# Patient Record
Sex: MALE | Race: WHITE | NOT HISPANIC OR LATINO | Employment: OTHER | URBAN - METROPOLITAN AREA
[De-identification: names, ages, dates, MRNs, and addresses within clinical notes are randomized per-mention and may not be internally consistent; named-entity substitution may affect disease eponyms.]

---

## 2017-01-24 ENCOUNTER — OFFICE VISIT (OUTPATIENT)
Dept: CARDIOLOGY | Facility: CLINIC | Age: 72
End: 2017-01-24

## 2017-01-24 VITALS
HEART RATE: 54 BPM | SYSTOLIC BLOOD PRESSURE: 144 MMHG | DIASTOLIC BLOOD PRESSURE: 78 MMHG | WEIGHT: 177 LBS | BODY MASS INDEX: 26.83 KG/M2 | HEIGHT: 68 IN

## 2017-01-24 DIAGNOSIS — E78.2 MIXED HYPERLIPIDEMIA: ICD-10-CM

## 2017-01-24 DIAGNOSIS — I48.0 PAROXYSMAL ATRIAL FIBRILLATION (HCC): Primary | ICD-10-CM

## 2017-01-24 DIAGNOSIS — I15.9 SECONDARY HYPERTENSION: ICD-10-CM

## 2017-01-24 PROBLEM — R07.9 CHEST PAIN: Status: ACTIVE | Noted: 2017-01-24

## 2017-01-24 PROCEDURE — 93000 ELECTROCARDIOGRAM COMPLETE: CPT | Performed by: NURSE PRACTITIONER

## 2017-01-24 PROCEDURE — 99213 OFFICE O/P EST LOW 20 MIN: CPT | Performed by: NURSE PRACTITIONER

## 2017-01-24 RX ORDER — ATORVASTATIN CALCIUM 10 MG/1
20 TABLET, FILM COATED ORAL DAILY
COMMUNITY
End: 2017-01-24 | Stop reason: SDUPTHER

## 2017-01-24 RX ORDER — ATORVASTATIN CALCIUM 10 MG/1
20 TABLET, FILM COATED ORAL DAILY
Qty: 180 TABLET | Refills: 3 | Status: SHIPPED | OUTPATIENT
Start: 2017-01-24 | End: 2018-01-24

## 2017-01-24 RX ORDER — FLECAINIDE ACETATE 150 MG/1
75 TABLET ORAL 2 TIMES DAILY
Qty: 90 TABLET | Refills: 3 | Status: SHIPPED | OUTPATIENT
Start: 2017-01-24 | End: 2018-01-24

## 2017-01-24 RX ORDER — FLECAINIDE ACETATE 150 MG/1
75 TABLET ORAL 2 TIMES DAILY
COMMUNITY
End: 2017-01-24 | Stop reason: SDUPTHER

## 2017-01-24 NOTE — PROGRESS NOTES
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    CC/Reason for visit:  Follow-up; Paroxysmal Atrial Fibrillation; and Hypertension          Jovon Gracia returns today for 12 month follow-up of his paroxysmal atrial fibrillation, hypertension and hyperlipidemia.  Since his last visit with us he has done well.  He has had no episodes of atrial fibrillation that he is aware of.  He denies any signs and symptoms of a TIA or stroke, specifically he denies slurred speech, one-sided weakness, blurred vision or facial drooping.  His only issue is he has been having difficulty with both of his wrists and was diagnosed with carpal tunnel.  He has an appointment with Dr Cate Slater for possible wrist surgery. He also saw a surgeon for his umbilical hernia but is going to hold off on surgery for it as it has been present and uncharged for over 40 years.  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  He denies any episodes of chest pain/heaviness/pressure, dyspnea, orthopnea, palpitations or syncope.  He underwent a stress test 2 years ago that was normal showing no evidence of ischemia.  His blood pressure is slightly elevated at today's visit and when looking back at his last visit it was slightly elevated then as well with systolics ranging in the 140s to 150s.  He admits he does not routinely check his blood pressure at home.  He routinely follows up with his primary care provider for routine bloodwork and was recently started on low-dose Lipitor of which he appears to be tolerating well and he denies any side effects such as myalgias.    Review of Systems   Constitution: Negative for weakness and malaise/fatigue.   Eyes: Negative for vision loss in left eye and vision loss in right eye.   Cardiovascular:  "Negative for chest pain, dyspnea on exertion, near-syncope, orthopnea, palpitations, paroxysmal nocturnal dyspnea and syncope.   Respiratory: Positive for snoring.    Musculoskeletal: Negative for myalgias.   Gastrointestinal: Positive for bloating.   Neurological: Negative for brief paralysis, excessive daytime sleepiness, focal weakness, numbness and paresthesias.   All other systems reviewed and are negative.      The patient's past medical, social, and family history reviewed in the patient's electronic medical record.    Allergies  Other    Outpatient Prescriptions Marked as Taking for the 1/24/17 encounter (Office Visit) with MIRIAM Zamora   Medication Sig Dispense Refill   • apixaban (ELIQUIS) 5 MG tablet tablet Take 1 tablet by mouth 2 (Two) Times a Day. 180 tablet 3   • atorvastatin (LIPITOR) 10 MG tablet Take 2 tablets by mouth Daily. 180 tablet 3   • flecainide (TAMBOCOR) 150 MG tablet Take 0.5 tablets by mouth 2 (Two) Times a Day. 90 tablet 3   • [DISCONTINUED] apixaban (ELIQUIS) 5 MG tablet tablet Take 5 mg by mouth 2 (Two) Times a Day.     • [DISCONTINUED] atorvastatin (LIPITOR) 10 MG tablet Take 20 mg by mouth Daily.     • [DISCONTINUED] flecainide (TAMBOCOR) 150 MG tablet Take 75 mg by mouth 2 (Two) Times a Day.           Blood pressure 144/78, pulse 54, height 68\" (172.7 cm), weight 177 lb (80.3 kg).  Body mass index is 26.91 kg/(m^2).    Physical Exam   Constitutional: He is oriented to person, place, and time. He appears well-developed and well-nourished.   HENT:   Head: Normocephalic and atraumatic.   Eyes: Pupils are equal, round, and reactive to light. No scleral icterus.   Neck: No JVD present. Carotid bruit is not present. No thyromegaly present.   Cardiovascular: Regular rhythm, S1 normal and S2 normal.  Bradycardia present.  Exam reveals no gallop.    No murmur heard.  Pulmonary/Chest: Effort normal and breath sounds normal.   Abdominal: Soft. There is no tenderness. "   Neurological: He is alert and oriented to person, place, and time.   Skin: Skin is warm and dry. No cyanosis. Nails show no clubbing.   Psychiatric: He has a normal mood and affect. His behavior is normal.       Data Review:     ECG 12 Lead  Date/Time: 1/24/2017 10:29 AM  Performed by: ANJALI MENDOZA  Authorized by: ANJALI MENDOZA   Rhythm: sinus bradycardia and A-V block  BPM: 54  Conduction: right bundle branch block and LAFB  Clinical impression: abnormal ECG  Comments: Sinus bradycardia with first-degree AV block  Incomplete right bundle branch block  Left anterior fascicular block  QRS 98 MS  QT/QTc 448/424 MS time    MS                 Problem List Items Addressed This Visit        Cardiovascular and Mediastinum    Paroxysmal atrial fibrillation - Primary    Overview     · CHADS-VASc score=2, on Eliquis and Flecanide  · Echocardiogram 10/21/2014: LVEF 55% to 60%, no valvular or wall motion abnormalities.  · Cardiolite stress test 11/6/2014: normal LV function and no evidence of ischemia with mild diaphragmatic attenuation.  · Event monitoring showing atrial fibrillation, symptomatic with palpitations and chest pain.            Current Assessment & Plan     · Continue Eliquis 5 mg twice a day  · Continue flecainide 150 mg twice a day         Relevant Orders    ECG 12 Lead    Secondary hypertension    Current Assessment & Plan     · Keep blood pressure log for next several weeks and contact us if SBP consistently >140 mmHg.  · Would recommend starting low-dose lisinopril 10 mg daily for SBP consistently >140 mmHg         Mixed hyperlipidemia    Current Assessment & Plan     · Continue Lipitor to 20 mg daily  · Follow-up primary care provider for routine lipid monitoring         Relevant Medications    atorvastatin (LIPITOR) 10 MG tablet        Mr. Gracia is doing well from a cardiovascular standpoint.  I am concerned his blood pressures may be running high.  We will hold off starting any additional  medications at this time and have him monitor his blood pressure over the next several weeks.  I instructed him to contact us if his SBP is consistently greater than 140 as we will need to add an additional antihypertensive medication to his regimen.  I would recommend adding low-dose lisinopril 10 mg daily.  If we do at this medication we will need to check a BMP in 2 weeks.       · Follow-up 12 months or sooner if needed.    Mariana PINEDA evaluated treated this patient independently    MIRIAM Taveras  1/24/2017

## 2017-01-24 NOTE — ASSESSMENT & PLAN NOTE
· Keep blood pressure log for next several weeks and contact us if SBP consistently >140 mmHg.  · Would recommend starting low-dose lisinopril 10 mg daily for SBP consistently >140 mmHg

## 2017-01-24 NOTE — MR AVS SNAPSHOT
Jovon Gracia   1/24/2017 10:30 AM   Office Visit    Dept Phone:  [phone]   Encounter #:  [id number]    Provider:  MIRIAM Zamora   Department:  [facility] [place] [facility]                Your Full Care Plan              Where to Get Your Medications      These medications were sent to [facility] [address] [place] [address] - [address] - [phone]  - [phone] FX  [address], [place] [id number]     Phone:  [phone]     apixaban 5 MG tablet tablet    atorvastatin 10 MG tablet    flecainide 150 MG tablet            Your Updated Medication List          This list is accurate as of: 1/24/17 10:53 AM.  Always use your most recent med list.                apixaban 5 MG tablet tablet   Commonly known as:  ELIQUIS   Take 1 tablet by mouth 2 (Two) Times a Day.       atorvastatin 10 MG tablet   Commonly known as:  LIPITOR   Take 2 tablets by mouth Daily.       flecainide 150 MG tablet   Commonly known as:  TAMBOCOR   Take 0.5 tablets by mouth 2 (Two) Times a Day.               We Performed the Following     ECG 12 Lead       You Were Diagnosed With        Codes Comments    Paroxysmal atrial fibrillation    -  Primary ICD-10-CM: I48.0  ICD-9-CM: 427.31     Secondary hypertension     ICD-10-CM: I15.9  ICD-9-CM: 405.99     Mixed hyperlipidemia     ICD-10-CM: E78.2  ICD-9-CM: 272.2       Instructions     None    Patient Instructions History      Upcoming Appointments     Visit Type Date Time Department    FOLLOW UP 1/24/2017 10:30 AM [facility]      Visiprise Signup     [facility] Visiprise allows you to send messages to your doctor, view your test results, renew your prescriptions, schedule appointments, and more. To sign up, go to [employer] and click on the Sign Up Now link in the New User? box. Enter your Visiprise Activation Code exactly as it appears below along with the last four digits of your Social Security  "Number and your Date of Birth () to complete the sign-up process. If you do not sign up before the expiration date, you must request a new code.    [employer] Activation Code: [email or web address]  Expires: 2017 10:51 AM    If you have questions, you can email [email or web address] or call [phone] to talk to our [employer] staff. Remember, [employer] is NOT to be used for urgent needs. For medical emergencies, dial 911.               Other Info from Your Visit           Your Appointments     2018 11:30 AM EST   Follow Up with Quinten Dial MD   [facility] (--)    [address] [place] [address] [address] [place] [id number] [phone]           Arrive 15 minutes prior to appointment.              Allergies     Other       BETA-BLOCKERS, (bradycardia).       Reason for Visit     Follow-up     Paroxysmal Atrial Fibrillation     Hypertension           Vital Signs     Blood Pressure Pulse Height Weight Body Mass Index Smoking Status    144/78 (BP Location: Right arm, Patient Position: Sitting) 54 68\" (172.7 cm) 177 lb (80.3 kg) 26.91 kg/m2 Former Smoker      Problems and Diagnoses Noted     Mixed hyperlipidemia    Atrial fibrillation (irregular heartbeat)    Secondary hypertension        "

## 2017-01-24 NOTE — LETTER
January 24, 2017     Cheri Monaco MD  2017 [address] [address]    Patient: Jovon Gracia   YOB: 1945   Date of Visit: 1/24/2017       Dear Dr. Jacinda MD:    Thank you for referring Jovon Gracia to me for evaluation. Below are the relevant portions of my assessment and plan of care.    If you have questions, please do not hesitate to call me. I look forward to following Jovon along with you.         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    CC/Reason for visit:  Follow-up; Paroxysmal Atrial Fibrillation; and Hypertension          Jovon Gracia returns today for 12 month follow-up of his paroxysmal atrial fibrillation, hypertension and hyperlipidemia.  Since his last visit with us he has done well.  He has had no episodes of atrial fibrillation that he is aware of.  He denies any signs and symptoms of a TIA or stroke, specifically he denies slurred speech, one-sided weakness, blurred vision or facial drooping.  His only issue is he has been having difficulty with both of his wrists and was diagnosed with carpal tunnel.  He has an appointment with Dr Cate Slater for possible wrist surgery. He also saw a surgeon for his umbilical hernia but is going to hold off on surgery for it as it has been present and uncharged for over 40 years.  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  He denies any episodes of chest pain/heaviness/pressure, dyspnea, orthopnea, palpitations or syncope.  He underwent a stress test 2 years ago that was normal showing no evidence of ischemia.  His blood pressure is slightly elevated at today's visit and when looking back at  "his last visit it was slightly elevated then as well with systolics ranging in the 140s to 150s.  He admits he does not routinely check his blood pressure at home.  He routinely follows up with his primary care provider for routine bloodwork and was recently started on low-dose Lipitor of which he appears to be tolerating well and he denies any side effects such as myalgias.    Review of Systems   Constitution: Negative for weakness and malaise/fatigue.   Eyes: Negative for vision loss in left eye and vision loss in right eye.   Cardiovascular: Negative for chest pain, dyspnea on exertion, near-syncope, orthopnea, palpitations, paroxysmal nocturnal dyspnea and syncope.   Respiratory: Positive for snoring.    Musculoskeletal: Negative for myalgias.   Gastrointestinal: Positive for bloating.   Neurological: Negative for brief paralysis, excessive daytime sleepiness, focal weakness, numbness and paresthesias.   All other systems reviewed and are negative.      The patient's past medical, social, and family history reviewed in the patient's electronic medical record.    Allergies  Other    Outpatient Prescriptions Marked as Taking for the 1/24/17 encounter (Office Visit) with MIRIAM Zamora   Medication Sig Dispense Refill   • apixaban (ELIQUIS) 5 MG tablet tablet Take 1 tablet by mouth 2 (Two) Times a Day. 180 tablet 3   • atorvastatin (LIPITOR) 10 MG tablet Take 2 tablets by mouth Daily. 180 tablet 3   • flecainide (TAMBOCOR) 150 MG tablet Take 0.5 tablets by mouth 2 (Two) Times a Day. 90 tablet 3   • [DISCONTINUED] apixaban (ELIQUIS) 5 MG tablet tablet Take 5 mg by mouth 2 (Two) Times a Day.     • [DISCONTINUED] atorvastatin (LIPITOR) 10 MG tablet Take 20 mg by mouth Daily.     • [DISCONTINUED] flecainide (TAMBOCOR) 150 MG tablet Take 75 mg by mouth 2 (Two) Times a Day.           Blood pressure 144/78, pulse 54, height 68\" (172.7 cm), weight 177 lb (80.3 kg).  Body mass index is 26.91 kg/(m^2).    Physical " Exam   Constitutional: He is oriented to person, place, and time. He appears well-developed and well-nourished.   HENT:   Head: Normocephalic and atraumatic.   Eyes: Pupils are equal, round, and reactive to light. No scleral icterus.   Neck: No JVD present. Carotid bruit is not present. No thyromegaly present.   Cardiovascular: Regular rhythm, S1 normal and S2 normal.  Bradycardia present.  Exam reveals no gallop.    No murmur heard.  Pulmonary/Chest: Effort normal and breath sounds normal.   Abdominal: Soft. There is no tenderness.   Neurological: He is alert and oriented to person, place, and time.   Skin: Skin is warm and dry. No cyanosis. Nails show no clubbing.   Psychiatric: He has a normal mood and affect. His behavior is normal.       Data Review:     ECG 12 Lead  Date/Time: 1/24/2017 10:29 AM  Performed by: ANJALI MENDOZA  Authorized by: ANJALI MENDOZA   Rhythm: sinus bradycardia and A-V block  BPM: 54  Conduction: right bundle branch block and LAFB  Clinical impression: abnormal ECG  Comments: Sinus bradycardia with first-degree AV block  Incomplete right bundle branch block  Left anterior fascicular block  QRS 98 MS  QT/QTc 448/424 MS time    MS                 Problem List Items Addressed This Visit        Cardiovascular and Mediastinum    Paroxysmal atrial fibrillation - Primary    Overview     · CHADS-VASc score=2, on Eliquis and Flecanide  · Echocardiogram 10/21/2014: LVEF 55% to 60%, no valvular or wall motion abnormalities.  · Cardiolite stress test 11/6/2014: normal LV function and no evidence of ischemia with mild diaphragmatic attenuation.  · Event monitoring showing atrial fibrillation, symptomatic with palpitations and chest pain.            Current Assessment & Plan     · Continue Eliquis 5 mg twice a day  · Continue flecainide 150 mg twice a day         Relevant Orders    ECG 12 Lead    Secondary hypertension    Current Assessment & Plan     · Keep blood pressure log for next  several weeks and contact us if SBP consistently >140 mmHg.  · Would recommend starting low-dose lisinopril 10 mg daily for SBP consistently >140 mmHg         Mixed hyperlipidemia    Current Assessment & Plan     · Continue Lipitor to 20 mg daily  · Follow-up primary care provider for routine lipid monitoring         Relevant Medications    atorvastatin (LIPITOR) 10 MG tablet        Mr. Gracia is doing well from a cardiovascular standpoint.  I am concerned his blood pressures may be running high.  We will hold off starting any additional medications at this time and have him monitor his blood pressure over the next several weeks.  I instructed him to contact us if his SBP is consistently greater than 140 as we will need to add an additional antihypertensive medication to his regimen.  I would recommend adding low-dose lisinopril 10 mg daily.  If we do at this medication we will need to check a BMP in 2 weeks.       · Follow-up 12 months or sooner if needed.    Mariana PINEDA evaluated treated this patient independently    MIRIAM Taveras  1/24/2017

## 2017-02-01 ENCOUNTER — OTHER (OUTPATIENT)
Age: 72
End: 2017-02-01

## 2017-08-01 ENCOUNTER — TELEPHONE (OUTPATIENT)
Dept: CARDIOLOGY | Facility: CLINIC | Age: 72
End: 2017-08-01

## 2017-08-01 DIAGNOSIS — I15.8 OTHER SECONDARY HYPERTENSION: Primary | ICD-10-CM

## 2017-08-01 RX ORDER — LISINOPRIL 10 MG/1
10 TABLET ORAL DAILY
Qty: 90 TABLET | Refills: 0 | Status: SHIPPED | OUTPATIENT
Start: 2017-08-01 | End: 2017-11-01 | Stop reason: SDUPTHER

## 2017-08-01 NOTE — TELEPHONE ENCOUNTER
"Patient walked in today to report his BP running, \"high\" 's. He is unsure exactly of the readings. Per MIRIAM Briseno last note, will start lisinopril and get BMP in 2 weeks. Patient verbalized understanding.   "

## 2017-08-15 ENCOUNTER — LAB (OUTPATIENT)
Dept: LAB | Facility: HOSPITAL | Age: 72
End: 2017-08-15

## 2017-08-15 DIAGNOSIS — I15.8 OTHER SECONDARY HYPERTENSION: ICD-10-CM

## 2017-08-16 LAB
BUN SERPL-MCNC: 19 MG/DL (ref 9–23)
BUN/CREAT SERPL: 21.1 (ref 7–25)
CALCIUM SERPL-MCNC: 10.1 MG/DL (ref 8.7–10.4)
CHLORIDE SERPL-SCNC: 107 MMOL/L (ref 99–109)
CO2 SERPL-SCNC: 29 MMOL/L (ref 20–31)
CREAT SERPL-MCNC: 0.9 MG/DL (ref 0.6–1.3)
GLUCOSE SERPL-MCNC: 92 MG/DL (ref 70–100)
POTASSIUM SERPL-SCNC: 4.6 MMOL/L (ref 3.5–5.5)
SODIUM SERPL-SCNC: 144 MMOL/L (ref 132–146)

## 2017-11-01 RX ORDER — LISINOPRIL 10 MG/1
10 TABLET ORAL DAILY
Qty: 90 TABLET | Refills: 1 | Status: SHIPPED | OUTPATIENT
Start: 2017-11-01 | End: 2018-04-23 | Stop reason: SDUPTHER

## 2017-11-12 ENCOUNTER — APPOINTMENT (OUTPATIENT)
Dept: MRI IMAGING | Facility: IMAGING CENTER | Age: 72
End: 2017-11-12
Payer: MEDICARE

## 2017-11-12 ENCOUNTER — OUTPATIENT (OUTPATIENT)
Dept: OUTPATIENT SERVICES | Facility: HOSPITAL | Age: 72
LOS: 1 days | End: 2017-11-12
Payer: MEDICARE

## 2017-11-12 DIAGNOSIS — Z00.8 ENCOUNTER FOR OTHER GENERAL EXAMINATION: ICD-10-CM

## 2017-11-12 PROCEDURE — 82565 ASSAY OF CREATININE: CPT

## 2017-11-12 PROCEDURE — 74183 MRI ABD W/O CNTR FLWD CNTR: CPT | Mod: 26

## 2017-11-12 PROCEDURE — A9585: CPT

## 2017-11-12 PROCEDURE — 74183 MRI ABD W/O CNTR FLWD CNTR: CPT

## 2018-01-30 ENCOUNTER — OFFICE VISIT (OUTPATIENT)
Dept: CARDIOLOGY | Facility: CLINIC | Age: 73
End: 2018-01-30

## 2018-01-30 VITALS
HEIGHT: 68 IN | HEART RATE: 53 BPM | OXYGEN SATURATION: 98 % | DIASTOLIC BLOOD PRESSURE: 80 MMHG | SYSTOLIC BLOOD PRESSURE: 140 MMHG | BODY MASS INDEX: 25.58 KG/M2 | WEIGHT: 168.8 LBS

## 2018-01-30 DIAGNOSIS — I10 ESSENTIAL HYPERTENSION: ICD-10-CM

## 2018-01-30 DIAGNOSIS — I48.0 PAROXYSMAL ATRIAL FIBRILLATION (HCC): Primary | ICD-10-CM

## 2018-01-30 DIAGNOSIS — E78.5 HYPERLIPIDEMIA LDL GOAL <100: ICD-10-CM

## 2018-01-30 PROBLEM — R07.89 ATYPICAL CHEST PAIN: Status: ACTIVE | Noted: 2017-01-24

## 2018-01-30 PROCEDURE — 99214 OFFICE O/P EST MOD 30 MIN: CPT | Performed by: INTERNAL MEDICINE

## 2018-01-30 PROCEDURE — 93000 ELECTROCARDIOGRAM COMPLETE: CPT | Performed by: INTERNAL MEDICINE

## 2018-01-30 RX ORDER — FLECAINIDE ACETATE 150 MG/1
75 TABLET ORAL EVERY 12 HOURS SCHEDULED
Qty: 90 TABLET | Refills: 3 | Status: SHIPPED | OUTPATIENT
Start: 2018-01-30 | End: 2019-01-25

## 2018-01-30 RX ORDER — ERGOCALCIFEROL (VITAMIN D2) 10 MCG
400 TABLET ORAL DAILY
COMMUNITY
End: 2018-08-21

## 2018-01-30 RX ORDER — ATORVASTATIN CALCIUM 20 MG/1
20 TABLET, FILM COATED ORAL NIGHTLY
COMMUNITY
Start: 2017-12-14 | End: 2019-03-05 | Stop reason: SDUPTHER

## 2018-01-30 RX ORDER — FLECAINIDE ACETATE 150 MG/1
150 TABLET ORAL 2 TIMES DAILY
COMMUNITY
End: 2018-01-30 | Stop reason: SDUPTHER

## 2018-01-30 NOTE — ASSESSMENT & PLAN NOTE
· Minimally symptomatic  · Continue flecainide for rhythm control and Eliquis for stroke prophylaxis

## 2018-01-30 NOTE — PROGRESS NOTES
Encounter Date:01/30/2018    Patient ID: Jovon Gracia is a  72 y.o. male who resides in [place], [place].    CC/Reason for visit:  Atrial Fibrillation            Jovon Gracia returns to my office today in follow up of his paroxysmal atrial fibrillation, hypertension, and hyperlipidemia. Since last visit, patient has been feeling well overall from a cardiovascular standpoint. He states that he is not had any of the episodic weakness that he felt with previous episodes of atrial fibrillation. He did note that after a very strenuous job outside he noted irregularity of his pulse rate on his smart phone. This lasted for 45 minutes and resolve spontaneously. Otherwise, the patient said no cardiac issues.    Review of Systems   Constitution: Negative for weakness and malaise/fatigue.   HENT: Positive for hearing loss.    Eyes: Negative for vision loss in left eye and vision loss in right eye.   Cardiovascular: Positive for irregular heartbeat. Negative for chest pain, dyspnea on exertion, near-syncope, orthopnea, palpitations, paroxysmal nocturnal dyspnea and syncope.   Respiratory: Positive for snoring.    Musculoskeletal: Negative for myalgias.   Neurological: Negative for brief paralysis, excessive daytime sleepiness, focal weakness, numbness and paresthesias.   Allergic/Immunologic: Positive for environmental allergies.   All other systems reviewed and are negative.      The patient's past medical, social, family history and ROS reviewed in the patient's electronic medical record.    Allergies  Other    Outpatient Prescriptions Marked as Taking for the 1/30/18 encounter (Office Visit) with Quinten Dial IV, MD   Medication Sig Dispense Refill   • apixaban (ELIQUIS) 5 MG tablet tablet Take 1 tablet by mouth Every 12 (Twelve) Hours. 180 tablet 3   • atorvastatin (LIPITOR) 20 MG tablet Daily.     • flecainide (TAMBOCOR) 150 MG tablet Take 0.5 tablets by mouth Every 12 (Twelve) Hours for 360 days. 90 tablet 3   •  "lisinopril (PRINIVIL,ZESTRIL) 10 MG tablet Take 1 tablet by mouth Daily. 90 tablet 1   • Multiple Vitamins-Minerals (MULTIVITAMIN ADULTS 50+ PO) Take  by mouth Daily.     • Vitamin D, Cholecalciferol, (CHOLECALCIFEROL) 400 units tablet Take 400 Units by mouth Daily.     • [DISCONTINUED] apixaban (ELIQUIS) 5 MG tablet tablet Take 1 tablet by mouth Every 12 (Twelve) Hours. 180 tablet 3   • [DISCONTINUED] flecainide (TAMBOCOR) 150 MG tablet Take 150 mg by mouth 2 (Two) Times a Day. 0.5 tablet BID           Blood pressure 140/80, pulse 53, height 172.7 cm (68\"), weight 76.6 kg (168 lb 12.8 oz), SpO2 98 %.  Body mass index is 25.67 kg/(m^2).    Physical Exam   Constitutional: He is oriented to person, place, and time. He appears well-developed and well-nourished.   HENT:   Head: Normocephalic and atraumatic.   Eyes: Pupils are equal, round, and reactive to light. No scleral icterus.   Neck: No JVD present. Carotid bruit is not present. No thyromegaly present.   Cardiovascular: Normal rate, regular rhythm, S1 normal and S2 normal.  Exam reveals no gallop.    No murmur heard.  Pulmonary/Chest: Effort normal and breath sounds normal.   Abdominal: Soft. There is no hepatosplenomegaly. There is no tenderness.   Neurological: He is alert and oriented to person, place, and time.   Skin: Skin is warm and dry. No cyanosis. Nails show no clubbing.   Psychiatric: He has a normal mood and affect. His behavior is normal.       Data Review:     ECG 12 Lead  Date/Time: 1/30/2018 12:01 PM  Performed by: NISHI ZAYAS IV  Authorized by: NISHI ZAYAS IV   Comparison: compared with previous ECG from 10/16/2015  Similar to previous ECG  Rhythm: sinus bradycardia  BPM: 53  Conduction: LAFB  Other findings: PRWP  Clinical impression: abnormal ECG               Problem List Items Addressed This Visit        Cardiovascular and Mediastinum    Paroxysmal atrial fibrillation - Primary    Overview     · CHADS-VASc =2 (age, " HTN)  · Echocardiogram (10/21/2014): LVEF 55% to 60%, no valvular or wall motion abnormalities.  · Cardiolite stress test (11/6/2014): normal LV function and no evidence of ischemia with mild diaphragmatic attenuation.  · Event monitoring showing atrial fibrillation, symptomatic with palpitations and chest pain.          Current Assessment & Plan     · Minimally symptomatic  · Continue flecainide for rhythm control and Eliquis for stroke prophylaxis         Relevant Medications    flecainide (TAMBOCOR) 150 MG tablet    apixaban (ELIQUIS) 5 MG tablet tablet    Other Relevant Orders    ECG 12 Lead    Essential hypertension    Overview     · Elevated at today's visit but reported normal blood pressures at home  · No changes to medical therapy today         Hyperlipidemia LDL goal <100    Overview     · Moderate intensity statin therapy indicated for primary prevention         Current Assessment & Plan     · Continue atorvastatin for primary prevention         Relevant Medications    atorvastatin (LIPITOR) 20 MG tablet               · Continue present medical therapy  · Return to clinic in 6 months    Quinten Dial IV, MD  1/30/2018     Scribed for Quinten Dial IV, MD by Stefan Andrews. 1/30/2018  12:40 PM

## 2018-04-23 RX ORDER — LISINOPRIL 10 MG/1
TABLET ORAL
Qty: 90 TABLET | Refills: 1 | Status: SHIPPED | OUTPATIENT
Start: 2018-04-23 | End: 2018-10-22 | Stop reason: SDUPTHER

## 2018-04-30 ENCOUNTER — APPOINTMENT (OUTPATIENT)
Dept: PULMONOLOGY | Facility: CLINIC | Age: 73
End: 2018-04-30
Payer: MEDICARE

## 2018-04-30 VITALS
SYSTOLIC BLOOD PRESSURE: 110 MMHG | WEIGHT: 250 LBS | BODY MASS INDEX: 35 KG/M2 | RESPIRATION RATE: 18 BRPM | HEIGHT: 71 IN | TEMPERATURE: 99.1 F | DIASTOLIC BLOOD PRESSURE: 70 MMHG | HEART RATE: 112 BPM

## 2018-04-30 PROCEDURE — 99215 OFFICE O/P EST HI 40 MIN: CPT

## 2018-04-30 RX ORDER — MONTELUKAST 10 MG/1
10 TABLET, FILM COATED ORAL
Refills: 0 | Status: ACTIVE | COMMUNITY

## 2018-08-21 ENCOUNTER — OFFICE VISIT (OUTPATIENT)
Dept: CARDIOLOGY | Facility: CLINIC | Age: 73
End: 2018-08-21

## 2018-08-21 ENCOUNTER — LAB REQUISITION (OUTPATIENT)
Dept: LAB | Facility: HOSPITAL | Age: 73
End: 2018-08-21

## 2018-08-21 ENCOUNTER — HOSPITAL ENCOUNTER (OUTPATIENT)
Dept: GENERAL RADIOLOGY | Facility: HOSPITAL | Age: 73
Discharge: HOME | End: 2018-08-21
Admitting: NURSE PRACTITIONER

## 2018-08-21 VITALS
SYSTOLIC BLOOD PRESSURE: 132 MMHG | BODY MASS INDEX: 24.55 KG/M2 | DIASTOLIC BLOOD PRESSURE: 70 MMHG | HEIGHT: 68 IN | HEART RATE: 50 BPM | WEIGHT: 162 LBS

## 2018-08-21 DIAGNOSIS — I48.0 PAROXYSMAL ATRIAL FIBRILLATION (HCC): Primary | ICD-10-CM

## 2018-08-21 DIAGNOSIS — I10 ESSENTIAL HYPERTENSION: ICD-10-CM

## 2018-08-21 DIAGNOSIS — R05.8 SPUTUM PRODUCTION: ICD-10-CM

## 2018-08-21 DIAGNOSIS — Z00.00 ROUTINE GENERAL MEDICAL EXAMINATION AT A HEALTH CARE FACILITY: ICD-10-CM

## 2018-08-21 DIAGNOSIS — E78.5 HYPERLIPIDEMIA LDL GOAL <100: ICD-10-CM

## 2018-08-21 LAB
ALBUMIN SERPL-MCNC: 4.37 G/DL (ref 3.2–4.8)
ALBUMIN/GLOB SERPL: 2.1 G/DL (ref 1.5–2.5)
ALP SERPL-CCNC: 87 U/L (ref 25–100)
ALT SERPL W P-5'-P-CCNC: 19 U/L (ref 7–40)
ANION GAP SERPL CALCULATED.3IONS-SCNC: 7 MMOL/L (ref 3–11)
ARTICHOKE IGE QN: 68 MG/DL (ref 0–130)
AST SERPL-CCNC: 24 U/L (ref 0–33)
BILIRUB SERPL-MCNC: 0.9 MG/DL (ref 0.3–1.2)
BNP SERPL-MCNC: 82 PG/ML (ref 0–100)
BUN BLD-MCNC: 17 MG/DL (ref 9–23)
BUN/CREAT SERPL: 18.7 (ref 7–25)
CALCIUM SPEC-SCNC: 9 MG/DL (ref 8.7–10.4)
CHLORIDE SERPL-SCNC: 105 MMOL/L (ref 99–109)
CHOLEST SERPL-MCNC: 120 MG/DL (ref 0–200)
CO2 SERPL-SCNC: 27 MMOL/L (ref 20–31)
CREAT BLD-MCNC: 0.91 MG/DL (ref 0.6–1.3)
GFR SERPL CREATININE-BSD FRML MDRD: 82 ML/MIN/1.73
GLOBULIN UR ELPH-MCNC: 2.1 GM/DL
GLUCOSE BLD-MCNC: 90 MG/DL (ref 70–100)
HDLC SERPL-MCNC: 44 MG/DL (ref 40–60)
POTASSIUM BLD-SCNC: 4.3 MMOL/L (ref 3.5–5.5)
PROT SERPL-MCNC: 6.5 G/DL (ref 5.7–8.2)
SODIUM BLD-SCNC: 139 MMOL/L (ref 132–146)
TRIGL SERPL-MCNC: 33 MG/DL (ref 0–150)

## 2018-08-21 PROCEDURE — 83880 ASSAY OF NATRIURETIC PEPTIDE: CPT | Performed by: NURSE PRACTITIONER

## 2018-08-21 PROCEDURE — 93000 ELECTROCARDIOGRAM COMPLETE: CPT | Performed by: NURSE PRACTITIONER

## 2018-08-21 PROCEDURE — 80061 LIPID PANEL: CPT | Performed by: NURSE PRACTITIONER

## 2018-08-21 PROCEDURE — 80053 COMPREHEN METABOLIC PANEL: CPT | Performed by: NURSE PRACTITIONER

## 2018-08-21 PROCEDURE — 36415 COLL VENOUS BLD VENIPUNCTURE: CPT | Performed by: NURSE PRACTITIONER

## 2018-08-21 PROCEDURE — 71046 X-RAY EXAM CHEST 2 VIEWS: CPT

## 2018-08-21 PROCEDURE — 99214 OFFICE O/P EST MOD 30 MIN: CPT | Performed by: NURSE PRACTITIONER

## 2018-08-21 NOTE — PROGRESS NOTES
Encounter Date:08/21/2018    Patient ID: Jovon Gracia is a 72 y.o. male who is  and resides in [place], [place].  He is a day rik horticulturist.  CC/Reason for visit:  Paroxysmal atrial fibrillation (CMS/HCC)            Jovon Gracia returns today for follow-up of his paroxysmal atrial fibrillation and cardiac risk factors.  Since his last visit he has not went out of rhythm that he is aware of.  He continues to take flecainide twice daily and is anticoagulated with Eliquis.  He reports his blood pressures have stayed well controlled on daily lisinopril therapy.  The patient reports an increase in possible sputum production.  Over the past month he feels that\ something is in his throat causing him to cough or throw it up. He is unsure whether it's coming from his stomach or his lungs.  He describes it as clear to frothy sputum.  He denies chest pain or shortness of breath however.  He does report a history of acid reflux for which he is not on medication.  He also reports a history of seasonal allergies particularly to ragweed. He has never had a GI workup or underwent an EGD.    Review of Systems   Respiratory: Positive for sputum production.    Gastrointestinal: Positive for bloating.   Allergic/Immunologic: Positive for environmental allergies.       The patient's past medical, social, family history and ROS reviewed in the patient's electronic medical record.    Allergies  Other    Outpatient Prescriptions Marked as Taking for the 8/21/18 encounter (Office Visit) with Mariana Germain APRN   Medication Sig Dispense Refill   • apixaban (ELIQUIS) 5 MG tablet tablet Take 1 tablet by mouth Every 12 (Twelve) Hours. 180 tablet 3   • atorvastatin (LIPITOR) 20 MG tablet Take 20 mg by mouth Every Night.     • flecainide (TAMBOCOR) 150 MG tablet Take 0.5 tablets by mouth Every 12 (Twelve) Hours for 360 days. 90 tablet 3   • lisinopril (PRINIVIL,ZESTRIL) 10 MG tablet TAKE ONE TABLET BY MOUTH ONCE DAILY 90 tablet  "1         Blood pressure 132/70, pulse 50, height 172.7 cm (68\"), weight 73.5 kg (162 lb).  Body mass index is 24.63 kg/m².  There were no vitals filed for this visit.    Physical Exam   Constitutional: He is oriented to person, place, and time. He appears well-developed and well-nourished.   HENT:   Head: Normocephalic and atraumatic.   Eyes: Pupils are equal, round, and reactive to light. No scleral icterus.   Neck: No JVD present. Carotid bruit is not present. No thyromegaly present.   Cardiovascular: Normal rate, regular rhythm, S1 normal and S2 normal.  Exam reveals no gallop.    No murmur heard.  Pulmonary/Chest: Effort normal and breath sounds normal.   Abdominal: Soft. There is no hepatosplenomegaly. There is no tenderness.   Neurological: He is alert and oriented to person, place, and time.   Skin: Skin is warm and dry. No cyanosis. Nails show no clubbing.   Psychiatric: He has a normal mood and affect. His behavior is normal.       Data Review:       ECG 12 Lead  Date/Time: 8/21/2018 11:59 AM  Performed by: ANJALI MENDOZA  Authorized by: ANJALI MENDOZA   Comparison: compared with previous ECG from 1/30/2018  Similar to previous ECG  Rhythm: sinus bradycardia  BPM: 50  Clinical impression: abnormal ECG  Comments: Sinus bradycardia with first-degree AV block  TN interval 212 MS  QRS duration 96 MS  QT//419 MS            No results found for: CHOL, TRIG, HDL, LDL, AST, ALT    No results found for: HGBA1C         Problem List Items Addressed This Visit        Cardiovascular and Mediastinum    Paroxysmal atrial fibrillation (CMS/HCC) - Primary    Overview     · CHADS-VASc =2 (age, HTN)  · Echocardiogram (10/21/2014): LVEF 55% to 60%, no valvular or wall motion abnormalities.  · Cardiolite stress test (11/6/2014): normal LV function and no evidence of ischemia with mild diaphragmatic attenuation.  · Event monitoring showing atrial fibrillation, symptomatic with palpitations and chest pain.       "    Current Assessment & Plan     · Continue flecainide 75 mg twice a day  · Continue Eliquis 5 mg twice a day         Relevant Orders    BNP    Essential hypertension    Overview     · Elevated at today's visit but reported normal blood pressures at home  · No changes to medical therapy today         Current Assessment & Plan     · Hypertension is controlled  · Continue lisinopril 10 mg daily         Hyperlipidemia LDL goal <100    Overview     · Moderate intensity statin therapy indicated for primary prevention         Current Assessment & Plan     · Continue Lipitor 20 mg daily  · Obtain CMP and lipid profile the day         Relevant Orders    Lipid Panel    Comprehensive Metabolic Panel       Respiratory    Sputum production    Current Assessment & Plan     · Obtain chest x-ray  · Obtain BNP         Relevant Orders    XR Chest PA & Lateral        The patient's symptoms are offered.  He has been on lisinopril for over a year and had no coughing symptoms.  He is unsure whether this is sputum from his line or throwing something up from his stomach.  I will obtain a BNP level and chest x-ray today.  I've instructed him to follow-up with his primary care provider because he would likely benefit from a GI referral for an EGD.  He is maintaining normal sinus rhythm although he is bradycardic today in the 50s that appears to be his baseline.  He is asymptomatic with it.  We will continue his current medications and schedule follow-up 6 months or sooner if needed.  I will contact him by phone for any abnormal test results are back further recommendations.       · Obtain BNP, CMP and lipid profile today  · Obtain chest x-ray today  · Follow-up PCP for likely GI referral  · Continue current medications  · Follow-up 6 months or sooner if needed    Lalitha Germain, MIRIAM  8/21/2018

## 2018-08-23 LAB
ALBUMIN SERPL-MCNC: 4.37 G/DL (ref 3.2–4.8)
ALBUMIN/GLOB SERPL: 2.1 G/DL (ref 1.5–2.5)
ALP SERPL-CCNC: 87 U/L (ref 25–100)
ALT SERPL-CCNC: 19 U/L (ref 7–40)
AST SERPL-CCNC: 24 U/L (ref 0–33)
BILIRUB SERPL-MCNC: 0.9 MG/DL (ref 0.3–1.2)
BNP SERPL-MCNC: 82 PG/ML (ref 0–100)
BUN SERPL-MCNC: 17 MG/DL (ref 9–23)
BUN/CREAT SERPL: 18.7 (ref 7–25)
CALCIUM SERPL-MCNC: 9 MG/DL (ref 8.7–10.4)
CHLORIDE SERPL-SCNC: 105 MMOL/L (ref 99–109)
CHOLEST SERPL-MCNC: 120 MG/DL (ref 0–200)
CO2 SERPL-SCNC: 27 MMOL/L (ref 20–31)
CREAT SERPL-MCNC: 0.91 MG/DL (ref 0.6–1.3)
GLOBULIN SER CALC-MCNC: 2.1 G/DL
GLUCOSE SERPL-MCNC: 90 MG/DL (ref 70–100)
HDLC SERPL-MCNC: 44 MG/DL (ref 40–60)
LDLC SERPL CALC-MCNC: 68 MG/DL (ref 0–99)
POTASSIUM SERPL-SCNC: 4.3 MMOL/L (ref 3.5–5.5)
PROT SERPL-MCNC: 6.5 G/DL (ref 5.7–8.2)
SODIUM SERPL-SCNC: 139 MMOL/L (ref 132–146)
TRIGL SERPL-MCNC: 33 MG/DL (ref 0–150)
VLDLC SERPL CALC-MCNC: 7 MG/DL

## 2018-10-22 RX ORDER — LISINOPRIL 10 MG/1
TABLET ORAL
Qty: 90 TABLET | Refills: 1 | Status: SHIPPED | OUTPATIENT
Start: 2018-10-22 | End: 2019-03-05 | Stop reason: SDUPTHER

## 2019-03-04 NOTE — PROGRESS NOTES
[facility]  Outpatient Follow-up Visit    Jovon Gracia  1945  PCP: Cheri Monaco MD      ID:  Jovon Gracia is a 73 y.o. male  Who is  and resides in [place], [place]. 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     Chief Complaint   Patient presents with   • Paroxysmal atrial fibrillation            The patient returns today for his 6 month follow up of his paroxysmal atrial fibrillation, hypertension, hyperlipidemia and other cardiac risk factors. At his last visit, he complained of increased sputum production and coughing. A chest xray was performed, which was unremarkable. He was advised to follow up with his PCP for further workup.             Allergies   Allergen Reactions   • Other       BETA-BLOCKERS, (bradycardia).          Current Outpatient Medications:   •  apixaban (ELIQUIS) 5 MG tablet tablet, Take 1 tablet by mouth Every 12 (Twelve) Hours., Disp: 180 tablet, Rfl: 1  •  atorvastatin (LIPITOR) 20 MG tablet, Take 1 tablet by mouth Every Night., Disp: 90 tablet, Rfl: 1  •  flecainide (TAMBOCOR) 150 MG tablet, Take 1 tablet by mouth 2 (Two) Times a Day. 1/2 daily, Disp: 180 tablet, Rfl: 1  •  lisinopril (PRINIVIL,ZESTRIL) 10 MG tablet, Take 1 tablet by mouth Daily., Disp: 90 tablet, Rfl: 1    Past Medical History, Past Surgical History, Family history, Social History, and Medications were all reviewed with the patient today and updated as necessary.     Past Medical History:   Diagnosis Date   • Acid reflux 12/22/2016   • Carpal tunnel syndrome    • Hypertension 12/22/2016   • PAF (paroxysmal atrial fibrillation) (CMS/[facility]) 12/22/2016   • Seasonal allergies 12/22/2016   • Umbilical hernia      Past Surgical History:   Procedure Laterality Date   • HERNIA REPAIR      x2   • TONSILLECTOMY       Family History   Problem Relation Age of Onset   • Alzheimer's disease Mother    • Diabetes Father    • Heart failure Father      Social History     Tobacco Use   • Smoking  "status: Former Smoker     Years: 16.00     Types: Cigarettes     Last attempt to quit:      Years since quittin.2   • Smokeless tobacco: Never Used   Substance Use Topics   • Alcohol use: No       Review of Systems   Constitution: Negative for weakness and malaise/fatigue.   Eyes: Negative for vision loss in left eye and vision loss in right eye.   Cardiovascular: Negative for chest pain, dyspnea on exertion, near-syncope, orthopnea, palpitations, paroxysmal nocturnal dyspnea and syncope.   Musculoskeletal: Negative for myalgias.   Neurological: Negative for brief paralysis, excessive daytime sleepiness, focal weakness, numbness and paresthesias.   All other systems reviewed and are negative.              /70 (BP Location: Right arm, Patient Position: Sitting)   Pulse 55   Ht 172.7 cm (68\")   Wt 77.2 kg (170 lb 3.2 oz)   SpO2 98%   BMI 25.88 kg/m²        Wt Readings from Last 5 Encounters:   19 77.2 kg (170 lb 3.2 oz)   18 73.5 kg (162 lb)   18 76.6 kg (168 lb 12.8 oz)   17 80.3 kg (177 lb)   10/16/15 79.1 kg (174 lb 6.1 oz)       BP Readings from Last 5 Encounters:   19 138/70   18 132/70   18 140/80   17 144/78   10/16/15 145/73       Physical Exam   Constitutional: He is oriented to person, place, and time. He appears well-developed and well-nourished.   HENT:   Head: Normocephalic and atraumatic.   Eyes: Pupils are equal, round, and reactive to light. No scleral icterus.   Neck: No JVD present. Carotid bruit is not present. No thyromegaly present.   Cardiovascular: Normal rate, regular rhythm, S1 normal and S2 normal. Exam reveals no gallop.   No murmur heard.  Pulmonary/Chest: Effort normal and breath sounds normal.   Abdominal: Soft. There is no hepatosplenomegaly. There is no tenderness.   Neurological: He is alert and oriented to person, place, and time.   Skin: Skin is warm and dry. No cyanosis. Nails show no clubbing.   Psychiatric: He has " a normal mood and affect. His behavior is normal.       EKG: (EKG has been independently visualized by me and summarized below)      ECG 12 Lead  Date/Time: 3/6/2019 11:26 AM  Performed by: Quinten Dial IV, MD  Authorized by: Quinten Dial IV, MD   Rhythm: sinus bradycardia  BPM: 54  Other findings: poor R wave progression  Comments: QTc interval 424  Artifact               Lab Results   Component Value Date    GLUCOSE 90 08/21/2018    BUN 17 08/21/2018    CREATININE 0.91 08/21/2018    EGFRIFNONA 82 08/21/2018    EGFRIFAFRI 101 08/15/2017    BCR 18.7 08/21/2018    K 4.3 08/21/2018    CO2 27.0 08/21/2018    CALCIUM 9.0 08/21/2018    PROTENTOTREF 6.5 08/21/2018    ALBUMIN 4.37 08/21/2018    LABIL2 2.1 08/21/2018    AST 24 08/21/2018    ALT 19 08/21/2018     Lab Results   Component Value Date    CHOL 120 08/21/2018    CHLPL 120 08/21/2018    TRIG 33 08/21/2018    HDL 44 08/21/2018    LDL 68 08/21/2018     Labs (8/21/2018):  · BNP 82.0       Problem List Items Addressed This Visit        Cardiology Problems    Paroxysmal atrial fibrillation (CMS/HCC) - Primary    Overview     · CHADS-VASc =2 (age, HTN)  · Echocardiogram (10/21/2014): LVEF 55% to 60%, no valvular or wall motion abnormalities.  · Cardiolite stress test (11/6/2014): normal LV function and no evidence of ischemia with mild diaphragmatic attenuation.  · Event monitoring showing atrial fibrillation, symptomatic with palpitations and chest pain.          Current Assessment & Plan     · No symptomatic atrial fibrillation with flecainide  · Acceptable EKG today  · Continue flecainide and Eliquis  · No AV vu blocking agent due to bradycardia         Relevant Medications    flecainide (TAMBOCOR) 150 MG tablet    apixaban (ELIQUIS) 5 MG tablet tablet    Essential hypertension    Current Assessment & Plan     · Controlled  · Continue present medical therapy.         Relevant Medications    lisinopril (PRINIVIL,ZESTRIL) 10 MG tablet     Hyperlipidemia LDL goal <100    Overview     · Moderate intensity statin therapy indicated for primary prevention         Current Assessment & Plan     · Continue Lipitor 20 mg daily         Relevant Medications    atorvastatin (LIPITOR) 20 MG tablet               · Continue present medical therapy  Return in about 6 months (around 9/5/2019).          JUAN Dial M.D., [facility], [facility]  Interventional Cardiology  3/6/2019  9:39 AM

## 2019-03-05 ENCOUNTER — OFFICE VISIT (OUTPATIENT)
Dept: CARDIOLOGY | Facility: CLINIC | Age: 74
End: 2019-03-05

## 2019-03-05 VITALS
HEART RATE: 55 BPM | BODY MASS INDEX: 25.79 KG/M2 | HEIGHT: 68 IN | OXYGEN SATURATION: 98 % | DIASTOLIC BLOOD PRESSURE: 70 MMHG | SYSTOLIC BLOOD PRESSURE: 138 MMHG | WEIGHT: 170.2 LBS

## 2019-03-05 DIAGNOSIS — E78.5 HYPERLIPIDEMIA LDL GOAL <100: ICD-10-CM

## 2019-03-05 DIAGNOSIS — I10 ESSENTIAL HYPERTENSION: ICD-10-CM

## 2019-03-05 DIAGNOSIS — I48.0 PAROXYSMAL ATRIAL FIBRILLATION (HCC): Primary | ICD-10-CM

## 2019-03-05 PROCEDURE — 99214 OFFICE O/P EST MOD 30 MIN: CPT | Performed by: INTERNAL MEDICINE

## 2019-03-05 RX ORDER — LISINOPRIL 10 MG/1
10 TABLET ORAL DAILY
Qty: 90 TABLET | Refills: 1
Start: 2019-03-05 | End: 2019-10-07 | Stop reason: SDUPTHER

## 2019-03-05 RX ORDER — FLECAINIDE ACETATE 150 MG/1
150 TABLET ORAL 2 TIMES DAILY
COMMUNITY
End: 2019-03-05 | Stop reason: SDUPTHER

## 2019-03-05 RX ORDER — FLECAINIDE ACETATE 150 MG/1
150 TABLET ORAL 2 TIMES DAILY
Qty: 180 TABLET | Refills: 1 | Status: SHIPPED | OUTPATIENT
Start: 2019-03-05 | End: 2019-03-07 | Stop reason: SDUPTHER

## 2019-03-05 RX ORDER — ATORVASTATIN CALCIUM 20 MG/1
20 TABLET, FILM COATED ORAL NIGHTLY
Qty: 90 TABLET | Refills: 1 | Status: SHIPPED | OUTPATIENT
Start: 2019-03-05 | End: 2019-09-08 | Stop reason: SDUPTHER

## 2019-03-06 PROCEDURE — 93000 ELECTROCARDIOGRAM COMPLETE: CPT | Performed by: INTERNAL MEDICINE

## 2019-03-06 NOTE — ASSESSMENT & PLAN NOTE
· No symptomatic atrial fibrillation with flecainide  · Acceptable EKG today  · Continue flecainide and Eliquis  · No AV vu blocking agent due to bradycardia

## 2019-03-07 ENCOUNTER — TELEPHONE (OUTPATIENT)
Dept: CARDIOLOGY | Facility: CLINIC | Age: 74
End: 2019-03-07

## 2019-03-07 DIAGNOSIS — I48.0 PAROXYSMAL ATRIAL FIBRILLATION (HCC): ICD-10-CM

## 2019-03-07 RX ORDER — FLECAINIDE ACETATE 150 MG/1
75 TABLET ORAL 2 TIMES DAILY
Qty: 180 TABLET | Refills: 1 | Status: SHIPPED | OUTPATIENT
Start: 2019-03-07 | End: 2020-03-17 | Stop reason: SDUPTHER

## 2019-04-19 RX ORDER — LISINOPRIL 10 MG/1
TABLET ORAL
Qty: 90 TABLET | Refills: 1 | Status: SHIPPED | OUTPATIENT
Start: 2019-04-19 | End: 2019-09-10 | Stop reason: SDUPTHER

## 2019-08-12 ENCOUNTER — APPOINTMENT (OUTPATIENT)
Dept: PULMONOLOGY | Facility: CLINIC | Age: 74
End: 2019-08-12
Payer: MEDICARE

## 2019-08-12 VITALS
TEMPERATURE: 98.1 F | WEIGHT: 260 LBS | DIASTOLIC BLOOD PRESSURE: 72 MMHG | HEIGHT: 71 IN | SYSTOLIC BLOOD PRESSURE: 118 MMHG | RESPIRATION RATE: 15 BRPM | HEART RATE: 72 BPM | BODY MASS INDEX: 36.4 KG/M2 | OXYGEN SATURATION: 96 %

## 2019-08-12 PROCEDURE — 99214 OFFICE O/P EST MOD 30 MIN: CPT

## 2019-08-13 NOTE — HISTORY OF PRESENT ILLNESS
[Obstructive Sleep Apnea] : obstructive sleep apnea [ESS: ___] : ESS score [unfilled] [DMS] : DMS [To Bed: ___] : ~he/she~ goes to bed at [unfilled] [Arises: ___] : arises at [unfilled] [Sleep Onset Latency: ___ minutes] : sleep onset latency of [unfilled] minutes reported [Nocturnal Awakenings: ___] : ~he/she~ typically has [unfilled] nocturnal awakenings [TST: ___] : Total sleep time is [unfilled] [WASO: ___] : Wake time after sleep onset is [unfilled] [Daytime Sleep: ___] : daytime sleep: [unfilled] [AHI: ___ per hour] : Apnea-hypopnea index:  [unfilled] per hour [T90%: ___] : T90%: [unfilled]% [Date: ___] : the most recent therapeutic polysomnogram was completed [unfilled] [CPAP: ___ cmH2O] : CPAP: [unfilled] cmH2O [FreeTextEntry1] : This is a  73 year old male with a long history of severe obstructive sleep apnea treated with CPAP, presents for follow-up, last visit in 2018.\par \par Comorbid medical conditions include:  hypertension, hyperlipidemia, anxiety, obesity; history of DVT, thyroid and prostate cancer history.\par \par The patient was diagnosed with BAUTISTA in 1999.  He has been treated with CPAP since, currently at 8 cmH2O. He reports an improvement in sleep quality and daytime somnolence. He is tolerating the pressure and nasal mask well.  He uses CPAP on a nightly basis for 6 hours.  He is receiving supplies regularly from [employer].  He reports that  for years, on rare occasions he has difficulty falling back asleep when he wakes up to urinate, which may take up to 3 hours.  For this rare sleep maintenance insomnia, he takes alprazolam 0.5 mg, approximately twice weekly, with good relief and no reported adverse effects.  \par \par He consumes 2 cups of caffeinated coffee in the morning and 1 cup of caffeinated tea in the evening.  He denies hypersomnolence, fragmented sleep, or weight gain.   [Snoring] : no snoring [Frequent Nocturnal Awakening] : no nocturnal awakening [Witnessed Apneas] : no witnessed sleep apnea [Unintentional Sleep while Active] : no unintentional sleep while active [Daytime Somnolence] : no daytime somnolence [Awakes Unrefreshed] : does not awake unrefreshed [Unintentional Sleep While Inactive] : no unintentional sleep while inactive [Awakes with Headache] : no headache upon awakening [Recent  Weight Gain] : no recent weight gain [Awakening With Dry Mouth] : no dry mouth upon awakening [DIS] : no DIS

## 2019-08-13 NOTE — REVIEW OF SYSTEMS
[EDS: ESS=____] : daytime somnolence: ESS=[unfilled] [Nocturia] : nocturia [Negative] : Psychiatric [Lower Extremity Discomfort] : no lower extremity discomfort [Irresistible urge to move legs] : no irresistible urge to move legs because of lower extremity discomfort [Late day/ Evening symptoms] : no late day/evening symptoms [Unusual Sleep Behavior] : no unusual sleep behavior [Sleep Disturbances due to LE symptoms] : ~T no sleep disturbances due to lower extremity symptoms [Hypersomnolence] : not sleeping much more than usual [Cataplexy] :  no cataplexy [Sleep Paralysis] : no sleep paralysis

## 2019-08-13 NOTE — PHYSICAL EXAM
[Normal Appearance] : normal appearance [General Appearance - In No Acute Distress] : no acute distress [Low Lying Soft Palate] : low lying soft palate [Normal Conjunctiva] : the conjunctiva exhibited no abnormalities [Enlarged Base of the Tongue] : enlargement of the base of the tongue [Elongated Uvula] : elongated uvula [Heart Rate And Rhythm] : heart rate was normal and rhythm regular [III] : III [Murmurs] : no murmurs [Auscultation Breath Sounds / Voice Sounds] : lungs were clear to auscultation bilaterally [Involuntary Movements] : no involuntary movements were seen [Cyanosis, Localized] : no localized cyanosis [No Focal Deficits] : no focal deficits [Affect] : the affect was normal [Oriented To Time, Place, And Person] : oriented to person, place, and time [Mood] : the mood was normal [] : the neck was supple [Neck Appearance] : the appearance of the neck was normal

## 2019-08-13 NOTE — HISTORY OF PRESENT ILLNESS
[Obstructive Sleep Apnea] : obstructive sleep apnea [ESS: ___] : ESS score [unfilled] [DMS] : DMS [Arises: ___] : arises at [unfilled] [To Bed: ___] : ~he/she~ goes to bed at [unfilled] [Sleep Onset Latency: ___ minutes] : sleep onset latency of [unfilled] minutes reported [Nocturnal Awakenings: ___] : ~he/she~ typically has [unfilled] nocturnal awakenings [WASO: ___] : Wake time after sleep onset is [unfilled] [TST: ___] : Total sleep time is [unfilled] [Daytime Sleep: ___] : daytime sleep: [unfilled] [AHI: ___ per hour] : Apnea-hypopnea index:  [unfilled] per hour [Date: ___] : the most recent therapeutic polysomnogram was completed [unfilled] [T90%: ___] : T90%: [unfilled]% [CPAP: ___ cmH2O] : CPAP: [unfilled] cmH2O [FreeTextEntry1] : This is a  73 year old male with a long history of severe obstructive sleep apnea treated with CPAP, presents for follow-up, last visit in 2018.\par \par Comorbid medical conditions include:  hypertension, hyperlipidemia, anxiety, obesity; history of DVT, thyroid and prostate cancer history.\par \par The patient was diagnosed with BAUTISTA in 1999.  He has been treated with CPAP since, currently at 8 cmH2O. He reports an improvement in sleep quality and daytime somnolence. He is tolerating the pressure and nasal mask well.  He uses CPAP on a nightly basis for 6 hours.  He is receiving supplies regularly from [employer].  He reports that  for years, on rare occasions he has difficulty falling back asleep when he wakes up to urinate, which may take up to 3 hours.  For this rare sleep maintenance insomnia, he takes alprazolam 0.5 mg, approximately twice weekly, with good relief and no reported adverse effects.  \par \par He consumes 2 cups of caffeinated coffee in the morning and 1 cup of caffeinated tea in the evening.  He denies hypersomnolence, fragmented sleep, or weight gain.   [Snoring] : no snoring [Witnessed Apneas] : no witnessed sleep apnea [Frequent Nocturnal Awakening] : no nocturnal awakening [Unintentional Sleep while Active] : no unintentional sleep while active [Daytime Somnolence] : no daytime somnolence [Awakes with Headache] : no headache upon awakening [Unintentional Sleep While Inactive] : no unintentional sleep while inactive [Awakes Unrefreshed] : does not awake unrefreshed [Recent  Weight Gain] : no recent weight gain [Awakening With Dry Mouth] : no dry mouth upon awakening [DIS] : no DIS

## 2019-08-13 NOTE — ASSESSMENT
[FreeTextEntry1] : This is a 73 year old male with severe BAUTISTA on CPAP here for a follow up visit.  His machine no longer has a data chip that is readible but has been compliant with his machine since 1999. He is eligible for a new machine and would like one. The patient is experiencing benefit from CPAP and should continue to use. I will order him a new machine through his DME. He will come back in 2-3 months after receiveing his machine for a face to face follow up.

## 2019-08-13 NOTE — REVIEW OF SYSTEMS
[EDS: ESS=____] : daytime somnolence: ESS=[unfilled] [Nocturia] : nocturia [Negative] : Musculoskeletal [Lower Extremity Discomfort] : no lower extremity discomfort [Irresistible urge to move legs] : no irresistible urge to move legs because of lower extremity discomfort [Late day/ Evening symptoms] : no late day/evening symptoms [Sleep Disturbances due to LE symptoms] : ~T no sleep disturbances due to lower extremity symptoms [Unusual Sleep Behavior] : no unusual sleep behavior [Cataplexy] :  no cataplexy [Hypersomnolence] : not sleeping much more than usual [Sleep Paralysis] : no sleep paralysis

## 2019-08-13 NOTE — PHYSICAL EXAM
[General Appearance - In No Acute Distress] : no acute distress [Normal Appearance] : normal appearance [Normal Conjunctiva] : the conjunctiva exhibited no abnormalities [Low Lying Soft Palate] : low lying soft palate [Enlarged Base of the Tongue] : enlargement of the base of the tongue [Elongated Uvula] : elongated uvula [Heart Rate And Rhythm] : heart rate was normal and rhythm regular [III] : III [Murmurs] : no murmurs [Auscultation Breath Sounds / Voice Sounds] : lungs were clear to auscultation bilaterally [Involuntary Movements] : no involuntary movements were seen [Cyanosis, Localized] : no localized cyanosis [No Focal Deficits] : no focal deficits [Oriented To Time, Place, And Person] : oriented to person, place, and time [Affect] : the affect was normal [Neck Appearance] : the appearance of the neck was normal [] : the neck was supple [Mood] : the mood was normal

## 2019-09-05 NOTE — PROGRESS NOTES
Encounter Date:09/10/2019    Patient ID: Jovon Gracia is a 73 y.o. male who is  and resides in [place], [place].  He is a daylilly horticulturist    CC/Reason for visit:  Atrial Fibrillation           Problem List Items Addressed This Visit        Cardiovascular and Mediastinum    Paroxysmal atrial fibrillation (CMS/HCC) - Primary    Overview     · CHADS-VASc =2 (age, HTN)  · Echocardiogram (10/21/2014): LVEF 55% to 60%, no valvular or wall motion abnormalities.  · Cardiolite stress test (11/6/2014): normal LV function and no evidence of ischemia with mild diaphragmatic attenuation.  · Event monitoring showing atrial fibrillation, symptomatic with palpitations and chest pain.          Current Assessment & Plan     · Continue flecainide 75 mg twice daily  · Continue Eliquis 5 mg twice daily         Relevant Orders    ECG 12 Lead    Essential hypertension    Current Assessment & Plan     · Hypertension is controlled  · Continue lisinopril 10 mg daily         Hyperlipidemia LDL goal <100    Overview     · Moderate intensity statin therapy indicated for primary prevention         Current Assessment & Plan     · Continue Lipitor 20 mg daily            Nervous and Auditory    Atypical chest pain    Overview     · Exercise nuclear stress (11/6/14): Probably normal study. Inferior wall defect likely attenuation artifact. Normal LVEF.  · Reports increased fatigue 09/10/2019         Current Assessment & Plan     · Arrange stress testing at [facility]           Relevant Orders    Stress Test With Myocardial Perfusion (1 Day)        The patient has some vague symptoms including increased fatigue, orthopnea and some elevated blood pressures.  It is been since 2014 that he had an ischemic evaluation therefore I will order a myocardial perfusion study.  EKG today has an acceptable QT interval and he is maintaining sinus bradycardia on flecainide therapy.  When comparing with previous EKG there is a left anterior fascicular  block and some nonspecific T wave abnormalities that were not present last visit.  We will continue his current medications and further recommendations will follow testing.       · Schedule myocardial perfusion study.  If normal patient is cleared to have his surgeries in the upcoming future  · Continue current medications  Return in about 6 months (around 3/10/2020), or if symptoms worsen or fail to improve, for Follow-up with Dr. Dial next visit.            Jovon Gracia returns today for follow-up of his paroxysmal atrial fibrillation and cardiac risk factors.  Since his last visit the patient has not experienced any chest pain or pressure or increased shortness of breath but has noticed he is unable to lie flat at night due to feeling a little smothered.  He works outside during the summer as a horticulturist and this summer he has been intolerant to the heat often happen to Band and rest multiple times.  He even tried getting up early in the morning and working late at night to avoid the heat but he does not have the stamina that he did last year.  His wife says that frequently throughout the day he has to come in and lie down to rest.  He reports increased fatigue and weakness particularly in his right arm.  He has known right carpal tunnel syndrome and is unsure if the weakness is related to that.  He anticipates undergoing a colonoscopy in November and may plan on having a umbilical hernia repair and possible carpal tunnel surgery in the next few months.  Since his last visit he did have a basal cell carcinoma removed from his head.  There has been a couple of instances where in the morning his blood pressure has been elevated with a systolic of 1 50-1 80.  He often checks his pulse with his phone and there has been a few instances where he has had fast rates up to 180 but they are usually brief.  He is chronically anticoagulated with Eliquis and tolerating without reports of active or overt  "bleeding.    Review of Systems   Constitution: Positive for malaise/fatigue. Negative for weakness.   HENT: Positive for hearing loss.    Eyes: Negative for vision loss in left eye and vision loss in right eye.   Cardiovascular: Positive for irregular heartbeat and orthopnea. Negative for chest pain, dyspnea on exertion, near-syncope, palpitations, paroxysmal nocturnal dyspnea and syncope.   Hematologic/Lymphatic: Positive for adenopathy.   Musculoskeletal: Positive for muscle weakness. Negative for myalgias.   Neurological: Negative for brief paralysis, excessive daytime sleepiness, focal weakness, numbness and paresthesias.   Psychiatric/Behavioral: The patient is nervous/anxious.    All other systems reviewed and are negative.      The patient's past medical, social, family history and ROS reviewed in the patient's electronic medical record.    Allergies  Other    Outpatient Medications Marked as Taking for the 9/10/19 encounter (Office Visit) with Mariana Germain APRN   Medication Sig Dispense Refill   • apixaban (ELIQUIS) 5 MG tablet tablet Take 1 tablet by mouth Every 12 (Twelve) Hours. 180 tablet 1   • atorvastatin (LIPITOR) 20 MG tablet TAKE 1 TABLET BY MOUTH EVERY NIGHT 90 tablet 1   • flecainide (TAMBOCOR) 150 MG tablet Take 0.5 tablets by mouth 2 (Two) Times a Day. 180 tablet 1   • lisinopril (PRINIVIL,ZESTRIL) 10 MG tablet Take 1 tablet by mouth Daily. 90 tablet 1           Blood pressure 122/72, pulse 63, height 172.7 cm (68\"), weight 70.8 kg (156 lb), SpO2 96 %.  Body mass index is 23.72 kg/m².  Vitals:    09/10/19 1102   Patient Position: Sitting       Physical Exam   Constitutional: He is oriented to person, place, and time. He appears well-developed and well-nourished.   HENT:   Head: Normocephalic and atraumatic.   Eyes: Pupils are equal, round, and reactive to light. No scleral icterus.   Neck: No JVD present. Carotid bruit is not present. No thyromegaly present.   Cardiovascular: Normal rate " and regular rhythm. Exam reveals no gallop.   No murmur heard.  Pulmonary/Chest: Effort normal and breath sounds normal.   Abdominal: Soft. He exhibits no distension. There is no hepatosplenomegaly.   Musculoskeletal: He exhibits no edema.   Neurological: He is alert and oriented to person, place, and time.   Skin: Skin is warm and dry.   Psychiatric: He has a normal mood and affect. His behavior is normal.       Data Review (reviewed with patient):       ECG 12 Lead  Date/Time: 9/10/2019 11:23 AM  Performed by: Mariana Germain APRN  Authorized by: Mariana Germain APRN   Comparison: compared with previous ECG from 3/5/2019  Comparison to previous ECG: Previous EKG showed sinus bradycardia with right axis deviation.  Current EKG shows sinus bradycardia with first-degree AV block and left anterior fascicular block  Rhythm: sinus bradycardia  Rate: bradycardic  BPM: 56  Conduction: left anterior fascicular block and 1st degree AV block    Clinical impression: abnormal EKG  Comments: Sinus bradycardia with first-degree AV block and left anterior fascicular block  QRS duration 88 MS  QT/QTc 424/409 MS            Lab Results   Component Value Date    CHOL 120 08/21/2018    TRIG 33 08/21/2018    HDL 44 08/21/2018    LDL 68 08/21/2018    AST 24 08/21/2018    ALT 19 08/21/2018       No results found for: HGBA1C        MIRIAM Zamora  9/10/2019

## 2019-09-08 DIAGNOSIS — E78.5 HYPERLIPIDEMIA LDL GOAL <100: ICD-10-CM

## 2019-09-09 RX ORDER — ATORVASTATIN CALCIUM 20 MG/1
20 TABLET, FILM COATED ORAL NIGHTLY
Qty: 90 TABLET | Refills: 1 | Status: SHIPPED | OUTPATIENT
Start: 2019-09-09 | End: 2020-03-04

## 2019-09-10 ENCOUNTER — OFFICE VISIT (OUTPATIENT)
Dept: CARDIOLOGY | Facility: CLINIC | Age: 74
End: 2019-09-10

## 2019-09-10 VITALS
HEIGHT: 68 IN | SYSTOLIC BLOOD PRESSURE: 122 MMHG | DIASTOLIC BLOOD PRESSURE: 72 MMHG | BODY MASS INDEX: 23.64 KG/M2 | OXYGEN SATURATION: 96 % | HEART RATE: 63 BPM | WEIGHT: 156 LBS

## 2019-09-10 DIAGNOSIS — E78.5 HYPERLIPIDEMIA LDL GOAL <100: ICD-10-CM

## 2019-09-10 DIAGNOSIS — I48.0 PAROXYSMAL ATRIAL FIBRILLATION (HCC): Primary | ICD-10-CM

## 2019-09-10 DIAGNOSIS — R07.89 ATYPICAL CHEST PAIN: ICD-10-CM

## 2019-09-10 DIAGNOSIS — I10 ESSENTIAL HYPERTENSION: ICD-10-CM

## 2019-09-10 PROCEDURE — 99214 OFFICE O/P EST MOD 30 MIN: CPT | Performed by: NURSE PRACTITIONER

## 2019-09-10 PROCEDURE — 93000 ELECTROCARDIOGRAM COMPLETE: CPT | Performed by: NURSE PRACTITIONER

## 2019-09-27 ENCOUNTER — TELEPHONE (OUTPATIENT)
Dept: CARDIOLOGY | Facility: CLINIC | Age: 74
End: 2019-09-27

## 2019-09-27 NOTE — TELEPHONE ENCOUNTER
FROM SCHEDULING-This patient called today to cancel his stress testing.  He stated the testing was a little more than what he expected and would like to revisit in late October.

## 2019-10-04 ENCOUNTER — APPOINTMENT (OUTPATIENT)
Dept: CARDIOLOGY | Facility: HOSPITAL | Age: 74
End: 2019-10-04

## 2019-10-07 RX ORDER — LISINOPRIL 10 MG/1
TABLET ORAL
Qty: 90 TABLET | Refills: 1 | Status: SHIPPED | OUTPATIENT
Start: 2019-10-07 | End: 2020-03-17 | Stop reason: SDUPTHER

## 2019-11-03 ENCOUNTER — APPOINTMENT (OUTPATIENT)
Dept: MRI IMAGING | Facility: IMAGING CENTER | Age: 74
End: 2019-11-03
Payer: MEDICARE

## 2019-11-03 ENCOUNTER — OUTPATIENT (OUTPATIENT)
Dept: OUTPATIENT SERVICES | Facility: HOSPITAL | Age: 74
LOS: 1 days | End: 2019-11-03
Payer: MEDICARE

## 2019-11-03 DIAGNOSIS — Z00.8 ENCOUNTER FOR OTHER GENERAL EXAMINATION: ICD-10-CM

## 2019-11-03 PROCEDURE — A9585: CPT

## 2019-11-03 PROCEDURE — 74183 MRI ABD W/O CNTR FLWD CNTR: CPT

## 2019-11-03 PROCEDURE — 74183 MRI ABD W/O CNTR FLWD CNTR: CPT | Mod: 26

## 2019-11-05 ENCOUNTER — EMERGENCY (EMERGENCY)
Facility: HOSPITAL | Age: 74
LOS: 1 days | Discharge: HOME | End: 2019-11-05
Attending: EMERGENCY MEDICINE
Payer: MEDICARE

## 2019-11-05 VITALS
OXYGEN SATURATION: 96 % | SYSTOLIC BLOOD PRESSURE: 113 MMHG | HEART RATE: 63 BPM | TEMPERATURE: 98 F | RESPIRATION RATE: 18 BRPM | DIASTOLIC BLOOD PRESSURE: 71 MMHG

## 2019-11-05 VITALS
WEIGHT: 268.08 LBS | HEART RATE: 75 BPM | SYSTOLIC BLOOD PRESSURE: 143 MMHG | DIASTOLIC BLOOD PRESSURE: 86 MMHG | RESPIRATION RATE: 20 BRPM | OXYGEN SATURATION: 98 % | TEMPERATURE: 98 F

## 2019-11-05 PROCEDURE — 99284 EMERGENCY DEPT VISIT MOD MDM: CPT | Mod: 25

## 2019-11-05 PROCEDURE — 93971 EXTREMITY STUDY: CPT | Mod: 26

## 2019-11-05 PROCEDURE — 93971 EXTREMITY STUDY: CPT

## 2019-11-05 PROCEDURE — 99284 EMERGENCY DEPT VISIT MOD MDM: CPT | Mod: GC

## 2019-11-05 RX ORDER — ACETAMINOPHEN 500 MG
975 TABLET ORAL ONCE
Refills: 0 | Status: COMPLETED | OUTPATIENT
Start: 2019-11-05 | End: 2019-11-05

## 2019-11-05 RX ORDER — CEPHALEXIN 500 MG
500 CAPSULE ORAL ONCE
Refills: 0 | Status: COMPLETED | OUTPATIENT
Start: 2019-11-05 | End: 2019-11-05

## 2019-11-05 RX ORDER — CEPHALEXIN 500 MG
1 CAPSULE ORAL
Qty: 28 | Refills: 0
Start: 2019-11-05 | End: 2019-11-11

## 2019-11-05 RX ADMIN — Medication 500 MILLIGRAM(S): at 15:25

## 2019-11-05 NOTE — ED PROVIDER NOTE - OBJECTIVE STATEMENT
72yo male HTN, h/o RLE DVT 6 years ago no longer on AC p/w 1 week of RLE pain and swelling after tripping. For the past week the swelling has been constant and the skin has become slightly erythematous the past 4 days. He is able to walk and denies dyspnea, chest pain, fever, hitting his head or LOC after the trip and fall. His left ankle is also bruised but has not had pain there. 72yo male HTN, h/o RLE DVT 6 years ago no longer on AC p/w 1 week of RLE pain and swelling after tripping. For the past week the swelling has been constant and the skin has become slightly erythematous the past 4 days. He is able to walk and denies dyspnea, chest pain, fever, hitting his head or LOC after the trip and fall. His left ankle is also bruised but has not had pain there. UTD on tetanus.

## 2019-11-05 NOTE — ED PROVIDER NOTE - PHYSICAL EXAMINATION
Physical Exam:  Gen: NAD, AOx3, non-toxic appearing, able to ambulate without assistance  Head: NCAT  HEENT: EOMI, PEERLA, normal conjunctiva, tongue midline, oral mucosa moist  Lung: CTAB, no respiratory distress, no wheezes/rhonchi/rales B/L, speaking in full sentences  CV: RRR, no murmurs, rubs or gallops  Abd: soft, NT, ND, no guarding, no rigidity, no rebound tenderness, no CVA tenderness   MSK: no visible deformities, ROM normal in UE/LE, no back pain  Neuro: No focal sensory or motor deficits  Skin: Warm, well perfused, no rash, no leg swelling  Psych: normal affect, calm Physical Exam:  Gen: NAD, AOx3, non-toxic appearing, able to ambulate without assistance  Head: NCAT  HEENT: EOMI, PEERLA, normal conjunctiva, tongue midline, oral mucosa moist  Lung: CTAB, no respiratory distress, no wheezes/rhonchi/rales B/L, speaking in full sentences  CV: RRR, no murmurs, rubs or gallops, distal pulses 2+ b/l  Abd: soft, NT, ND, no guarding, no rigidity, no rebound tenderness, no CVA tenderness   MSK: mild TTP and swelling right lateral lower tibial area, no calf pain, no tibial point TTP b/l, mild bruising of left medial ankle, no TTP of either ankle, ROM normal in UE/LE, no back pain  Neuro: normal gait, no focal sensory or motor deficits  Skin: mild erythema right lateral lower tibial area, equal warmth b/l LE's, well perfused,   Psych: normal affect, calm

## 2019-11-05 NOTE — ED PROVIDER NOTE - CARE PLAN
Principal Discharge DX:	Swelling of right lower extremity Principal Discharge DX:	Swelling of right lower extremity  Goal:	LEFT  Secondary Diagnosis:	Cellulitis of lower leg

## 2019-11-05 NOTE — ED ADULT NURSE NOTE - PMH
Asthma    BPH (benign prostatic hypertrophy)    HTN (hypertension)    Hyperlipidemia    BAUTISTA on CPAP    Prostate ca  seed implants 6/2012  Seasonal allergies    Thyroid cancer  2006

## 2019-11-05 NOTE — ED PROVIDER NOTE - PATIENT PORTAL LINK FT
You can access the FollowMyHealth Patient Portal offered by [facility] by registering at the following website: [email or web address]. By joining [facility]’s FollowMyHealth portal, you will also be able to view your health information using other applications (apps) compatible with our system.

## 2019-11-05 NOTE — ED PROVIDER NOTE - CLINICAL SUMMARY MEDICAL DECISION MAKING FREE TEXT BOX
72 yo male HTN, h/o DVT 6 years ago not on AC p/w RLE swelling, erythema and pain after fall 1 week ago. R/o DVT. Fracture unlikely. May consider cellulitis if ultrasound negative and DC with antibiotics.

## 2019-11-05 NOTE — ED PROVIDER NOTE - NS ED ROS FT
ROS:  GENERAL: No fever, no chills  EYES: no change in vision  HEENT: no trouble swallowing, no trouble speaking  CARDIAC: no chest pain  PULMONARY: no cough, no shortness of breath  GI: no abdominal pain, no nausea, no vomiting, no diarrhea, no constipation  : No dysuria, no frequency, no change in appearance, or odor of urine  SKIN: mild erythema of RLE   NEURO: no headache, no weakness  MSK: RLE pain and swelling

## 2019-11-05 NOTE — ED PROVIDER NOTE - PROGRESS NOTE DETAILS
Attending MD Carpio: Transport to take patient to [facility] now Attending MD Carpio: Spoke with Dr. Garcia, discussed having patient evaluated before week's end, reports patient will be fit in if office is called.  Will Rx Keflex and demarcate area.  Tetanus UTD.

## 2019-11-05 NOTE — ED PROVIDER NOTE - ATTENDING CONTRIBUTION TO CARE
Attending MD Carpio: I personally have seen and examined this patient.  Resident note reviewed and agree on plan of care and except where noted.  See below for details.     Seen in [facility], accompanied by wife    73M with PMH/PSH including HTN, RLE DVT (6 yrs ago, not on AC) presents to the ED with one week of RLE pain and swelling.  Reports tripped on a step on bleachers, did not fall. Denies preceding dizziness, weakness, sensory changes.  Denies LOC, hitting head.  Reports has chronic lower extremity edema, reports worse over the last week.  Reports noted redness to anterior aspect of LLE for 4 days.  Reports is able to walk, denies fevers, chills. Denies chest pain, shortness of breath, palpitations. On exam, NAD, head NCAT, ranging neck freely, unlabored breathing, moving all extremities, +2 bilateral pitting edema, +6cm x 3cm area of erythema to the distal third of the anterior R shin, no fluctuance, +induration, no calf tenderness, +2 DPs, sensory grossly intact; A/P: 73M with RLE pain and swelling, given history will rule out DVT, otherwise suspect cellulitis and will give abx

## 2019-11-05 NOTE — ED PROVIDER NOTE - NSFOLLOWUPINSTRUCTIONS_ED_ALL_ED_FT
1. TAKE KEFLEX 500 MG 4 TIMES PER DAY FOR 7 DAYS  2. FOR PAIN OR FEVER YOU CAN TAKE IBUPROFEN (MOTRIN, ADVIL) OR ACETAMINOPHEN (TYLENOL) AS NEEDED, AS DIRECTED ON PACKAGING.  3. FOLLOW UP WITH YOUR PRIMARY DOCTOR WITHIN 2-3 DAYS AS DIRECTED.  4. IF YOU HAD LABS OR IMAGING DONE, YOU WERE GIVEN COPIES OF ALL LABS AND/OR IMAGING RESULTS FROM YOUR ER VISIT--PLEASE TAKE THEM WITH YOU TO YOUR FOLLOW UP APPOINTMENTS.  5. IF NEEDED, CALL PATIENT ACCESS SERVICES AT [phone] TO FIND A PRIMARY CARE PHYSICIAN.  OR CALL [phone] TO MAKE AN APPOINTMENT WITH THE CLINIC.  6. RETURN TO THE ER FOR WORSENING SWELLING, REDNESS OR FEVER OR ANY WORSENING SYMPTOMS OR CONCERNS.

## 2019-11-05 NOTE — ED ADULT NURSE NOTE - OBJECTIVE STATEMENT
73 yr old male with wife from home c/o R lower leg pain, redness, warmth to touch, +tenderness to palpation, localized, non pitting edema s/p trip and fall from bleecher at [employer] game, (no head injury/LOC), +ambulatory since fall, +hx DVT R lower leg s/p fall x 6 yrs ago, at present +pulse/caprefill/sensation intact, +small area of redness noted - tender to touch, no other c/o, say hips/knees intact

## 2019-11-11 ENCOUNTER — APPOINTMENT (OUTPATIENT)
Dept: VASCULAR SURGERY | Facility: CLINIC | Age: 74
End: 2019-11-11

## 2020-01-06 ENCOUNTER — TELEPHONE (OUTPATIENT)
Dept: CARDIOLOGY | Facility: CLINIC | Age: 75
End: 2020-01-06

## 2020-01-06 NOTE — TELEPHONE ENCOUNTER
Dr. Darleen Doty is requesting cardiac clearance for multiple extractions and full mouth periodontal surgery. OK to clear and hold Eliquis 3 days?

## 2020-01-11 DIAGNOSIS — I48.0 PAROXYSMAL ATRIAL FIBRILLATION (HCC): ICD-10-CM

## 2020-01-13 RX ORDER — APIXABAN 5 MG/1
TABLET, FILM COATED ORAL
Qty: 180 TABLET | Refills: 3 | Status: SHIPPED | OUTPATIENT
Start: 2020-01-13 | End: 2020-03-17 | Stop reason: SDUPTHER

## 2020-03-04 DIAGNOSIS — E78.5 HYPERLIPIDEMIA LDL GOAL <100: ICD-10-CM

## 2020-03-04 RX ORDER — ATORVASTATIN CALCIUM 20 MG/1
TABLET, FILM COATED ORAL
Qty: 90 TABLET | Refills: 0 | Status: SHIPPED | OUTPATIENT
Start: 2020-03-04 | End: 2020-03-17 | Stop reason: SDUPTHER

## 2020-03-17 DIAGNOSIS — E78.5 HYPERLIPIDEMIA LDL GOAL <100: ICD-10-CM

## 2020-03-17 DIAGNOSIS — I48.0 PAROXYSMAL ATRIAL FIBRILLATION (HCC): ICD-10-CM

## 2020-03-17 RX ORDER — FLECAINIDE ACETATE 150 MG/1
75 TABLET ORAL 2 TIMES DAILY
Qty: 180 TABLET | Refills: 1 | Status: SHIPPED | OUTPATIENT
Start: 2020-03-17 | End: 2020-08-17 | Stop reason: SDUPTHER

## 2020-03-17 RX ORDER — LISINOPRIL 10 MG/1
10 TABLET ORAL DAILY
Qty: 90 TABLET | Refills: 1 | Status: SHIPPED | OUTPATIENT
Start: 2020-03-17 | End: 2020-08-17 | Stop reason: SDUPTHER

## 2020-03-17 RX ORDER — ATORVASTATIN CALCIUM 20 MG/1
20 TABLET, FILM COATED ORAL NIGHTLY
Qty: 90 TABLET | Refills: 1 | Status: SHIPPED | OUTPATIENT
Start: 2020-03-17 | End: 2020-08-17 | Stop reason: SDUPTHER

## 2020-06-26 ENCOUNTER — OFFICE VISIT (OUTPATIENT)
Dept: CARDIOLOGY | Facility: CLINIC | Age: 75
End: 2020-06-26

## 2020-06-26 VITALS
WEIGHT: 156 LBS | HEIGHT: 68 IN | HEART RATE: 56 BPM | TEMPERATURE: 96.4 F | BODY MASS INDEX: 23.64 KG/M2 | DIASTOLIC BLOOD PRESSURE: 64 MMHG | SYSTOLIC BLOOD PRESSURE: 160 MMHG

## 2020-06-26 DIAGNOSIS — R07.89 ATYPICAL CHEST PAIN: ICD-10-CM

## 2020-06-26 DIAGNOSIS — I48.0 PAROXYSMAL ATRIAL FIBRILLATION (HCC): Primary | ICD-10-CM

## 2020-06-26 DIAGNOSIS — I10 ESSENTIAL HYPERTENSION: ICD-10-CM

## 2020-06-26 DIAGNOSIS — E78.5 HYPERLIPIDEMIA LDL GOAL <100: ICD-10-CM

## 2020-06-26 PROCEDURE — 93000 ELECTROCARDIOGRAM COMPLETE: CPT | Performed by: NURSE PRACTITIONER

## 2020-06-26 PROCEDURE — 99214 OFFICE O/P EST MOD 30 MIN: CPT | Performed by: NURSE PRACTITIONER

## 2020-06-26 NOTE — ASSESSMENT & PLAN NOTE
· No signs or symptoms of TIA or CVA  · Continue flecainide 75 mg twice daily  · Continue Eliquis 5 mg twice daily

## 2020-06-26 NOTE — PROGRESS NOTES
"[facility]  Office Visit Note    DATE: 06/26/2020    IDENTIFICATION: Jovon Gracia is a 74 y.o. male who is  and resides in [place], [place].  He is a daylilly horticulturist    REASON FOR VISIT:  • Chest pain  • Atrial fibrillation  • Hypertension  • Hyperlipidemia            Jovon Gracia returns today for follow-up of his atypical chest pain, paroxysmal atrial fibrillaton cardiac risk factors.  At his last visit the patient had some vague symptoms of fatigue and a stress test was ordered but the patient never had it performed.  She that he was due to go to [place] and did not want to \"upset his system\".  He still has fatigue but nothing that is progressed or worsened.  He denies exertional chest pain/pressure or tightness, dyspnea, orthopnea, palpitations or syncope.  He is on flecainide therapy for his atrial fibrillation and has been maintaining normal sinus rhythm as confirmed by EKG today.  He is on anticoagulation with Eliquis and tolerating without reports of active or overt bleeding.  He denies signs or symptoms that suggest a TIA or stroke.    Review of Systems   Constitution: Negative for malaise/fatigue.   Eyes: Negative for vision loss in left eye and vision loss in right eye.   Cardiovascular: Negative for chest pain, dyspnea on exertion, near-syncope, orthopnea, palpitations, paroxysmal nocturnal dyspnea and syncope.   Musculoskeletal: Negative for myalgias.   Neurological: Negative for brief paralysis, excessive daytime sleepiness, focal weakness, numbness, paresthesias and weakness.   All other systems reviewed and are negative.      The patient's past medical, social, family history and ROS reviewed in the patient's electronic medical record.    Allergies   Allergen Reactions   • Other       BETA-BLOCKERS, (bradycardia).            Current Outpatient Medications:   •  apixaban (Eliquis) 5 MG tablet tablet, Take 1 tablet by mouth Every 12 (Twelve) Hours., " "Disp: 180 tablet, Rfl: 3  •  atorvastatin (LIPITOR) 20 MG tablet, Take 1 tablet by mouth Every Night., Disp: 90 tablet, Rfl: 1  •  flecainide (TAMBOCOR) 150 MG tablet, Take 0.5 tablets by mouth 2 (Two) Times a Day., Disp: 180 tablet, Rfl: 1  •  lisinopril (PRINIVIL,ZESTRIL) 10 MG tablet, Take 1 tablet by mouth Daily., Disp: 90 tablet, Rfl: 1    Past Medical History:   Diagnosis Date   • Acid reflux 2016   • Carpal tunnel syndrome    • Hyperlipidemia    • Hypertension 2016   • PAF (paroxysmal atrial fibrillation) (CMS/HCC) 2016   • Seasonal allergies 2016   • Umbilical hernia        Past Surgical History:   Procedure Laterality Date   • HERNIA REPAIR      x2   • TONSILLECTOMY         Family History   Problem Relation Age of Onset   • Alzheimer's disease Mother    • Diabetes Father    • Heart failure Father        Social History     Tobacco Use   • Smoking status: Former Smoker     Years: 16.00     Types: Cigarettes     Last attempt to quit: 1976     Years since quittin.5   • Smokeless tobacco: Never Used   Substance Use Topics   • Alcohol use: No           Blood pressure 160/64, pulse 56, temperature 96.4 °F (35.8 °C), height 172.7 cm (68\"), weight 70.8 kg (156 lb).  Body mass index is 23.72 kg/m².  Vitals:    20 1120   Patient Position: Sitting       Physical Exam   Constitutional: He is oriented to person, place, and time. He appears well-developed and well-nourished.   HENT:   Head: Normocephalic and atraumatic.   Eyes: Conjunctivae are normal. No scleral icterus.   Neck: Normal range of motion. No JVD present. Carotid bruit is not present. No thyromegaly present.   Cardiovascular: Normal rate and regular rhythm. Exam reveals no gallop.   No murmur heard.  Pulmonary/Chest: Effort normal and breath sounds normal.   Abdominal: Soft. He exhibits no distension and no mass. There is no hepatosplenomegaly.   Musculoskeletal: He exhibits no edema.   Neurological: He is alert and " oriented to person, place, and time.   Skin: Skin is warm and dry. No rash noted.   Psychiatric: He has a normal mood and affect. His behavior is normal.       Data Review (reviewed with patient):       ECG 12 Lead  Date/Time: 6/26/2020 12:11 PM  Performed by: Mariana Germain APRN  Authorized by: Mariana Germain APRN   Comparison: compared with previous ECG from 9/10/2019  Similar to previous ECG  Rhythm: sinus bradycardia  BPM: 56  Conduction: incomplete right bundle branch block, left anterior fascicular block and 1st degree AV block    Clinical impression: abnormal EKG  Comments: Sinus bradycardia with first-degree AV block, incomplete right bundle branch block, left anterior fascicular block  QT/QTc 424/409 MS            Lab Results   Component Value Date    GLUCOSE 90 08/21/2018    BUN 17 08/21/2018    CREATININE 0.91 08/21/2018    EGFRIFNONA 82 08/21/2018    EGFRIFAFRI 101 08/15/2017    BCR 18.7 08/21/2018    K 4.3 08/21/2018    CO2 27.0 08/21/2018    CALCIUM 9.0 08/21/2018    ALBUMIN 4.37 08/21/2018    ALKPHOS 87 08/21/2018    AST 24 08/21/2018    ALT 19 08/21/2018      Lab Results   Component Value Date    CHOL 120 08/21/2018    CHLPL 120 08/21/2018    TRIG 33 08/21/2018    HDL 44 08/21/2018    LDL 68 08/21/2018     No results found for: HGBA1C  No results found for: WBC, HGB, HCT, MCV, PLT  No results found for: TSH         Problem List Items Addressed This Visit        Cardiovascular and Mediastinum    Essential hypertension    Current Assessment & Plan     · Continue lisinopril 10 mg daily         Hyperlipidemia LDL goal <100    Overview     · Moderate intensity statin therapy indicated for primary prevention         Current Assessment & Plan     · Continue Lipitor 20 mg daily         Paroxysmal atrial fibrillation (CMS/HCC) - Primary    Overview     · CHADS-VASc =2 (age, HTN)  · Echocardiogram (10/21/2014): LVEF 55% to 60%, no valvular or wall motion abnormalities.  · Cardiolite stress test  (11/6/2014): normal LV function and no evidence of ischemia with mild diaphragmatic attenuation.  · Event monitoring showing atrial fibrillation, symptomatic with palpitations and chest pain.          Current Assessment & Plan     · No signs or symptoms of TIA or CVA  · Continue flecainide 75 mg twice daily  · Continue Eliquis 5 mg twice daily            Nervous and Auditory    Atypical chest pain    Overview     · Exercise nuclear stress (11/6/14): Probably normal study. Inferior wall defect likely attenuation artifact. Normal LVEF.  · Reports increased fatigue 09/10/2019             Patient is doing well from a cardiovascular standpoint.  He is maintaining normal sinus rhythm.  We will defer stress testing as he has no exertional symptoms.  His systolic blood pressure was elevated at 160 but the patient tells me he was a little anxious as it is the first time he has come out of his home in 3 months due to the Covid-19 pandemic.  If SBP elevated next follow-up consider titration of antihypertensives.  He will follow-up in 6 months or sooner if needed.       · If SBP elevated at next visit consider titration of lisinopril  · Continue current medication  Return in about 6 months (around 12/26/2020), or if symptoms worsen or fail to improve, for Follow-up with Dr. Dial next visit.    MIRIAM Zamora, CCRN    6/26/2020

## 2020-07-21 ENCOUNTER — FORM ENCOUNTER (OUTPATIENT)
Age: 75
End: 2020-07-21

## 2020-08-17 ENCOUNTER — FORM ENCOUNTER (OUTPATIENT)
Age: 75
End: 2020-08-17

## 2020-08-17 DIAGNOSIS — I48.0 PAROXYSMAL ATRIAL FIBRILLATION (HCC): ICD-10-CM

## 2020-08-17 DIAGNOSIS — E78.5 HYPERLIPIDEMIA LDL GOAL <100: ICD-10-CM

## 2020-08-17 RX ORDER — LISINOPRIL 10 MG/1
10 TABLET ORAL DAILY
Qty: 90 TABLET | Refills: 1 | Status: SHIPPED | OUTPATIENT
Start: 2020-08-17 | End: 2020-09-24

## 2020-08-17 RX ORDER — ATORVASTATIN CALCIUM 20 MG/1
20 TABLET, FILM COATED ORAL NIGHTLY
Qty: 90 TABLET | Refills: 1 | Status: SHIPPED | OUTPATIENT
Start: 2020-08-17 | End: 2021-06-01

## 2020-08-17 RX ORDER — FLECAINIDE ACETATE 150 MG/1
75 TABLET ORAL 2 TIMES DAILY
Qty: 90 TABLET | Refills: 1 | Status: SHIPPED | OUTPATIENT
Start: 2020-08-17 | End: 2021-07-30

## 2020-08-25 ENCOUNTER — APPOINTMENT (OUTPATIENT)
Dept: PULMONOLOGY | Facility: CLINIC | Age: 75
End: 2020-08-25
Payer: MEDICARE

## 2020-08-25 ENCOUNTER — APPOINTMENT (OUTPATIENT)
Dept: PULMONOLOGY | Facility: CLINIC | Age: 75
End: 2020-08-25

## 2020-08-25 DIAGNOSIS — G47.00 INSOMNIA, UNSPECIFIED: ICD-10-CM

## 2020-08-25 PROCEDURE — 99442: CPT | Mod: 95

## 2020-08-25 NOTE — HISTORY OF PRESENT ILLNESS
[Home] : at home, [unfilled] , at the time of the visit. [Other Location: e.g. Home (Enter Location, City,State)___] : at [unfilled] [Verbal consent obtained from patient] : the patient, [unfilled] [FreeTextEntry1] : 74 year old ADRIANA BRIAN with severe obstructive sleep apnea on CPAP therapy since 1999, presents for follow-up after receiving a replacement CPAP device.\par \par PMH:  Hypertension, Hyperlipidemia, DVT 2013, Thyroidectomy ([place]), Prostate CA, Obesity\par  \par The patient has been on CPAP therapy since he was diagnosed with severe BAUTISTA (AHI 37.7) in 1999, tolerating current pressure at 8 cmH2O with a nasal mask.  With nightly use of CPAP for ~5.5 hours, patient reports "sound sleep", awaken refreshed, not tired during the day, and "cannot sleep without it".\par \par Patient reports rarely, due to loose mask, he awakens with a sore throat.  In the morning after removing the mask, he continues to experience clear rhinitis, which resolves when he arises.  He was advised in the past to utilize nasal spray, but he declined.  \par \par SLEEP:  5.5 hours between 11PM-5:30AM, with occasional 20-30 minute refreshing nap after lunch.  rare doze while watching TV.  It takes 20-30 minutes to fall asleep.  He usually sleeps through the night.  Rarely he has difficulty returning to sleep after 2AM awakening to urinate.\par \par He continues alprazolam 0.5 mg, approximately twice weekly, for SLEEP MAINTENANCE INSOMNIA, which he states is effective without adverse effects.  In the past he tried melatonin, stating it was "completely useless".  No other hypnotic use reported. [Witnessed Apneas] : no witnessed sleep apnea [Snoring] : no snoring [Frequent Nocturnal Awakening] : no nocturnal awakening [Daytime Somnolence] : no daytime somnolence [Unintentional Sleep while Active] : no unintentional sleep while active [Awakening With Dry Mouth] : no dry mouth upon awakening [Awakes Unrefreshed] : does not awake unrefreshed [Awakes with Headache] : no headache upon awakening [Recent  Weight Gain] : no recent weight gain [Unusual Sleep Behavior] : no unusual sleep behavior [DIS] : no DIS [Nocturnal Oxygen] : The patient does not use nocturnal oxygen [Lower Extremity Discomfort] : no lower extremity discomfort in evening or at bedtime

## 2020-08-25 NOTE — REVIEW OF SYSTEMS
[Fatigue] : no fatigue [Nasal Congestion] : no nasal congestion [Shortness Of Breath] : no shortness of breath [Postnasal Drip] : no postnasal drip [Edema] : ~T edema was not present [Palpitations] : no palpitations [Chest Pain] : no chest pain [Diabetes] : no diabetes  [History of Iron Deficiency] : no history of iron deficiency [Anemia] : no anemia [Arthralgias] : no arthralgias [Nocturia] : no nocturia [Heartburn] : no heartburn [FreeTextEntry8] : clear rhinitis in a.m. after removes mask when supine only [Anxious] : not anxious [Depression] : no depression

## 2020-08-25 NOTE — ASSESSMENT
[FreeTextEntry1] : 74 year old ADRIANA BRIAN with HTN, HLD, DVT 2013, Thyroidectomy (CA), Prostate CA, Obesity; severe obstructive sleep apnea on CPAP therapy since 1999 presents for follow-up after receiving a replacement device.\par \par Severe BAUTISTA on CPAP 8 cm H2O with a nasal mask.\par \par - Benefit from CPAP noted:  resolved daytime fatigue, improved sleep quality.\par \par - Reviewed compliance and therapy data with patient\par - Compliant on 100% of nights averaging 5 hours 14 minutes\par - Residual AHI of 21.8 on pressure at 8 cm H2O.\par - Excessive mask leak (1h 2m average)\par .........Patient will secure mask tighter.\par .........Repeat data download in 2-3 weeks\par ......... if mask leak and residual apneas - may require mask fitting and or increase in pressure.\par  \par - Verus renewal of supplies is pending to continue therapy at 8 cm H2O.   \par \par He will follow up in one year or sooner if needed.\par

## 2020-09-24 RX ORDER — LISINOPRIL 10 MG/1
TABLET ORAL
Qty: 90 TABLET | Refills: 0 | Status: SHIPPED | OUTPATIENT
Start: 2020-09-24 | End: 2021-07-12

## 2020-11-13 ENCOUNTER — TELEPHONE (OUTPATIENT)
Dept: CARDIOLOGY | Facility: CLINIC | Age: 75
End: 2020-11-13

## 2020-11-13 NOTE — TELEPHONE ENCOUNTER
Patient is going to undergo an umbilical hernia repair. Requesting cardiac clearance. OK to clear?

## 2021-01-04 ENCOUNTER — APPOINTMENT (OUTPATIENT)
Dept: PREADMISSION TESTING | Facility: HOSPITAL | Age: 76
End: 2021-01-04

## 2021-01-06 ENCOUNTER — APPOINTMENT (OUTPATIENT)
Dept: MRI IMAGING | Facility: HOSPITAL | Age: 76
End: 2021-01-06

## 2021-01-06 ENCOUNTER — APPOINTMENT (OUTPATIENT)
Dept: PREADMISSION TESTING | Facility: HOSPITAL | Age: 76
End: 2021-01-06

## 2021-01-06 ENCOUNTER — APPOINTMENT (OUTPATIENT)
Dept: CT IMAGING | Facility: HOSPITAL | Age: 76
End: 2021-01-06

## 2021-01-06 ENCOUNTER — APPOINTMENT (OUTPATIENT)
Dept: CARDIOLOGY | Facility: HOSPITAL | Age: 76
End: 2021-01-06

## 2021-01-06 ENCOUNTER — APPOINTMENT (OUTPATIENT)
Dept: NEUROLOGY | Facility: HOSPITAL | Age: 76
End: 2021-01-06

## 2021-01-06 ENCOUNTER — HOSPITAL ENCOUNTER (OUTPATIENT)
Facility: HOSPITAL | Age: 76
Discharge: HOME | End: 2021-01-09
Attending: EMERGENCY MEDICINE | Admitting: INTERNAL MEDICINE

## 2021-01-06 DIAGNOSIS — Z86.79 HISTORY OF ATRIAL FIBRILLATION: ICD-10-CM

## 2021-01-06 DIAGNOSIS — R56.9 WITNESSED SEIZURE-LIKE ACTIVITY (HCC): Primary | ICD-10-CM

## 2021-01-06 DIAGNOSIS — Z79.01 ANTICOAGULATED: ICD-10-CM

## 2021-01-06 DIAGNOSIS — E87.1 HYPONATREMIA: ICD-10-CM

## 2021-01-06 DIAGNOSIS — R55 SYNCOPE, CONVULSIVE: ICD-10-CM

## 2021-01-06 LAB
ALBUMIN SERPL-MCNC: 4.4 G/DL (ref 3.5–5.2)
ALBUMIN/GLOB SERPL: 1.9 G/DL
ALP SERPL-CCNC: 88 U/L (ref 39–117)
ALT SERPL W P-5'-P-CCNC: 18 U/L (ref 1–41)
ANION GAP SERPL CALCULATED.3IONS-SCNC: 9 MMOL/L (ref 5–15)
AST SERPL-CCNC: 26 U/L (ref 1–40)
BASOPHILS # BLD AUTO: 0.08 10*3/MM3 (ref 0–0.2)
BASOPHILS NFR BLD AUTO: 1 % (ref 0–1.5)
BILIRUB SERPL-MCNC: 0.5 MG/DL (ref 0–1.2)
BUN SERPL-MCNC: 14 MG/DL (ref 8–23)
BUN/CREAT SERPL: 18.2 (ref 7–25)
CALCIUM SPEC-SCNC: 9.7 MG/DL (ref 8.6–10.5)
CHLORIDE SERPL-SCNC: 94 MMOL/L (ref 98–107)
CO2 SERPL-SCNC: 25 MMOL/L (ref 22–29)
CREAT SERPL-MCNC: 0.77 MG/DL (ref 0.76–1.27)
DEPRECATED RDW RBC AUTO: 44.1 FL (ref 37–54)
EOSINOPHIL # BLD AUTO: 0.25 10*3/MM3 (ref 0–0.4)
EOSINOPHIL NFR BLD AUTO: 3.3 % (ref 0.3–6.2)
ERYTHROCYTE [DISTWIDTH] IN BLOOD BY AUTOMATED COUNT: 14 % (ref 12.3–15.4)
GFR SERPL CREATININE-BSD FRML MDRD: 99 ML/MIN/1.73
GLOBULIN UR ELPH-MCNC: 2.3 GM/DL
GLUCOSE BLDC GLUCOMTR-MCNC: 109 MG/DL (ref 70–130)
GLUCOSE SERPL-MCNC: 118 MG/DL (ref 65–99)
HBA1C MFR BLD: 5.6 % (ref 4.8–5.6)
HCT VFR BLD AUTO: 46.7 % (ref 37.5–51)
HGB BLD-MCNC: 15 G/DL (ref 13–17.7)
HOLD SPECIMEN: NORMAL
HOLD SPECIMEN: NORMAL
IMM GRANULOCYTES # BLD AUTO: 0.03 10*3/MM3 (ref 0–0.05)
IMM GRANULOCYTES NFR BLD AUTO: 0.4 % (ref 0–0.5)
LYMPHOCYTES # BLD AUTO: 1.56 10*3/MM3 (ref 0.7–3.1)
LYMPHOCYTES NFR BLD AUTO: 20.3 % (ref 19.6–45.3)
MAGNESIUM SERPL-MCNC: 2 MG/DL (ref 1.6–2.4)
MCH RBC QN AUTO: 27.6 PG (ref 26.6–33)
MCHC RBC AUTO-ENTMCNC: 32.1 G/DL (ref 31.5–35.7)
MCV RBC AUTO: 85.8 FL (ref 79–97)
MONOCYTES # BLD AUTO: 0.37 10*3/MM3 (ref 0.1–0.9)
MONOCYTES NFR BLD AUTO: 4.8 % (ref 5–12)
NEUTROPHILS NFR BLD AUTO: 5.4 10*3/MM3 (ref 1.7–7)
NEUTROPHILS NFR BLD AUTO: 70.2 % (ref 42.7–76)
NRBC BLD AUTO-RTO: 0 /100 WBC (ref 0–0.2)
PLATELET # BLD AUTO: 284 10*3/MM3 (ref 140–450)
PMV BLD AUTO: 9.5 FL (ref 6–12)
POTASSIUM SERPL-SCNC: 4.3 MMOL/L (ref 3.5–5.2)
PROT SERPL-MCNC: 6.7 G/DL (ref 6–8.5)
QT INTERVAL: 416 MS
QTC INTERVAL: 449 MS
RBC # BLD AUTO: 5.44 10*6/MM3 (ref 4.14–5.8)
SODIUM SERPL-SCNC: 128 MMOL/L (ref 136–145)
TROPONIN T SERPL-MCNC: <0.01 NG/ML (ref 0–0.03)
WBC # BLD AUTO: 7.69 10*3/MM3 (ref 3.4–10.8)
WHOLE BLOOD HOLD SPECIMEN: NORMAL
WHOLE BLOOD HOLD SPECIMEN: NORMAL

## 2021-01-06 PROCEDURE — 83036 HEMOGLOBIN GLYCOSYLATED A1C: CPT | Performed by: PHYSICIAN ASSISTANT

## 2021-01-06 PROCEDURE — G0378 HOSPITAL OBSERVATION PER HR: HCPCS

## 2021-01-06 PROCEDURE — 99219 PR INITIAL OBSERVATION CARE/DAY 50 MINUTES: CPT | Performed by: PHYSICIAN ASSISTANT

## 2021-01-06 PROCEDURE — 0 GADOBENATE DIMEGLUMINE 529 MG/ML SOLUTION: Performed by: FAMILY MEDICINE

## 2021-01-06 PROCEDURE — 70553 MRI BRAIN STEM W/O & W/DYE: CPT

## 2021-01-06 PROCEDURE — 93005 ELECTROCARDIOGRAM TRACING: CPT | Performed by: EMERGENCY MEDICINE

## 2021-01-06 PROCEDURE — 95816 EEG AWAKE AND DROWSY: CPT

## 2021-01-06 PROCEDURE — 85025 COMPLETE CBC W/AUTO DIFF WBC: CPT | Performed by: EMERGENCY MEDICINE

## 2021-01-06 PROCEDURE — 70450 CT HEAD/BRAIN W/O DYE: CPT

## 2021-01-06 PROCEDURE — 84484 ASSAY OF TROPONIN QUANT: CPT | Performed by: EMERGENCY MEDICINE

## 2021-01-06 PROCEDURE — 82962 GLUCOSE BLOOD TEST: CPT

## 2021-01-06 PROCEDURE — 83735 ASSAY OF MAGNESIUM: CPT | Performed by: EMERGENCY MEDICINE

## 2021-01-06 PROCEDURE — 93005 ELECTROCARDIOGRAM TRACING: CPT

## 2021-01-06 PROCEDURE — A9577 INJ MULTIHANCE: HCPCS | Performed by: FAMILY MEDICINE

## 2021-01-06 PROCEDURE — 99284 EMERGENCY DEPT VISIT MOD MDM: CPT

## 2021-01-06 PROCEDURE — 99204 OFFICE O/P NEW MOD 45 MIN: CPT | Performed by: PSYCHIATRY & NEUROLOGY

## 2021-01-06 PROCEDURE — 80053 COMPREHEN METABOLIC PANEL: CPT | Performed by: EMERGENCY MEDICINE

## 2021-01-06 RX ORDER — FLECAINIDE ACETATE 150 MG/1
75 TABLET ORAL 2 TIMES DAILY
Status: DISCONTINUED | OUTPATIENT
Start: 2021-01-06 | End: 2021-01-09 | Stop reason: HOSPADM

## 2021-01-06 RX ORDER — ATORVASTATIN CALCIUM 20 MG/1
20 TABLET, FILM COATED ORAL NIGHTLY
Status: DISCONTINUED | OUTPATIENT
Start: 2021-01-06 | End: 2021-01-09 | Stop reason: HOSPADM

## 2021-01-06 RX ORDER — SODIUM CHLORIDE 0.9 % (FLUSH) 0.9 %
10 SYRINGE (ML) INJECTION AS NEEDED
Status: DISCONTINUED | OUTPATIENT
Start: 2021-01-06 | End: 2021-01-09 | Stop reason: HOSPADM

## 2021-01-06 RX ORDER — SODIUM CHLORIDE 0.9 % (FLUSH) 0.9 %
10 SYRINGE (ML) INJECTION EVERY 12 HOURS SCHEDULED
Status: DISCONTINUED | OUTPATIENT
Start: 2021-01-06 | End: 2021-01-09 | Stop reason: HOSPADM

## 2021-01-06 RX ORDER — LISINOPRIL 10 MG/1
10 TABLET ORAL DAILY
Status: DISCONTINUED | OUTPATIENT
Start: 2021-01-07 | End: 2021-01-09 | Stop reason: HOSPADM

## 2021-01-06 RX ADMIN — SODIUM CHLORIDE, PRESERVATIVE FREE 10 ML: 5 INJECTION INTRAVENOUS at 20:50

## 2021-01-06 RX ADMIN — FLECAINIDE ACETATE 75 MG: 150 TABLET ORAL at 21:18

## 2021-01-06 RX ADMIN — APIXABAN 5 MG: 5 TABLET, FILM COATED ORAL at 21:50

## 2021-01-06 RX ADMIN — GADOBENATE DIMEGLUMINE 14 ML: 529 INJECTION, SOLUTION INTRAVENOUS at 18:15
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
## 2021-01-06 NOTE — PLAN OF CARE
VS stable. No complaints of dizziness noted. MRI completed awaiting results. Plan for carotid duplex. Will continue to monitor  Problem: Adult Inpatient Plan of Care  Goal: Plan of Care Review  Outcome: Ongoing, Progressing
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
## 2021-01-07 ENCOUNTER — APPOINTMENT (OUTPATIENT)
Dept: CARDIOLOGY | Facility: HOSPITAL | Age: 76
End: 2021-01-07

## 2021-01-07 PROBLEM — R05.8 SPUTUM PRODUCTION: Status: RESOLVED | Noted: 2018-08-21 | Resolved: 2021-01-07

## 2021-01-07 PROBLEM — R07.89 ATYPICAL CHEST PAIN: Status: RESOLVED | Noted: 2017-01-24 | Resolved: 2021-01-07

## 2021-01-07 PROBLEM — I70.0 AORTIC ATHEROSCLEROSIS: Status: ACTIVE | Noted: 2021-01-07

## 2021-01-07 LAB
ASCENDING AORTA: 3.1 CM
BH CV ECHO MEAS - AO MAX PG (FULL): 4 MMHG
BH CV ECHO MEAS - AO MAX PG: 10.4 MMHG
BH CV ECHO MEAS - AO MEAN PG (FULL): 4 MMHG
BH CV ECHO MEAS - AO MEAN PG: 6 MMHG
BH CV ECHO MEAS - AO ROOT AREA (BSA CORRECTED): 1.7
BH CV ECHO MEAS - AO ROOT AREA: 8 CM^2
BH CV ECHO MEAS - AO ROOT DIAM: 3.2 CM
BH CV ECHO MEAS - AO V2 MAX: 161 CM/SEC
BH CV ECHO MEAS - AO V2 MEAN: 112 CM/SEC
BH CV ECHO MEAS - AO V2 VTI: 33.3 CM
BH CV ECHO MEAS - ASC AORTA: 3.1 CM
BH CV ECHO MEAS - AVA(I,A): 1.7 CM^2
BH CV ECHO MEAS - AVA(I,D): 1.7 CM^2
BH CV ECHO MEAS - AVA(V,A): 1.9 CM^2
BH CV ECHO MEAS - AVA(V,D): 1.9 CM^2
BH CV ECHO MEAS - BSA(HAYCOCK): 1.9 M^2
BH CV ECHO MEAS - BSA: 1.8 M^2
BH CV ECHO MEAS - BZI_BMI: 24 KILOGRAMS/M^2
BH CV ECHO MEAS - BZI_METRIC_HEIGHT: 172.7 CM
BH CV ECHO MEAS - BZI_METRIC_WEIGHT: 71.7 KG
BH CV ECHO MEAS - EDV(CUBED): 69.9 ML
BH CV ECHO MEAS - EDV(MOD-SP2): 63 ML
BH CV ECHO MEAS - EDV(MOD-SP4): 60 ML
BH CV ECHO MEAS - EDV(TEICH): 75.1 ML
BH CV ECHO MEAS - EF(CUBED): 74.3 %
BH CV ECHO MEAS - EF(MOD-BP): 68 %
BH CV ECHO MEAS - EF(MOD-SP2): 66.7 %
BH CV ECHO MEAS - EF(MOD-SP4): 70 %
BH CV ECHO MEAS - EF(TEICH): 66.6 %
BH CV ECHO MEAS - ESV(CUBED): 18 ML
BH CV ECHO MEAS - ESV(MOD-SP2): 21 ML
BH CV ECHO MEAS - ESV(MOD-SP4): 18 ML
BH CV ECHO MEAS - ESV(TEICH): 25.1 ML
BH CV ECHO MEAS - FS: 36.4 %
BH CV ECHO MEAS - IVS/LVPW: 1
BH CV ECHO MEAS - IVSD: 1.3 CM
BH CV ECHO MEAS - LA DIMENSION: 3.9 CM
BH CV ECHO MEAS - LA/AO: 1.2
BH CV ECHO MEAS - LAD MAJOR: 6.3 CM
BH CV ECHO MEAS - LAT PEAK E' VEL: 8.4 CM/SEC
BH CV ECHO MEAS - LATERAL E/E' RATIO: 8.2
BH CV ECHO MEAS - LV DIASTOLIC VOL/BSA (35-75): 32.4 ML/M^2
BH CV ECHO MEAS - LV MASS(C)D: 188.2 GRAMS
BH CV ECHO MEAS - LV MASS(C)DI: 101.8 GRAMS/M^2
BH CV ECHO MEAS - LV MAX PG: 6.4 MMHG
BH CV ECHO MEAS - LV MEAN PG: 2 MMHG
BH CV ECHO MEAS - LV SYSTOLIC VOL/BSA (12-30): 9.7 ML/M^2
BH CV ECHO MEAS - LV V1 MAX: 126 CM/SEC
BH CV ECHO MEAS - LV V1 MEAN: 68.2 CM/SEC
BH CV ECHO MEAS - LV V1 VTI: 23 CM
BH CV ECHO MEAS - LVIDD: 4.1 CM
BH CV ECHO MEAS - LVIDS: 2.6 CM
BH CV ECHO MEAS - LVLD AP2: 7 CM
BH CV ECHO MEAS - LVLD AP4: 6.9 CM
BH CV ECHO MEAS - LVLS AP2: 6 CM
BH CV ECHO MEAS - LVLS AP4: 6.6 CM
BH CV ECHO MEAS - LVOT AREA (M): 2.5 CM^2
BH CV ECHO MEAS - LVOT AREA: 2.4 CM^2
BH CV ECHO MEAS - LVOT DIAM: 1.8 CM
BH CV ECHO MEAS - LVPWD: 1.3 CM
BH CV ECHO MEAS - MED PEAK E' VEL: 6.8 CM/SEC
BH CV ECHO MEAS - MEDIAL E/E' RATIO: 10.2
BH CV ECHO MEAS - MV A MAX VEL: 44.4 CM/SEC
BH CV ECHO MEAS - MV DEC SLOPE: 466 CM/SEC^2
BH CV ECHO MEAS - MV DEC TIME: 0.19 SEC
BH CV ECHO MEAS - MV E MAX VEL: 69.1 CM/SEC
BH CV ECHO MEAS - MV E/A: 1.6
BH CV ECHO MEAS - MV P1/2T MAX VEL: 105 CM/SEC
BH CV ECHO MEAS - MV P1/2T: 66 MSEC
BH CV ECHO MEAS - MVA P1/2T LCG: 2.1 CM^2
BH CV ECHO MEAS - MVA(P1/2T): 3.3 CM^2
BH CV ECHO MEAS - PA ACC SLOPE: 700.5 CM/SEC^2
BH CV ECHO MEAS - PA ACC TIME: 0.11 SEC
BH CV ECHO MEAS - PA PR(ACCEL): 27.5 MMHG
BH CV ECHO MEAS - SI(AO): 144.8 ML/M^2
BH CV ECHO MEAS - SI(CUBED): 28.1 ML/M^2
BH CV ECHO MEAS - SI(LVOT): 29.9 ML/M^2
BH CV ECHO MEAS - SI(MOD-SP2): 22.7 ML/M^2
BH CV ECHO MEAS - SI(MOD-SP4): 22.7 ML/M^2
BH CV ECHO MEAS - SI(TEICH): 27 ML/M^2
BH CV ECHO MEAS - SV(AO): 267.8 ML
BH CV ECHO MEAS - SV(CUBED): 51.9 ML
BH CV ECHO MEAS - SV(LVOT): 55.3 ML
BH CV ECHO MEAS - SV(MOD-SP2): 42 ML
BH CV ECHO MEAS - SV(MOD-SP4): 42 ML
BH CV ECHO MEAS - SV(TEICH): 50 ML
BH CV ECHO MEAS - TAPSE (>1.6): 3.3 CM
BH CV ECHO MEAS - TR MAX PG: 27 MMHG
BH CV ECHO MEAS - TR MAX VEL: 259.5 CM/SEC
BH CV ECHO MEASUREMENTS AVERAGE E/E' RATIO: 9.09
BH CV STRESS BP STAGE 1: NORMAL
BH CV STRESS BP STAGE 2: NORMAL
BH CV STRESS BP STAGE 4: NORMAL
BH CV STRESS COMMENTS STAGE 1: NORMAL
BH CV STRESS DOSE REGADENOSON STAGE 1: 0.4
BH CV STRESS DURATION MIN STAGE 1: 1
BH CV STRESS DURATION MIN STAGE 2: 1
BH CV STRESS DURATION MIN STAGE 3: 1
BH CV STRESS DURATION MIN STAGE 4: 1
BH CV STRESS DURATION SEC STAGE 1: 0
BH CV STRESS DURATION SEC STAGE 2: 0
BH CV STRESS DURATION SEC STAGE 3: 0
BH CV STRESS DURATION SEC STAGE 4: 0
BH CV STRESS HR STAGE 1: 81
BH CV STRESS HR STAGE 2: 83
BH CV STRESS HR STAGE 3: 82
BH CV STRESS HR STAGE 4: 74
BH CV STRESS O2 STAGE 1: 96
BH CV STRESS O2 STAGE 2: 98
BH CV STRESS O2 STAGE 3: 96
BH CV STRESS O2 STAGE 4: 96
BH CV STRESS PROTOCOL 1: NORMAL
BH CV STRESS RECOVERY BP: NORMAL MMHG
BH CV STRESS RECOVERY HR: 71 BPM
BH CV STRESS RECOVERY O2: 94 %
BH CV STRESS STAGE 1: 1
BH CV STRESS STAGE 2: 2
BH CV STRESS STAGE 3: 3
BH CV STRESS STAGE 4: 4
BH CV VAS BP RIGHT ARM: NORMAL MMHG
BH CV XLRA - RV BASE: 3 CM
BH CV XLRA - RV LENGTH: 6.2 CM
BH CV XLRA - RV MID: 2.6 CM
BH CV XLRA - TDI S': 17.5 CM/SEC
BH CV XLRA MEAS LEFT DIST CCA EDV: 16.5 CM/SEC
BH CV XLRA MEAS LEFT DIST CCA PSV: 93.5 CM/SEC
BH CV XLRA MEAS LEFT DIST ICA EDV: 19.3 CM/SEC
BH CV XLRA MEAS LEFT DIST ICA PSV: 67.3 CM/SEC
BH CV XLRA MEAS LEFT ICA/CCA RATIO: 0.7
BH CV XLRA MEAS LEFT MID CCA EDV: 14.9 CM/SEC
BH CV XLRA MEAS LEFT MID CCA PSV: 87.2 CM/SEC
BH CV XLRA MEAS LEFT MID ICA EDV: 20.4 CM/SEC
BH CV XLRA MEAS LEFT MID ICA PSV: 68.4 CM/SEC
BH CV XLRA MEAS LEFT PROX CCA EDV: 15.7 CM/SEC
BH CV XLRA MEAS LEFT PROX CCA PSV: 105 CM/SEC
BH CV XLRA MEAS LEFT PROX ECA EDV: 13.4 CM/SEC
BH CV XLRA MEAS LEFT PROX ECA PSV: 143 CM/SEC
BH CV XLRA MEAS LEFT PROX ICA EDV: 20.4 CM/SEC
BH CV XLRA MEAS LEFT PROX ICA PSV: 62.3 CM/SEC
BH CV XLRA MEAS LEFT PROX SCLA EDV: 0.79 CM/SEC
BH CV XLRA MEAS LEFT PROX SCLA PSV: 137 CM/SEC
BH CV XLRA MEAS LEFT VERTEBRAL A EDV: 12.1 CM/SEC
BH CV XLRA MEAS LEFT VERTEBRAL A PSV: 40.4 CM/SEC
BH CV XLRA MEAS RIGHT DIST CCA EDV: 16.8 CM/SEC
BH CV XLRA MEAS RIGHT DIST CCA PSV: 94.3 CM/SEC
BH CV XLRA MEAS RIGHT DIST ICA EDV: 28 CM/SEC
BH CV XLRA MEAS RIGHT DIST ICA PSV: 101 CM/SEC
BH CV XLRA MEAS RIGHT ICA/CCA RATIO: 0.7
BH CV XLRA MEAS RIGHT MID CCA EDV: 18 CM/SEC
BH CV XLRA MEAS RIGHT MID CCA PSV: 109 CM/SEC
BH CV XLRA MEAS RIGHT MID ICA EDV: 20.1 CM/SEC
BH CV XLRA MEAS RIGHT MID ICA PSV: 70.2 CM/SEC
BH CV XLRA MEAS RIGHT PROX CCA EDV: 14.6 CM/SEC
BH CV XLRA MEAS RIGHT PROX CCA PSV: 108 CM/SEC
BH CV XLRA MEAS RIGHT PROX ECA EDV: 8.4 CM/SEC
BH CV XLRA MEAS RIGHT PROX ECA PSV: 123 CM/SEC
BH CV XLRA MEAS RIGHT PROX ICA EDV: 20.1 CM/SEC
BH CV XLRA MEAS RIGHT PROX ICA PSV: 65.8 CM/SEC
BH CV XLRA MEAS RIGHT PROX SCLA EDV: 2.2 CM/SEC
BH CV XLRA MEAS RIGHT PROX SCLA PSV: 204 CM/SEC
BH CV XLRA MEAS RIGHT VERTEBRAL A EDV: 5.8 CM/SEC
BH CV XLRA MEAS RIGHT VERTEBRAL A PSV: 40.9 CM/SEC
LEFT ATRIUM VOLUME INDEX: 34.6 ML/M^2
LEFT ATRIUM VOLUME: 64 ML
LV EF 2D ECHO EST: 70 %
LV EF NUC BP: 68 %
MAXIMAL PREDICTED HEART RATE: 146 BPM
MAXIMAL PREDICTED HEART RATE: 146 BPM
PERCENT MAX PREDICTED HR: 58.9 %
RIGHT ARM BP: NORMAL MMHG
SARS-COV-2 RDRP RESP QL NAA+PROBE: NORMAL
SODIUM SERPL-SCNC: 137 MMOL/L (ref 136–145)
STRESS BASELINE BP: NORMAL MMHG
STRESS BASELINE HR: 65 BPM
STRESS O2 SAT REST: 96 %
STRESS PERCENT HR: 69 %
STRESS POST O2 SAT PEAK: 96 %
STRESS POST PEAK BP: NORMAL MMHG
STRESS POST PEAK HR: 86 BPM
STRESS TARGET HR: 124 BPM
STRESS TARGET HR: 124 BPM
TROPONIN T SERPL-MCNC: <0.01 NG/ML (ref 0–0.03)

## 2021-01-07 PROCEDURE — G0378 HOSPITAL OBSERVATION PER HR: HCPCS

## 2021-01-07 PROCEDURE — 99226 PR SBSQ OBSERVATION CARE/DAY 35 MINUTES: CPT | Performed by: INTERNAL MEDICINE

## 2021-01-07 PROCEDURE — 93306 TTE W/DOPPLER COMPLETE: CPT

## 2021-01-07 PROCEDURE — 93306 TTE W/DOPPLER COMPLETE: CPT | Performed by: INTERNAL MEDICINE

## 2021-01-07 PROCEDURE — 84484 ASSAY OF TROPONIN QUANT: CPT | Performed by: NURSE PRACTITIONER

## 2021-01-07 PROCEDURE — 78491 MYOCRD IMG PET 1STD RST/STRS: CPT | Performed by: INTERNAL MEDICINE

## 2021-01-07 PROCEDURE — 93880 EXTRACRANIAL BILAT STUDY: CPT

## 2021-01-07 PROCEDURE — 93018 CV STRESS TEST I&R ONLY: CPT | Performed by: INTERNAL MEDICINE

## 2021-01-07 PROCEDURE — 93880 EXTRACRANIAL BILAT STUDY: CPT | Performed by: INTERNAL MEDICINE

## 2021-01-07 PROCEDURE — A9555 RB82 RUBIDIUM: HCPCS | Performed by: NURSE PRACTITIONER

## 2021-01-07 PROCEDURE — 87635 SARS-COV-2 COVID-19 AMP PRB: CPT | Performed by: INTERNAL MEDICINE

## 2021-01-07 PROCEDURE — 99214 OFFICE O/P EST MOD 30 MIN: CPT | Performed by: INTERNAL MEDICINE

## 2021-01-07 PROCEDURE — 99213 OFFICE O/P EST LOW 20 MIN: CPT | Performed by: PSYCHIATRY & NEUROLOGY

## 2021-01-07 PROCEDURE — 84295 ASSAY OF SERUM SODIUM: CPT | Performed by: INTERNAL MEDICINE

## 2021-01-07 PROCEDURE — 93017 CV STRESS TEST TRACING ONLY: CPT

## 2021-01-07 PROCEDURE — 78491 MYOCRD IMG PET 1STD RST/STRS: CPT

## 2021-01-07 PROCEDURE — 0 RUBIDIUM CHLORIDE: Performed by: NURSE PRACTITIONER

## 2021-01-07 PROCEDURE — 25010000002 REGADENOSON 0.4 MG/5ML SOLUTION: Performed by: NURSE PRACTITIONER

## 2021-01-07 RX ORDER — ACETAMINOPHEN 325 MG/1
650 TABLET ORAL EVERY 6 HOURS PRN
Status: DISCONTINUED | OUTPATIENT
Start: 2021-01-07 | End: 2021-01-09 | Stop reason: HOSPADM

## 2021-01-07 RX ADMIN — SODIUM CHLORIDE, PRESERVATIVE FREE 10 ML: 5 INJECTION INTRAVENOUS at 08:50

## 2021-01-07 RX ADMIN — ATORVASTATIN CALCIUM 20 MG: 20 TABLET, FILM COATED ORAL at 09:28

## 2021-01-07 RX ADMIN — SODIUM CHLORIDE, PRESERVATIVE FREE 10 ML: 5 INJECTION INTRAVENOUS at 21:23

## 2021-01-07 RX ADMIN — LISINOPRIL 10 MG: 10 TABLET ORAL at 08:50

## 2021-01-07 RX ADMIN — REGADENOSON 0.4 MG: 0.08 INJECTION, SOLUTION INTRAVENOUS at 11:48

## 2021-01-07 RX ADMIN — APIXABAN 5 MG: 5 TABLET, FILM COATED ORAL at 20:42

## 2021-01-07 RX ADMIN — APIXABAN 5 MG: 5 TABLET, FILM COATED ORAL at 08:50

## 2021-01-07 RX ADMIN — RUBIDIUM CHLORIDE RB-82 1 DOSE: 150 INJECTION, SOLUTION INTRAVENOUS at 11:36

## 2021-01-07 RX ADMIN — FLECAINIDE ACETATE 75 MG: 150 TABLET ORAL at 20:42

## 2021-01-07 RX ADMIN — ACETAMINOPHEN 650 MG: 325 TABLET, FILM COATED ORAL at 13:29

## 2021-01-07 RX ADMIN — RUBIDIUM CHLORIDE RB-82 1 DOSE: 150 INJECTION, SOLUTION INTRAVENOUS at 11:50

## 2021-01-07 RX ADMIN — FLECAINIDE ACETATE 75 MG: 150 TABLET ORAL at 08:49
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
## 2021-01-07 NOTE — PLAN OF CARE
VS stable. Pt completed a carotid duplex, ECHO, and stress test today. Pt has had no episodes of dizziness or syncope. Plan for an EP study tomorrow. Will continue to monitor.  Problem: Adult Inpatient Plan of Care  Goal: Plan of Care Review  Outcome: Ongoing, Progressing

## 2021-01-07 NOTE — PROGRESS NOTES
Discharge Planning Assessment  [facility]     Patient Name: Jovon Gracia  MRN: [id number]  Today's Date: 1/7/2021    Admit Date: 1/6/2021    Discharge Needs Assessment     Row Name 01/07/21 0944       Living Environment    Lives With  spouse    Current Living Arrangements  home/apartment/condo    Living Arrangement Comments  Supportive spouse.       Discharge Needs Assessment    Equipment Currently Used at Home  none    Equipment Needed After Discharge  none    Discharge Coordination/Progress  Denies current use of HH or outpt services.        Discharge Plan     Row Name 01/07/21 0945       Plan    Plan  Home at DC    Patient/Family in Agreement with Plan  yes    Plan Comments  I spoke with the pt. He denies DC needs at this time.    Final Discharge Disposition Code  01 - home or self-care    Row Name 01/07/21 0829       Plan    Final Discharge Disposition Code  01 - home or self-care        Continued Care and Services - Admitted Since 1/6/2021    Coordination has not been started for this encounter.       Expected Discharge Date and Time     Expected Discharge Date Expected Discharge Time    Jan 8, 2021         Demographic Summary    No documentation.       Functional Status     Row Name 01/07/21 0943       Functional Status    Usual Activity Tolerance  good       Functional Status, IADL    Medications  independent    Meal Preparation  independent    Housekeeping  independent    Laundry  independent    Shopping  independent        Psychosocial    No documentation.       Abuse/Neglect    No documentation.       Legal    No documentation.       Substance Abuse    No documentation.       Patient Forms    No documentation.           Drea Capellan RN

## 2021-01-07 NOTE — PROGRESS NOTES
[facility] Medicine Services  PROGRESS NOTE    Patient Name: Jovon Gracia  : 1945  MRN: [id number]    Date of Admission: 2021  Primary Care Physician: Cheri Monaco MD    Subjective   Subjective     CC:  Follow-up syncope    HPI:  No complaints today.  No further episodes since arrival.  No chest pain or palpitations.  Reports that neurology already saw him and did not think he was having a seizure.    ROS:  Gen- No fevers, chills  CV- No chest pain, palpitations  Resp- No cough, dyspnea  GI- No N/V/D, abd pain    Objective   Objective     Vital Signs:   Temp:  [97.7 °F (36.5 °C)-99.2 °F (37.3 °C)] 99.2 °F (37.3 °C)  Heart Rate:  [51-77] 75  Resp:  [16-18] 18  BP: (105-158)/(55-95) 158/83        Physical Exam:  Constitutional: No acute distress, awake, alert, ambulating room  HENT: NCAT, mucous membranes moist  Respiratory: Clear to auscultation bilaterally, respiratory effort normal   Cardiovascular: RRR, no murmurs, rubs, or gallops, palpable radial pulses bilaterally  Gastrointestinal: Positive bowel sounds, soft, nontender, nondistended  Musculoskeletal: No bilateral ankle edema  Psychiatric: Appropriate affect, cooperative  Neurologic: Speech clear, strength symmetric in all extremities    Results Reviewed:  Results from last 7 days   Lab Units 21  1324   WBC 10*3/mm3 7.69   HEMOGLOBIN g/dL 15.0   HEMATOCRIT % 46.7   PLATELETS 10*3/mm3 284     Results from last 7 days   Lab Units 21  0933 21  1324   SODIUM mmol/L 137 128*   POTASSIUM mmol/L  --  4.3   CHLORIDE mmol/L  --  94*   CO2 mmol/L  --  25.0   BUN mg/dL  --  14   CREATININE mg/dL  --  0.77   GLUCOSE mg/dL  --  118*   CALCIUM mg/dL  --  9.7   ALT (SGPT) U/L  --  18   AST (SGOT) U/L  --  26   TROPONIN T ng/mL <0.010 <0.010     Estimated Creatinine Clearance: 82.2 mL/min (by C-G formula based on SCr of 0.77 mg/dL).    Microbiology Results Abnormal     Procedure Component Value - Date/Time     COVID PRE-OP / PRE-PROCEDURE SCREENING ORDER (NO ISOLATION) - Swab, Nasopharynx [[id number]]  (Normal) Collected: 01/07/21 0955    Lab Status: Final result Specimen: Swab from Nasopharynx Updated: 01/07/21 1020    Narrative:      The following orders were created for panel order COVID PRE-OP / PRE-PROCEDURE SCREENING ORDER (NO ISOLATION) - Swab, Nasopharynx.  Procedure                               Abnormality         Status                     ---------                               -----------         ------                     COVID-19, ABBOTT IN-HOUS...[[id number]]  Normal              Final result                 Please view results for these tests on the individual orders.    COVID-19, ABBOTT IN-HOUSE,NASAL Swab (NO TRANSPORT MEDIA) 2 HR TAT - Swab, Nasopharynx [[id number]]  (Normal) Collected: 01/07/21 0955    Lab Status: Final result Specimen: Swab from Nasopharynx Updated: 01/07/21 1020     COVID19 Presumptive Negative    Narrative:      Fact sheet for providers: https://www.fda.gov/media/263516/download     Fact sheet for patients: https://www.fda.gov/media/789661/download    Test performed by PCR.  If inconsistent with clinical signs and symptoms patient should be tested with different authorized molecular test.          Imaging Results (Last 24 Hours)     Procedure Component Value Units Date/Time    MRI Brain With & Without Contrast [[id number]] Collected: 01/06/21 1805     Updated: 01/07/21 1601    Narrative:      EXAMINATION: MRI BRAIN WWO CONTRAST - 01/06/2021     INDICATION: R56.9-Unspecified convulsions; E87.1-Tirh-yjcfnjjikv and  hyponatremia; Z79.01-Long term (current) use of anticoagulants;  Z86.79-Personal history of other diseases of the circulatory system.  Onset seizure, history of atrial fibrillation.     TECHNIQUE: Routine multilevel imaging is obtained of the brain with and  without the administration of gadolinium contrast.     COMPARISON: None.     FINDINGS: There is no evidence of  restricted diffusion to suggest  evidence of an acute ischemic insult. Flow voids are preserved in the  major intracranial vessels. There is atrophy identified of the brain. No  evidence of hemorrhage. No hydrocephalus. There are signal changes seen  scattered throughout the periventricular and subcortical white matter  suggesting chronic small vessel ischemic change. There is no abnormal  extra-axial fluid collection identified. The visualized paranasal  sinuses are clear. Globes and orbits are intact. The mastoid air cells  are patent. Pituitary and sella are unremarkable. Craniovertebral  junction is preserved. There is no evidence of mesial temporal  sclerosis. No evidence of cortical dysplasia. No abnormal contrast  enhancement to suggest evidence of an underlying mass or lesion. No  vascular malformation identified. The visualized vessels are grossly  unremarkable.       Impression:      Atrophy and chronic changes seen within the brain. No acute  intracranial abnormality. No abnormal contrast enhancement.     DICTATED:   01/06/2021  EDITED/ls :   01/06/2021                  This report was finalized on 1/7/2021 3:58 PM by Dr. María Nettles MD.       CT Head Without Contrast [[id number]] Collected: 01/06/21 1427     Updated: 01/07/21 1600    Narrative:      EXAMINATION: CT HEAD WO CONTRAST- 01/06/2021     INDICATION: Syncope, simple, normal neuro exam, dizziness     TECHNIQUE: Multiple axial CT imaging was obtained of the head from skull  base to skull vertex without the administration of intravenous contrast.     The radiation dose reduction device was turned on for each scan per the  ALARA (As Low as Reasonably Achievable) protocol.     COMPARISON: NONE     FINDINGS: Brain parenchyma is unremarkable. No hemorrhage or  hydrocephalus. No mass, mass effect or midline shift. There is a small  scalp hematoma overlying the right occipital bone. There is no  hemorrhage or hydrocephalus. No mass, mass effect or  midline shift. No  abnormal extra-axial fluid collections identified. The bony structures  reveal no evidence of osseous abnormality. Visualized paranasal sinuses  are clear.        Impression:      Scalp hematoma overlying the right occipital bone with no  acute intracranial abnormality.     D:  01/06/2021  E:  01/06/2021     This report was finalized on 1/7/2021 3:57 PM by Dr. María Nettles MD.             Results for orders placed during the hospital encounter of 01/06/21   Adult Transthoracic Echo Complete W/ Cont if Necessary Per Protocol    Addendum · Left ventricular systolic function is normal. Estimated left ventricular  EF = 70% · Aortic valve calcification noted without significant stenosis or  regurgitation. · Saline test results are negative.        Quinten Dial IV, MD 1/7/2021  1:29 PM          Narrative · Left ventricular ejection fraction appears to be greater than 70%.  · Left ventricular diastolic function was normal.  · Aortic valve calcification noted without significant stenosis or   regurgitation.  · Saline test results are negative.          I have reviewed the medications:  Scheduled Meds:apixaban, 5 mg, Oral, Q12H  atorvastatin, 20 mg, Oral, Nightly  flecainide, 75 mg, Oral, BID  lisinopril, 10 mg, Oral, Daily  sodium chloride, 10 mL, Intravenous, Q12H      Continuous Infusions:   PRN Meds:.•  acetaminophen  •  sodium chloride  •  sodium chloride    Assessment/Plan   Assessment & Plan     Active Hospital Problems    Diagnosis  POA   • **Syncope, convulsive [R55]  Yes   • Aortic atherosclerosis (CMS/HCC) [I70.0]  Yes   • Hyperlipidemia LDL goal <100 [E78.5]  Yes   • Essential hypertension [I10]  Yes   • Paroxysmal atrial fibrillation (CMS/HCC) [I48.0]  Yes      Resolved Hospital Problems   No resolved problems to display.        Brief Hospital Course to date:  Jovon Gracia is a 75 y.o. male with atrial fibrillation on Eliquis, HTN, HLD who had a syncopal episode while  driving with some associated jerking motion admitted to the hospital for further evaluation with unremarkable neurologic work-up and being further evaluated by cardiology    The following problems new to me today    Assessment/plan    Syncope with convulsions  -EEG unremarkable, MRI without acute process  -Cardiac work-up ongoing; stress testing low risk/negative; Echo grossly unremarkable; cardiology following and this afternoon has recommended EP study in the morning with possible device pending findings    Hyponatremia  -Patient reports drinking numerous bottles of water throughout the day, polydipsia is one possibility; in light of recent syncope SIADH could be an additional factor; add on repeat sodium this morning and if still abnormal will pursue hyponatremia work-up    Hypertension  Dyslipidemia  -Continue lisinopril and Lipitor    Atrial fibrillation  -Continue home Eliquis, flecainide    AAA  -PCP follow-up surveillance ultrasound    DVT Prophylaxis: Eliquis      Disposition: I expect the patient to be discharged TBD pending EP study tomorrow, likely 1-2 days.    CODE STATUS:   Code Status and Medical Interventions:   Ordered at: 01/06/21 1821     Level Of Support Discussed With:    Patient     Code Status:    CPR     Medical Interventions (Level of Support Prior to Arrest):    Full       Gilmer Block, DO  01/07/21

## 2021-01-07 NOTE — PROGRESS NOTES
"Neurology       Patient Care Team:  Cheri Monaco MD as PCP - General  Cate Slater MD as Consulting Physician (Hand Surgery)  Quinten Dial IV, MD as Cardiologist (Cardiology)    Chief complaint brief spell of unresponsiveness      Subjective .     History:    No further episodes  Brain MRI showing mild age-related atrophy but no other significant findings  EEG normal  Cardiology is following  Objective     Vital Signs   Blood pressure 131/69, pulse 60, temperature 97.9 °F (36.6 °C), temperature source Oral, resp. rate 16, height 172.7 cm (68\"), weight 71.7 kg (158 lb 1.1 oz), SpO2 96 %.    Physical Exam:  Patient is awake and alert.  He is sitting next to his wife on the couch.  He is oriented to person place and time.  His gait is normal.  He has no focal weakness of his arms or legs.  Pupils are equal and reactive and he tracks me around the room.  He has no ataxia on exam.    Results Review:  Brain MRI personally reviewed.  There is no evidence of acute stroke, there is some mild global age-related atrophy.    EEG-normal study        Assessment/Plan     Convulsive syncope    EEG and MRI are reassuring.  Carotid ultrasound, 2D echo, and stress test results are pending.  Cardiology is following and managing the status.    I have asked the patient not to drive for at least 30 days while he is undergoing work-up.  His event was not a seizure and so I do not think he needs a 90-day restriction.  Neurology will sign off.  He will follow closely with cardiology.    We had a discussion about his upcoming surgery, and let them know that I talked to Dr. Staples and he will be rescheduling the surgery with the patient.    I discussed the patients findings and my recommendations with patient, wife, primary team  Belen Alvarado MD  01/07/21  12:30 EST    "

## 2021-01-07 NOTE — PLAN OF CARE
Goal Outcome Evaluation:  Plan of Care Reviewed With: patient     Outcome Summary: Pt monitored closely all shift.. no sizure activity noted. he slept very well with no complaints.He ahs remained NPO in case Cardiology wants to work him up for syncopal episode..

## 2021-01-08 LAB
CHOLEST SERPL-MCNC: 142 MG/DL (ref 0–200)
HDLC SERPL-MCNC: 53 MG/DL (ref 40–60)
LDLC SERPL CALC-MCNC: 74 MG/DL (ref 0–100)
LDLC/HDLC SERPL: 1.39 {RATIO}
OSMOLALITY SERPL: 294 MOSM/KG (ref 275–295)
TRIGL SERPL-MCNC: 76 MG/DL (ref 0–150)
VLDLC SERPL-MCNC: 15 MG/DL (ref 5–40)

## 2021-01-08 PROCEDURE — 80061 LIPID PANEL: CPT | Performed by: NURSE PRACTITIONER

## 2021-01-08 PROCEDURE — C1786 PMKR, SINGLE, RATE-RESP: HCPCS | Performed by: INTERNAL MEDICINE

## 2021-01-08 PROCEDURE — 99153 MOD SED SAME PHYS/QHP EA: CPT | Performed by: INTERNAL MEDICINE

## 2021-01-08 PROCEDURE — 25010000002 HEPARIN (PORCINE) PER 1000 UNITS: Performed by: INTERNAL MEDICINE

## 2021-01-08 PROCEDURE — 99214 OFFICE O/P EST MOD 30 MIN: CPT | Performed by: NURSE PRACTITIONER

## 2021-01-08 PROCEDURE — 25010000002 FENTANYL CITRATE (PF) 100 MCG/2ML SOLUTION: Performed by: INTERNAL MEDICINE

## 2021-01-08 PROCEDURE — 33274 TCAT INSJ/RPL PERM LDLS PM: CPT | Performed by: INTERNAL MEDICINE

## 2021-01-08 PROCEDURE — G0378 HOSPITAL OBSERVATION PER HR: HCPCS

## 2021-01-08 PROCEDURE — 83930 ASSAY OF BLOOD OSMOLALITY: CPT | Performed by: NURSE PRACTITIONER

## 2021-01-08 PROCEDURE — 25010000003 LIDOCAINE 1 % SOLUTION: Performed by: INTERNAL MEDICINE

## 2021-01-08 PROCEDURE — 93619 COMPREHENSIVE EP EVALUATION: CPT | Performed by: INTERNAL MEDICINE

## 2021-01-08 PROCEDURE — C1894 INTRO/SHEATH, NON-LASER: HCPCS | Performed by: INTERNAL MEDICINE

## 2021-01-08 PROCEDURE — 99152 MOD SED SAME PHYS/QHP 5/>YRS: CPT | Performed by: INTERNAL MEDICINE

## 2021-01-08 PROCEDURE — 93005 ELECTROCARDIOGRAM TRACING: CPT | Performed by: INTERNAL MEDICINE

## 2021-01-08 PROCEDURE — 25010000003 CEFAZOLIN IN DEXTROSE 2-4 GM/100ML-% SOLUTION: Performed by: INTERNAL MEDICINE

## 2021-01-08 PROCEDURE — 93620 COMP EP EVL R AT VEN PAC&REC: CPT | Performed by: INTERNAL MEDICINE

## 2021-01-08 PROCEDURE — 25010000002 ONDANSETRON PER 1 MG: Performed by: INTERNAL MEDICINE

## 2021-01-08 PROCEDURE — 99225 PR SBSQ OBSERVATION CARE/DAY 25 MINUTES: CPT | Performed by: NURSE PRACTITIONER

## 2021-01-08 PROCEDURE — C1730 CATH, EP, 19 OR FEW ELECT: HCPCS | Performed by: INTERNAL MEDICINE

## 2021-01-08 PROCEDURE — 0 IOPAMIDOL PER 1 ML: Performed by: INTERNAL MEDICINE

## 2021-01-08 PROCEDURE — 25010000002 MIDAZOLAM PER 1 MG: Performed by: INTERNAL MEDICINE

## 2021-01-08 DEVICE — GEN PM TPS LEADLESS MICRA/AV VVIR RR 1.75G 0.8CC 18MM: Type: IMPLANTABLE DEVICE | Status: FUNCTIONAL

## 2021-01-08 RX ORDER — SODIUM CHLORIDE 9 MG/ML
100 INJECTION, SOLUTION INTRAVENOUS CONTINUOUS
Status: CANCELLED | OUTPATIENT
Start: 2021-01-08

## 2021-01-08 RX ORDER — CEFAZOLIN SODIUM 2 G/100ML
2 INJECTION, SOLUTION INTRAVENOUS ONCE
Status: COMPLETED | OUTPATIENT
Start: 2021-01-08 | End: 2021-01-08

## 2021-01-08 RX ORDER — HEPARIN SODIUM 1000 [USP'U]/ML
INJECTION, SOLUTION INTRAVENOUS; SUBCUTANEOUS AS NEEDED
Status: DISCONTINUED | OUTPATIENT
Start: 2021-01-08 | End: 2021-01-08 | Stop reason: HOSPADM

## 2021-01-08 RX ORDER — SODIUM CHLORIDE 9 MG/ML
INJECTION, SOLUTION INTRAVENOUS CONTINUOUS PRN
Status: COMPLETED | OUTPATIENT
Start: 2021-01-08 | End: 2021-01-08

## 2021-01-08 RX ORDER — MIDAZOLAM HYDROCHLORIDE 1 MG/ML
INJECTION INTRAMUSCULAR; INTRAVENOUS AS NEEDED
Status: DISCONTINUED | OUTPATIENT
Start: 2021-01-08 | End: 2021-01-08 | Stop reason: HOSPADM

## 2021-01-08 RX ORDER — BUPIVACAINE HYDROCHLORIDE 5 MG/ML
INJECTION, SOLUTION PERINEURAL AS NEEDED
Status: DISCONTINUED | OUTPATIENT
Start: 2021-01-08 | End: 2021-01-08 | Stop reason: HOSPADM

## 2021-01-08 RX ORDER — FLECAINIDE ACETATE 150 MG/1
75 TABLET ORAL ONCE
Status: DISCONTINUED | OUTPATIENT
Start: 2021-01-09 | End: 2021-01-09

## 2021-01-08 RX ORDER — LIDOCAINE HYDROCHLORIDE 10 MG/ML
INJECTION, SOLUTION INFILTRATION; PERINEURAL AS NEEDED
Status: DISCONTINUED | OUTPATIENT
Start: 2021-01-08 | End: 2021-01-08 | Stop reason: HOSPADM

## 2021-01-08 RX ORDER — FENTANYL CITRATE 50 UG/ML
INJECTION, SOLUTION INTRAMUSCULAR; INTRAVENOUS AS NEEDED
Status: DISCONTINUED | OUTPATIENT
Start: 2021-01-08 | End: 2021-01-08 | Stop reason: HOSPADM

## 2021-01-08 RX ORDER — ONDANSETRON 2 MG/ML
INJECTION INTRAMUSCULAR; INTRAVENOUS AS NEEDED
Status: DISCONTINUED | OUTPATIENT
Start: 2021-01-08 | End: 2021-01-08 | Stop reason: HOSPADM

## 2021-01-08 RX ADMIN — APIXABAN 5 MG: 5 TABLET, FILM COATED ORAL at 08:25

## 2021-01-08 RX ADMIN — SODIUM CHLORIDE, PRESERVATIVE FREE 10 ML: 5 INJECTION INTRAVENOUS at 08:25

## 2021-01-08 RX ADMIN — SODIUM CHLORIDE, PRESERVATIVE FREE 10 ML: 5 INJECTION INTRAVENOUS at 20:16

## 2021-01-08 RX ADMIN — FLECAINIDE ACETATE 75 MG: 150 TABLET ORAL at 08:25

## 2021-01-08 RX ADMIN — CEFAZOLIN SODIUM 2 G: 2 INJECTION, SOLUTION INTRAVENOUS at 13:51

## 2021-01-08 RX ADMIN — ATORVASTATIN CALCIUM 20 MG: 20 TABLET, FILM COATED ORAL at 08:26

## 2021-01-08 RX ADMIN — LISINOPRIL 10 MG: 10 TABLET ORAL at 08:25

## 2021-01-08 NOTE — PROGRESS NOTES
[facility] Medicine Services  PROGRESS NOTE    Patient Name: Jovon Gracia  : 1945  MRN: [id number]    Date of Admission: 2021  Primary Care Physician: Cheri Monaco MD    Subjective   Subjective     CC:  Follow-up syncope    HPI:  Pt resting in bed, no issues overnight. Planning for EP study today.     ROS:  Gen- No fevers, chills  CV- No chest pain, palpitations  Resp- No cough, dyspnea  GI- No N/V/D, abd pain  All systems reviewed and are neg, except those mentioned in HPI.    Objective   Objective     Vital Signs:   Temp:  [97.6 °F (36.4 °C)-99.2 °F (37.3 °C)] 98.6 °F (37 °C)  Heart Rate:  [50-75] 63  Resp:  [18] 18  BP: (100-165)/(53-94) 138/75        Physical Exam:  Constitutional: Awake, alert  Eyes: PERRLA, sclerae anicteric, no conjunctival injection  HENT: NCAT, mucous membranes moist  Neck: Supple, no thyromegaly, no lymphadenopathy, trachea midline  Respiratory: Clear to auscultation bilaterally, nonlabored respirations   Cardiovascular: RRR, no murmurs, rubs, or gallops, palpable pedal pulses bilaterally  Gastrointestinal: Positive bowel sounds, soft, nontender, nondistended  Musculoskeletal: No bilateral ankle edema, no clubbing or cyanosis to extremities  Psychiatric: Appropriate affect, cooperative  Neurologic: Oriented x 3, strength symmetric in all extremities, Cranial Nerves grossly intact to confrontation, speech clear  Skin: No rashes    Results Reviewed:  Results from last 7 days   Lab Units 21  1324   WBC 10*3/mm3 7.69   HEMOGLOBIN g/dL 15.0   HEMATOCRIT % 46.7   PLATELETS 10*3/mm3 284     Results from last 7 days   Lab Units 21  0933 21  1324   SODIUM mmol/L 137 128*   POTASSIUM mmol/L  --  4.3   CHLORIDE mmol/L  --  94*   CO2 mmol/L  --  25.0   BUN mg/dL  --  14   CREATININE mg/dL  --  0.77   GLUCOSE mg/dL  --  118*   CALCIUM mg/dL  --  9.7   ALT (SGPT) U/L  --  18   AST (SGOT) U/L  --  26   TROPONIN T ng/mL <0.010 <0.010      Estimated Creatinine Clearance: 82.2 mL/min (by C-G formula based on SCr of 0.77 mg/dL).    Microbiology Results Abnormal     Procedure Component Value - Date/Time    COVID PRE-OP / PRE-PROCEDURE SCREENING ORDER (NO ISOLATION) - Swab, Nasopharynx [[id number]]  (Normal) Collected: 01/07/21 0955    Lab Status: Final result Specimen: Swab from Nasopharynx Updated: 01/07/21 1020    Narrative:      The following orders were created for panel order COVID PRE-OP / PRE-PROCEDURE SCREENING ORDER (NO ISOLATION) - Swab, Nasopharynx.  Procedure                               Abnormality         Status                     ---------                               -----------         ------                     COVID-19, ABBOTT IN-HOUS...[[id number]]  Normal              Final result                 Please view results for these tests on the individual orders.    COVID-19, ABBOTT IN-HOUSE,NASAL Swab (NO TRANSPORT MEDIA) 2 HR TAT - Swab, Nasopharynx [[id number]]  (Normal) Collected: 01/07/21 0955    Lab Status: Final result Specimen: Swab from Nasopharynx Updated: 01/07/21 1020     COVID19 Presumptive Negative    Narrative:      Fact sheet for providers: https://www.fda.gov/media/713440/download     Fact sheet for patients: https://www.fda.gov/media/100742/download    Test performed by PCR.  If inconsistent with clinical signs and symptoms patient should be tested with different authorized molecular test.          Imaging Results (Last 24 Hours)     Procedure Component Value Units Date/Time    MRI Brain With & Without Contrast [[id number]] Collected: 01/06/21 1805     Updated: 01/07/21 1601    Narrative:      EXAMINATION: MRI BRAIN WWO CONTRAST - 01/06/2021     INDICATION: R56.9-Unspecified convulsions; E87.4-Kzux-llqjcvpvrl and  hyponatremia; Z79.01-Long term (current) use of anticoagulants;  Z86.79-Personal history of other diseases of the circulatory system.  Onset seizure, history of atrial fibrillation.     TECHNIQUE: Routine  multilevel imaging is obtained of the brain with and  without the administration of gadolinium contrast.     COMPARISON: None.     FINDINGS: There is no evidence of restricted diffusion to suggest  evidence of an acute ischemic insult. Flow voids are preserved in the  major intracranial vessels. There is atrophy identified of the brain. No  evidence of hemorrhage. No hydrocephalus. There are signal changes seen  scattered throughout the periventricular and subcortical white matter  suggesting chronic small vessel ischemic change. There is no abnormal  extra-axial fluid collection identified. The visualized paranasal  sinuses are clear. Globes and orbits are intact. The mastoid air cells  are patent. Pituitary and sella are unremarkable. Craniovertebral  junction is preserved. There is no evidence of mesial temporal  sclerosis. No evidence of cortical dysplasia. No abnormal contrast  enhancement to suggest evidence of an underlying mass or lesion. No  vascular malformation identified. The visualized vessels are grossly  unremarkable.       Impression:      Atrophy and chronic changes seen within the brain. No acute  intracranial abnormality. No abnormal contrast enhancement.     DICTATED:   01/06/2021  EDITED/ls :   01/06/2021                  This report was finalized on 1/7/2021 3:58 PM by Dr. María Nettles MD.       CT Head Without Contrast [[id number]] Collected: 01/06/21 1427     Updated: 01/07/21 1600    Narrative:      EXAMINATION: CT HEAD WO CONTRAST- 01/06/2021     INDICATION: Syncope, simple, normal neuro exam, dizziness     TECHNIQUE: Multiple axial CT imaging was obtained of the head from skull  base to skull vertex without the administration of intravenous contrast.     The radiation dose reduction device was turned on for each scan per the  ALARA (As Low as Reasonably Achievable) protocol.     COMPARISON: NONE     FINDINGS: Brain parenchyma is unremarkable. No hemorrhage or  hydrocephalus. No mass,  mass effect or midline shift. There is a small  scalp hematoma overlying the right occipital bone. There is no  hemorrhage or hydrocephalus. No mass, mass effect or midline shift. No  abnormal extra-axial fluid collections identified. The bony structures  reveal no evidence of osseous abnormality. Visualized paranasal sinuses  are clear.        Impression:      Scalp hematoma overlying the right occipital bone with no  acute intracranial abnormality.     D:  01/06/2021  E:  01/06/2021     This report was finalized on 1/7/2021 3:57 PM by Dr. María Nettles MD.             Results for orders placed during the hospital encounter of 01/06/21   Adult Transthoracic Echo Complete W/ Cont if Necessary Per Protocol    Addendum · Left ventricular systolic function is normal. Estimated left ventricular  EF = 70% · Aortic valve calcification noted without significant stenosis or  regurgitation. · Saline test results are negative.        Quinten Dial IV, MD 1/7/2021  1:29 PM          Narrative · Left ventricular ejection fraction appears to be greater than 70%.  · Left ventricular diastolic function was normal.  · Aortic valve calcification noted without significant stenosis or   regurgitation.  · Saline test results are negative.          I have reviewed the medications:  Scheduled Meds:apixaban, 5 mg, Oral, Q12H  atorvastatin, 20 mg, Oral, Nightly  flecainide, 75 mg, Oral, BID  lisinopril, 10 mg, Oral, Daily  sodium chloride, 10 mL, Intravenous, Q12H      Continuous Infusions:   PRN Meds:.•  acetaminophen  •  sodium chloride  •  sodium chloride    Assessment/Plan   Assessment & Plan     Active Hospital Problems    Diagnosis  POA   • **Syncope, convulsive [R55]  Yes   • Aortic atherosclerosis (CMS/HCC) [I70.0]  Yes   • Hyperlipidemia LDL goal <100 [E78.5]  Yes   • Essential hypertension [I10]  Yes   • Paroxysmal atrial fibrillation (CMS/HCC) [I48.0]  Yes      Resolved Hospital Problems   No resolved problems  to display.        Brief Hospital Course to date:  Jovon Gracia is a 75 y.o. male with atrial fibrillation on Eliquis, HTN, HLD who had a syncopal episode while driving with some associated jerking motion admitted to the hospital for further evaluation with unremarkable neurologic work-up and being further evaluated by cardiology    This patient is new to me today.     Assessment/plan    Syncope with convulsions  -EEG unremarkable, MRI without acute process  -Cardiac work-up ongoing; stress testing low risk/negative; Echo grossly unremarkable; cardiology following and this afternoon has recommended EP study today with possible device pending findings    Hyponatremia  -Patient reports drinking numerous bottles of water throughout the day, polydipsia is one possibility; in light of recent syncope SIADH could be an additional factor.   - 01/08 Na+ 128. Will obtain urine sodium, urine creatinine, urine osmo and serum osmo    Hypertension  Dyslipidemia  -Continue lisinopril and Lipitor    Atrial fibrillation  -Continue home Eliquis, flecainide    AAA  -PCP follow-up surveillance ultrasound    DVT Prophylaxis: Eliquis      Disposition: I expect the patient to be discharged TBD pending EP study results    CODE STATUS:   Code Status and Medical Interventions:   Ordered at: 01/06/21 1821     Level Of Support Discussed With:    Patient     Code Status:    CPR     Medical Interventions (Level of Support Prior to Arrest):    Full       Electronically signed by MIIRAM Olsen, 01/08/21, 1:17 PM EST.
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
## 2021-01-08 NOTE — PLAN OF CARE
VSS, no events overnight. Patient to have EP study today, NPO since midnight.   Problem: Adult Inpatient Plan of Care  Goal: Plan of Care Review  Outcome: Ongoing, Progressing  Goal: Patient-Specific Goal (Individualized)  Outcome: Ongoing, Progressing  Goal: Absence of Hospital-Acquired Illness or Injury  Outcome: Ongoing, Progressing  Intervention: Identify and Manage Fall Risk  Recent Flowsheet Documentation  Taken 1/8/2021 0200 by Darlene Orlando RN  Safety Promotion/Fall Prevention:   nonskid shoes/slippers when out of bed   safety round/check completed   activity supervised   assistive device/personal items within reach  Taken 1/8/2021 0000 by Darlene Orlando RN  Safety Promotion/Fall Prevention:   nonskid shoes/slippers when out of bed   safety round/check completed   activity supervised   assistive device/personal items within reach   fall prevention program maintained  Taken 1/7/2021 2200 by Darlene Orlando RN  Safety Promotion/Fall Prevention:   activity supervised   assistive device/personal items within reach   fall prevention program maintained   nonskid shoes/slippers when out of bed   safety round/check completed  Taken 1/7/2021 2000 by Darlene Orlando RN  Safety Promotion/Fall Prevention:   fall prevention program maintained   activity supervised   assistive device/personal items within reach   safety round/check completed   nonskid shoes/slippers when out of bed  Intervention: Prevent Skin Injury  Recent Flowsheet Documentation  Taken 1/8/2021 0200 by Darlene Orlando RN  Body Position:   position changed independently   supine  Taken 1/8/2021 0000 by Darlene Orlando RN  Body Position:   side-lying, left   position changed independently  Taken 1/7/2021 2200 by Darlene Orlando RN  Body Position:   position changed independently   sitting up in bed  Taken 1/7/2021 2000 by Darlene Orlando RN  Body Position:   sitting up in bed   position changed independently  Intervention: Prevent and Manage VTE (venous  thromboembolism) Risk  Recent Flowsheet Documentation  Taken 1/7/2021 2000 by Darlene Orlando, RN  VTE Prevention/Management: (eliquis) --  Goal: Optimal Comfort and Wellbeing  Outcome: Ongoing, Progressing  Intervention: Provide Person-Centered Care  Recent Flowsheet Documentation  Taken 1/7/2021 2000 by Darlene Orlando, RN  Trust Relationship/Rapport:   care explained   choices provided   thoughts/feelings acknowledged   questions encouraged  Goal: Readiness for Transition of Care  Outcome: Ongoing, Progressing     Problem: Syncope  Goal: Absence of Syncopal Symptoms  Outcome: Ongoing, Progressing     Problem: Neurologic Status Change (Mild Brain Injury)  Goal: Absence of Acute Neurologic Symptoms  Outcome: Ongoing, Progressing  Intervention: Monitor and Optimize Neurologic Status  Recent Flowsheet Documentation  Taken 1/7/2021 2200 by Darlene Orlando, RN  Enhanced Safety Measures: room near unit station  Taken 1/7/2021 2000 by Darlene Orlando, RN  Enhanced Safety Measures: room near unit station

## 2021-01-08 NOTE — PROGRESS NOTES
Cardiology Progress Note      Reason for visit:    · Syncope    IDENTIFICATION: 75-year-old  gentleman who resides in [place], [place]. 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    Active Hospital Problems    Diagnosis POA   • **Syncope, convulsive [R55] Yes     Priority: High     · Echo (1/7/2021): LVEF >70%.  No significant valvular abnormality.  Negative bubble study     • Paroxysmal atrial fibrillation (CMS/HCC) [I48.0] Yes     Priority: High     · CHADS-VASc 3 (age, HTN, aortic athero)  · Echocardiogram (10/21/2014): LVEF 60%.  No valvular or wall motion abnormalities.  · Cardiolite stress test (11/6/2014): No ischemia/infarct.  Normal LVEF  · Event monitoring showing atrial fibrillation, symptomatic with palpitations and chest pain.   · Pharmacologic nuclear stress (1/7/2021): Normal perfusion.  Normal LVEF  · Echo (1/7/2021): LVEF >70%.  No significant valvular abnormality.  Negative bubble study     • Hyperlipidemia LDL goal <100 [E78.5] Yes     Priority: Medium     · Moderate intensity statin therapy indicated for primary prevention     • Essential hypertension [I10] Yes     Priority: Medium     • Target blood pressure <130/80 mmHg     • Aortic atherosclerosis (CMS/HCC) [I70.0] Yes     · Noted on CT attenuation correction images from stress test, 1/7/2021              No events have been noted overnight.  He has had no arrhythmias on telemetry and no further syncopal spells.  He is currently sitting up in the chair without complaints.           Vital Sign Min/Max for last 24 hours  Temp  Min: 97.6 °F (36.4 °C)  Max: 99.2 °F (37.3 °C)   BP  Min: 100/53  Max: 165/94   Pulse  Min: 50  Max: 75   Resp  Min: 18  Max: 18   SpO2  Min: 95 %  Max: 99 %   No data recorded    No intake or output data in the 24 hours ending 01/08/21 0851        Physical Exam  Constitutional:       Appearance: He is well-developed.   HENT:      Head: Normocephalic.   Neck:      Vascular: No carotid bruit or JVD.   Cardiovascular:      Rate  and Rhythm: Normal rate and regular rhythm.      Heart sounds: Normal heart sounds. No murmur. No friction rub. No gallop.    Pulmonary:      Effort: Pulmonary effort is normal.      Breath sounds: Normal breath sounds.   Abdominal:      General: Bowel sounds are normal. There is no distension.      Palpations: Abdomen is soft.   Skin:     General: Skin is warm and dry.   Neurological:      Mental Status: He is alert and oriented to person, place, and time.         Tele: Normal sinus rhythm    Results Review (reviewed the patient's recent labs in the electronic medical record):     EKG (1/6/2021): NSR with first-degree AV block, incomplete right bundle branch block and left anterior fascicular block    ECHO (1/7/2021): LVEF 70%.  Aortic valve calcification without significant stenosis or regurgitation.  Negative bubble study    Myocardial perfusion study (1/7/2021): No ischemia.  Low risk study.  Normal LVEF.     Result Review:  I have personally reviewed the results from the time of this admission to 01/08/21 8:51 AM EST and agree with these findings:  [x]  Laboratory  []  Microbiology  [x]  Radiology  [x]  EKG/Telemetry   [x]  Cardiology/Vascular   []  Pathology  []  Old records  []  Other:  Most notable findings include: Normal LVEF, no ischemia on stress test    Results from last 7 days   Lab Units 01/07/21  0933 01/06/21  1324   SODIUM mmol/L 137 128*   POTASSIUM mmol/L  --  4.3   CHLORIDE mmol/L  --  94*   BUN mg/dL  --  14   CREATININE mg/dL  --  0.77   MAGNESIUM mg/dL  --  2.0     Results from last 7 days   Lab Units 01/07/21  0933 01/06/21  1324   TROPONIN T ng/mL <0.010 <0.010     Results from last 7 days   Lab Units 01/06/21  1324   WBC 10*3/mm3 7.69   HEMOGLOBIN g/dL 15.0   HEMATOCRIT % 46.7   PLATELETS 10*3/mm3 284       Lab Results   Component Value Date    HGBA1C 5.60 01/06/2021       Lab Results   Component Value Date    CHOL 142 01/08/2021    CHLPL 120 08/21/2018    TRIG 76 01/08/2021    HDL 53  01/08/2021    LDL 74 01/08/2021            Syncope with structurally normal heart  · Abrupt onset and recovery with complete loss of consciousness is concerning for cardiac etiology  · Patient has conduction disease noted on resting EKG, has paroxysmal atrial fibrillation, and is on antiarrhythmic medical therapy--all of which increase probability of symptomatic heart block  · EP study planned for today with Dr. Meneses.      Paroxysmal atrial fibrillation   · No atrial fibrillation noted on telemetry  · Patient has been maintained on flecainide without AV vu blocking agent due to bradycardia     Hypertension  · Controlled  · Continue lisinopril     Hyperlipidemia  · Continue atorvastatin               · EP study today with possible pacemaker versus loop recorder implant.    Electronically signed by MIRIAM Taveras, 01/08/21, 8:51 AM EST.
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
## 2021-01-09 ENCOUNTER — APPOINTMENT (OUTPATIENT)
Dept: GENERAL RADIOLOGY | Facility: HOSPITAL | Age: 76
End: 2021-01-09

## 2021-01-09 VITALS
TEMPERATURE: 98 F | HEIGHT: 68 IN | SYSTOLIC BLOOD PRESSURE: 152 MMHG | RESPIRATION RATE: 20 BRPM | BODY MASS INDEX: 23.95 KG/M2 | WEIGHT: 158 LBS | OXYGEN SATURATION: 95 % | HEART RATE: 61 BPM | DIASTOLIC BLOOD PRESSURE: 78 MMHG

## 2021-01-09 PROCEDURE — A9270 NON-COVERED ITEM OR SERVICE: HCPCS | Performed by: PHYSICIAN ASSISTANT

## 2021-01-09 PROCEDURE — 63710000001 FLECAINIDE 150 MG TABLET: Performed by: HOSPITALIST

## 2021-01-09 PROCEDURE — 99214 OFFICE O/P EST MOD 30 MIN: CPT | Performed by: INTERNAL MEDICINE

## 2021-01-09 PROCEDURE — A9270 NON-COVERED ITEM OR SERVICE: HCPCS | Performed by: HOSPITALIST

## 2021-01-09 PROCEDURE — 63710000001 ATORVASTATIN 20 MG TABLET: Performed by: PHYSICIAN ASSISTANT

## 2021-01-09 PROCEDURE — 99217 PR OBSERVATION CARE DISCHARGE MANAGEMENT: CPT | Performed by: INTERNAL MEDICINE

## 2021-01-09 PROCEDURE — 71046 X-RAY EXAM CHEST 2 VIEWS: CPT

## 2021-01-09 PROCEDURE — 63710000001 APIXABAN 5 MG TABLET: Performed by: HOSPITALIST

## 2021-01-09 PROCEDURE — G0378 HOSPITAL OBSERVATION PER HR: HCPCS

## 2021-01-09 PROCEDURE — 63710000001 LISINOPRIL 10 MG TABLET: Performed by: PHYSICIAN ASSISTANT

## 2021-01-09 RX ADMIN — FLECAINIDE ACETATE 75 MG: 150 TABLET ORAL at 08:54

## 2021-01-09 RX ADMIN — SODIUM CHLORIDE, PRESERVATIVE FREE 10 ML: 5 INJECTION INTRAVENOUS at 08:54

## 2021-01-09 RX ADMIN — LISINOPRIL 10 MG: 10 TABLET ORAL at 08:54

## 2021-01-09 RX ADMIN — ATORVASTATIN CALCIUM 20 MG: 20 TABLET, FILM COATED ORAL at 10:06

## 2021-01-09 RX ADMIN — APIXABAN 5 MG: 5 TABLET, FILM COATED ORAL at 08:55
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
## 2021-01-10 ENCOUNTER — APPOINTMENT (OUTPATIENT)
Dept: PREADMISSION TESTING | Facility: HOSPITAL | Age: 76
End: 2021-01-10

## 2021-01-11 ENCOUNTER — NURSE TRIAGE (OUTPATIENT)
Dept: CALL CENTER | Facility: HOSPITAL | Age: 76
End: 2021-01-11

## 2021-01-11 NOTE — TELEPHONE ENCOUNTER
"Reviewed guideline with caller, advises he contact the Cardiologist that placed the pacemaker. He states he was going to call that office today for follow up appointment. Advised to let them know about this area beneath the insertion site. Caller agrees to follow care advice.     Reason for Disposition  • [1] Caller has URGENT question AND [2] triager unable to answer question    Additional Information  • Negative: [1] Major abdominal surgical incision AND [2] wound gaping open AND [3] visible internal organs  • Negative: Sounds like a life-threatening emergency to the triager  • Negative: [1] Bleeding from incision AND [2] won't stop after 10 minutes of direct pressure  • Negative: [1] Widespread rash AND [2] bright red, sunburn-like  • Negative: Severe pain in the incision  • Negative: [1] Incision gaping open AND [2] < 48 hours since wound re-opened  • Negative: [1] Incision gaping open AND [2] length of opening > 2 inches (5 cm)  • Negative: Patient sounds very sick or weak to the triager  • Negative: Sounds like a serious complication to the triager  • Negative: Fever > 100.4 F (38.0 C)  • Negative: [1] Incision looks infected (spreading redness, pain) AND [2] fever > 99.5 F (37.5 C)  • Negative: [1] Incision looks infected (spreading redness, pain) AND [2] large red area (> 2 in. or 5 cm)  • Negative: [1] Incision looks infected (spreading redness, pain) AND [2] face wound  • Negative: [1] Red streak runs from the incision AND [2] longer than 1 inch (2.5 cm)  • Negative: [1] Pus or bad-smelling fluid draining from incision AND [2] no fever  • Negative: [1] Post-op pain AND [2] not controlled with pain medications  • Negative: Dressing soaked with blood or body fluid (e.g., drainage)  • Negative: [1] Raised bruise and [2] size > 2 inches (5 cm) and expanding    Answer Assessment - Initial Assessment Questions  1. SYMPTOM: \"What's the main symptom you're concerned about?\" (e.g., redness, pain, drainage)      " "Purple blotches beneath right groin insertion site  2. ONSET: \"When did purple blotches  start?\"      Today   3. SURGERY: \"What surgery was performed?\"      Pacemaker insertion   4. DATE of SURGERY: \"When was surgery performed?\"       Friday   5. INCISION SITE: \"Where is the incision located?\"       Right groin  6. REDNESS: \"Is there any redness at the incision site?\" If yes, ask: \"How wide across is the redness?\" (Inches, centimeters)       Little bit   7. PAIN: \"Is there any pain?\" If so, ask: \"How bad is it?\"  (Scale 1-10; or mild, moderate, severe)      no  8. BLEEDING: \"Is there any bleeding?\" If so, ask: \"How much?\" and \"Where?\"      no  9. DRAINAGE: \"Is there any drainage from the incision site?\" If yes, ask: \"What color and how much?\" (e.g., red, cloudy, pus; drops, teaspoon)      no  10. FEVER: \"Do you have a fever?\" If so, ask: \"What is your temperature, how was it measured, and when did it start?\"        no  11. OTHER SYMPTOMS: \"Do you have any other symptoms?\" (e.g., shaking chills, weakness, rash elsewhere on body)        no    Protocols used: POST-OP INCISION SYMPTOMS AND QUESTIONS-ADULT-AH      "

## 2021-01-14 ENCOUNTER — TELEPHONE (OUTPATIENT)
Dept: CARDIOLOGY | Facility: CLINIC | Age: 76
End: 2021-01-14

## 2021-01-14 ENCOUNTER — NURSE TRIAGE (OUTPATIENT)
Dept: CALL CENTER | Facility: HOSPITAL | Age: 76
End: 2021-01-14

## 2021-01-14 NOTE — TELEPHONE ENCOUNTER
Patient had a MICRA placed on 1-8-21. He states that the insertion site in his groin has became swollen and tender to touch today. He denies fever, chills, red streaking or redness around the area. He is going to come in tomorrow for a wound check at 10:00 am.

## 2021-01-14 NOTE — TELEPHONE ENCOUNTER
"Reviewed guideline with caller, advises he call the Cardiologist who placed his Pacemaker with this concern. Caller agrees to follow care advice.     Reason for Disposition  • [1] Caller has URGENT question AND [2] triager unable to answer question    Additional Information  • Negative: Sounds like a life-threatening emergency to the triager  • Negative: Chest pain  • Negative: Difficulty breathing  • Negative: Acting confused (e.g., disoriented, slurred speech) or excessively sleepy  • Negative: Surgical incision symptoms and questions  • Negative: [1] Discomfort (pain, burning or stinging) when passing urine AND [2] male  • Negative: [1] Discomfort (pain, burning or stinging) when passing urine AND [2] female  • Negative: Constipation  • Negative: New or worsening leg (calf, thigh) pain  • Negative: New or worsening leg swelling  • Negative: Dizziness is severe, or persists > 24 hours after surgery  • Negative: Pain, redness, swelling, or pus at IV Site  • Negative: Symptoms arising from use of a urinary catheter (Storey or Coude)  • Negative: Cast problems or questions  • Negative: Medication question  • Negative: [1] Widespread rash AND [2] bright red, sunburn-like  • Negative: [1] SEVERE headache AND [2] after spinal (epidural) anesthesia  • Negative: [1] Vomiting AND [2] persists > 4 hours  • Negative: [1] Vomiting AND [2] abdomen looks much more swollen than usual  • Negative: [1] Drinking very little AND [2] dehydration suspected (e.g., no urine > 12 hours, very dry mouth, very lightheaded)  • Negative: Patient sounds very sick or weak to the triager  • Negative: Sounds like a serious complication to the triager  • Negative: Fever > 100.4 F (38.0 C)  • Negative: [1] SEVERE post-op pain (e.g., excruciating, pain scale 8-10) AND [2] not controlled with pain medications    Answer Assessment - Initial Assessment Questions  1. SYMPTOM: \"What's the main symptom you're concerned about?\" (e.g., pain, fever, " "vomiting)      Swelling in right groin, sensitive to touch  2. ONSET: \"When did swelling  start?\"      today  3. SURGERY: \"What surgery was performed?\"      Pacemaker insertion through the right groin   4. DATE of SURGERY: \"When was surgery performed?\"       Last Friday   5. ANESTHESIA: \" What type of anesthesia did you have?\" (e.g., general, spinal, epidural, local)      General   6. PAIN: \"Is there any pain?\" If so, ask: \"How bad is it?\"  (Scale 1-10; or mild, moderate, severe)      Sensitive to touch  7. FEVER: \"Do you have a fever?\" If so, ask: \"What is your temperature, how was it measured, and when did it start?\"      no  8. VOMITING: \"Is there any vomiting?\" If yes, ask: \"How many times?\"      no  9. BLEEDING: \"Is there any bleeding?\" If so, ask: \"How much?\" and \"Where?\"      Bruising at the site postop  10. OTHER SYMPTOMS: \"Do you have any other symptoms?\" (e.g., drainage from wound, painful urination, constipation)        no    Protocols used: POST-OP SYMPTOMS AND QUESTIONS-ADULT-AH      "

## 2021-01-15 LAB
QT INTERVAL: 450 MS
QTC INTERVAL: 445 MS

## 2021-02-12 PROBLEM — Z95.0 PRESENCE OF CARDIAC PACEMAKER: Status: ACTIVE | Noted: 2021-02-12
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
## 2021-02-20 PROCEDURE — 93294 REM INTERROG EVL PM/LDLS PM: CPT | Performed by: INTERNAL MEDICINE

## 2021-02-20 PROCEDURE — 93296 REM INTERROG EVL PM/IDS: CPT | Performed by: INTERNAL MEDICINE

## 2021-02-24 ENCOUNTER — OFFICE VISIT (OUTPATIENT)
Dept: CARDIOLOGY | Facility: CLINIC | Age: 76
End: 2021-02-24

## 2021-02-24 VITALS
DIASTOLIC BLOOD PRESSURE: 82 MMHG | HEART RATE: 67 BPM | OXYGEN SATURATION: 97 % | BODY MASS INDEX: 24.25 KG/M2 | WEIGHT: 160 LBS | HEIGHT: 68 IN | SYSTOLIC BLOOD PRESSURE: 122 MMHG

## 2021-02-24 DIAGNOSIS — I10 ESSENTIAL HYPERTENSION: ICD-10-CM

## 2021-02-24 DIAGNOSIS — R55 SYNCOPE, CARDIOGENIC: ICD-10-CM

## 2021-02-24 DIAGNOSIS — I48.0 PAROXYSMAL ATRIAL FIBRILLATION (HCC): Primary | ICD-10-CM

## 2021-02-24 DIAGNOSIS — E78.5 HYPERLIPIDEMIA LDL GOAL <100: ICD-10-CM

## 2021-02-24 DIAGNOSIS — Z95.0 PRESENCE OF CARDIAC PACEMAKER: ICD-10-CM

## 2021-02-24 PROCEDURE — 93000 ELECTROCARDIOGRAM COMPLETE: CPT | Performed by: NURSE PRACTITIONER

## 2021-02-24 PROCEDURE — 99214 OFFICE O/P EST MOD 30 MIN: CPT | Performed by: NURSE PRACTITIONER

## 2021-02-24 NOTE — ASSESSMENT & PLAN NOTE
· Device transmission from home 1/2021 showed no events or arrhythmias.  Maintaining NSR  · Follow-up with Dr. Meneses at already scheduled appointment in April for in person device interrogation

## 2021-04-05 ENCOUNTER — APPOINTMENT (OUTPATIENT)
Dept: SURGICAL ONCOLOGY | Facility: CLINIC | Age: 76
End: 2021-04-05
Payer: MEDICARE

## 2021-04-05 VITALS
BODY MASS INDEX: 36.68 KG/M2 | OXYGEN SATURATION: 96 % | DIASTOLIC BLOOD PRESSURE: 84 MMHG | HEART RATE: 98 BPM | SYSTOLIC BLOOD PRESSURE: 143 MMHG | WEIGHT: 263 LBS

## 2021-04-05 PROCEDURE — 99205 OFFICE O/P NEW HI 60 MIN: CPT

## 2021-04-05 NOTE — CONSULT LETTER
[Dear  ___] : Dear  [unfilled], [Consult Letter:] : I had the pleasure of evaluating your patient, [unfilled]. [Please see my note below.] : Please see my note below. [Consult Closing:] : Thank you very much for allowing me to participate in the care of this patient.  If you have any questions, please do not hesitate to contact me. [Sincerely,] : Sincerely, [DrOsbaldo  ___] : Dr. PETIT [FreeTextEntry1] : I will keep you informed of the final pathology. [FreeTextEntry3] : Terry Sepulveda MD FACS\par Chief of Surgical Oncology\par \par

## 2021-04-05 NOTE — ASSESSMENT
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

## 2021-04-05 NOTE — HISTORY OF PRESENT ILLNESS
[de-identified] : 75 year-old male presents for an initial consultation.  He noticed a few month history of a small red dot on the skin of his forehead.  He noticed associated skin scaling which waxed and waned.  On 3/5/21 he sought care with his dermatologist Dr. Buck Duncan and underwent a shave biopsy of the lesion.  Pathology was consistent with melanoma in situ extending to the side margins and tissue base.\par \par He has no history of skin cancer until now.  His past medical history includes hypertension, hyperlipidemia, BAUTISTA (on CPAP), thyroid cancer (s/p total thyroidectomy), prostate cancer (s/p brachytherapy), DVT 2013 (following a traumatic fall).  He takes baby aspirin for prophylaxis.  He has a family history of prostate cancer involving his father at 85 and colon cancer involving his mother at 85.  He denies tobacco use and drinks alcohol 3 times per week.\par \par He denies any additional new or changing skin lesions or constitutional symptoms.\par \par Referring MD: Dr. Buck Duncan\par PCP: Dr. Bj Garcia

## 2021-04-05 NOTE — PHYSICAL EXAM
[Normal] : supple, no neck mass and thyroid not enlarged [Normal Neck Lymph Nodes] : normal neck lymph nodes  [Normal Supraclavicular Lymph Nodes] : normal supraclavicular lymph nodes [Normal] : oriented to person, place and time, with appropriate affect [de-identified] : Normal parotid nodes [de-identified] : Biopsy site superior mid forehead with no residual pigmentation and no evidence of satellite lesions but scaley area

## 2021-04-07 ENCOUNTER — RESULT REVIEW (OUTPATIENT)
Age: 76
End: 2021-04-07

## 2021-04-07 ENCOUNTER — OUTPATIENT (OUTPATIENT)
Dept: OUTPATIENT SERVICES | Facility: HOSPITAL | Age: 76
LOS: 1 days | End: 2021-04-07
Payer: MEDICARE

## 2021-04-07 DIAGNOSIS — D03.39 MELANOMA IN SITU OF OTHER PARTS OF FACE: ICD-10-CM

## 2021-04-07 PROCEDURE — 88321 CONSLTJ&REPRT SLD PREP ELSWR: CPT

## 2021-04-08 ENCOUNTER — OUTPATIENT (OUTPATIENT)
Dept: OUTPATIENT SERVICES | Facility: HOSPITAL | Age: 76
LOS: 1 days | End: 2021-04-08
Payer: MEDICARE

## 2021-04-08 VITALS
DIASTOLIC BLOOD PRESSURE: 80 MMHG | HEART RATE: 80 BPM | TEMPERATURE: 97 F | HEIGHT: 72 IN | SYSTOLIC BLOOD PRESSURE: 140 MMHG | WEIGHT: 259.93 LBS | OXYGEN SATURATION: 97 % | RESPIRATION RATE: 14 BRPM

## 2021-04-08 DIAGNOSIS — Z98.890 OTHER SPECIFIED POSTPROCEDURAL STATES: Chronic | ICD-10-CM

## 2021-04-08 DIAGNOSIS — D03.9 MELANOMA IN SITU, UNSPECIFIED: ICD-10-CM

## 2021-04-08 LAB — SURGICAL PATHOLOGY STUDY: SIGNIFICANT CHANGE UP

## 2021-04-08 PROCEDURE — 93010 ELECTROCARDIOGRAM REPORT: CPT

## 2021-04-08 NOTE — H&P PST ADULT - NSICDXPASTSURGICALHX_GEN_ALL_CORE_FT
PAST SURGICAL HISTORY:  History of thyroidectomy      PAST SURGICAL HISTORY:  History of thyroidectomy 2007     PAST SURGICAL HISTORY:  History of thyroidectomy 2007    S/P arthroscopy right shoulder repair of ligaments 2013

## 2021-04-08 NOTE — H&P PST ADULT - HISTORY OF PRESENT ILLNESS
74y/o male scheduled for excision of melanoma of forehead on 4/21/2021.  Pt states, "noticed lesion on forehead for the past 5 months, bx positive of melanoma in situ."

## 2021-04-08 NOTE — H&P PST ADULT - MARITAL STATUS
2 children, mother  89y/o colon cancer, father  86y/o prostate cancer, 2 sisters 1 sister hx colon cancer, 1 brother  71y/o stomach cancer/

## 2021-04-08 NOTE — H&P PST ADULT - NSICDXPROBLEM_GEN_ALL_CORE_FT
PROBLEM DIAGNOSES  Problem: Melanoma in situ  Assessment and Plan: Pt scheduled for excision melanoma of forehead on 4/21/2021.  labs done in pmd office results in chart.  ekg done  pt is scheduled for covid testing.  Preop teaching done, pt able to verbalize understanding.   meds day of surgery:  synthroid, nifedipine, losartan, proair  OR booking notified of sleep apnea

## 2021-04-08 NOTE — H&P PST ADULT - NEGATIVE GENERAL GENITOURINARY SYMPTOMS
no hematuria/no flank pain L/no flank pain R/no bladder infections/no urinary hesitancy/normal urinary frequency

## 2021-04-08 NOTE — H&P PST ADULT - GASTROINTESTINAL DETAILS
soft/nontender/no masses palpable/bowel sounds normal/no bruit/no rebound tenderness/no guarding/no rigidity/no organomegaly

## 2021-04-08 NOTE — H&P PST ADULT - NSICDXPASTMEDICALHX_GEN_ALL_CORE_FT
PAST MEDICAL HISTORY:  Asthma     BPH (benign prostatic hypertrophy)     HTN (hypertension)     Hyperlipidemia     BAUTISTA on CPAP     Prostate ca seed implants 6/2012    Seasonal allergies     Thyroid cancer 2006     PAST MEDICAL HISTORY:  Asthma     BPH (benign prostatic hypertrophy)     HTN (hypertension)     Hyperlipidemia     BAUTISTA on CPAP     Prostate ca seed implants 6/2012    Seasonal allergies     Thyroid cancer 2007, s/p radioactive iodine     PAST MEDICAL HISTORY:  Asthma     BPH (benign prostatic hypertrophy)     DVT (deep venous thrombosis) left lower leg 2013 tx with coumadin X 3 months    HTN (hypertension)     Hyperlipidemia     Melanoma in situ forehead    Mild asthma     Obese     BAUTISTA on CPAP     Prostate ca seed implants 6/2012    Seasonal allergies     Thyroid cancer 2007, s/p radioactive iodine

## 2021-04-12 ENCOUNTER — OFFICE VISIT (OUTPATIENT)
Dept: CARDIOLOGY | Facility: CLINIC | Age: 76
End: 2021-04-12

## 2021-04-12 ENCOUNTER — APPOINTMENT (OUTPATIENT)
Dept: PLASTIC SURGERY | Facility: CLINIC | Age: 76
End: 2021-04-12
Payer: MEDICARE

## 2021-04-12 VITALS
SYSTOLIC BLOOD PRESSURE: 120 MMHG | OXYGEN SATURATION: 98 % | HEART RATE: 63 BPM | WEIGHT: 260 LBS | TEMPERATURE: 98.7 F | BODY MASS INDEX: 35.21 KG/M2 | HEIGHT: 72 IN | DIASTOLIC BLOOD PRESSURE: 81 MMHG

## 2021-04-12 VITALS
WEIGHT: 160.8 LBS | BODY MASS INDEX: 24.37 KG/M2 | DIASTOLIC BLOOD PRESSURE: 68 MMHG | SYSTOLIC BLOOD PRESSURE: 118 MMHG | OXYGEN SATURATION: 98 % | HEIGHT: 68 IN | HEART RATE: 70 BPM

## 2021-04-12 DIAGNOSIS — I10 ESSENTIAL HYPERTENSION: ICD-10-CM

## 2021-04-12 DIAGNOSIS — I48.0 PAROXYSMAL ATRIAL FIBRILLATION (HCC): ICD-10-CM

## 2021-04-12 DIAGNOSIS — R55 SYNCOPE, CARDIOGENIC: ICD-10-CM

## 2021-04-12 DIAGNOSIS — I45.89 HIS-PURKINJE DYSFUNCTION: Primary | ICD-10-CM

## 2021-04-12 DIAGNOSIS — Z95.0 PRESENCE OF CARDIAC PACEMAKER: ICD-10-CM

## 2021-04-12 PROCEDURE — 99203 OFFICE O/P NEW LOW 30 MIN: CPT

## 2021-04-12 PROCEDURE — 93279 PRGRMG DEV EVAL PM/LDLS PM: CPT | Performed by: INTERNAL MEDICINE

## 2021-04-12 PROCEDURE — 99214 OFFICE O/P EST MOD 30 MIN: CPT | Performed by: INTERNAL MEDICINE
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
## 2021-04-13 NOTE — REASON FOR VISIT
[Initial Evaluation] : an initial evaluation [Spouse] : spouse [FreeTextEntry1] : pt is here for Pre-op evaluation, surgery scheduled for 04/21/21 for Melanoma on forehead. pt noticed a few month history of a small red dot on the skin of his forehead. pt went to dermatology for evaluation and shave biopsy on 03/05/21, biopsy confirmed Melanoma in situ. pt denies any personal or family hx of skin cancer.

## 2021-04-13 NOTE — HISTORY OF PRESENT ILLNESS
[FreeTextEntry1] : Pt here to discuss closure after excision of melanoma on forehead.  Pt noticed a small red dot on the skin of his forehead about a few months ago.  Pt went to dermatology and had a shave biopsy done which confirmed melanoma in situ.  Pt denies any personal or family hx of skin cancer.

## 2021-04-13 NOTE — REVIEW OF SYSTEMS
[Fever] : no fever [Chills] : no chills [Negative] : Neurological [FreeTextEntry6] : sleep apnea [FreeTextEntry9] : History of right shoulder injury [de-identified] : +forehead lesion  [de-identified] : History of DVT treated with three months of anticoagulation after a shoulder injury.

## 2021-04-13 NOTE — PHYSICAL EXAM
[NI] : Normal [de-identified] : +forehead lesion  [de-identified] : forrehead with scaly area in area of shave biopsy

## 2021-04-18 ENCOUNTER — APPOINTMENT (OUTPATIENT)
Dept: DISASTER EMERGENCY | Facility: CLINIC | Age: 76
End: 2021-04-18

## 2021-04-18 DIAGNOSIS — Z01.818 ENCOUNTER FOR OTHER PREPROCEDURAL EXAMINATION: ICD-10-CM

## 2021-04-19 LAB — SARS-COV-2 N GENE NPH QL NAA+PROBE: NOT DETECTED

## 2021-04-20 ENCOUNTER — TRANSCRIPTION ENCOUNTER (OUTPATIENT)
Age: 76
End: 2021-04-20

## 2021-04-21 ENCOUNTER — APPOINTMENT (OUTPATIENT)
Dept: PLASTIC SURGERY | Facility: HOSPITAL | Age: 76
End: 2021-04-21
Payer: MEDICARE

## 2021-04-21 ENCOUNTER — OUTPATIENT (OUTPATIENT)
Dept: OUTPATIENT SERVICES | Facility: HOSPITAL | Age: 76
LOS: 1 days | Discharge: HOME | End: 2021-04-21
Payer: MEDICARE

## 2021-04-21 ENCOUNTER — APPOINTMENT (OUTPATIENT)
Dept: SURGICAL ONCOLOGY | Facility: AMBULATORY SURGERY CENTER | Age: 76
End: 2021-04-21

## 2021-04-21 VITALS
TEMPERATURE: 98 F | RESPIRATION RATE: 18 BRPM | HEART RATE: 71 BPM | DIASTOLIC BLOOD PRESSURE: 61 MMHG | OXYGEN SATURATION: 99 % | SYSTOLIC BLOOD PRESSURE: 116 MMHG

## 2021-04-21 VITALS
TEMPERATURE: 98 F | HEIGHT: 72 IN | HEART RATE: 66 BPM | RESPIRATION RATE: 18 BRPM | SYSTOLIC BLOOD PRESSURE: 116 MMHG | OXYGEN SATURATION: 99 % | WEIGHT: 259.93 LBS | DIASTOLIC BLOOD PRESSURE: 59 MMHG

## 2021-04-21 DIAGNOSIS — Z98.890 OTHER SPECIFIED POSTPROCEDURAL STATES: Chronic | ICD-10-CM

## 2021-04-21 DIAGNOSIS — D03.9 MELANOMA IN SITU, UNSPECIFIED: ICD-10-CM

## 2021-04-21 PROBLEM — I82.409 ACUTE EMBOLISM AND THROMBOSIS OF UNSPECIFIED DEEP VEINS OF UNSPECIFIED LOWER EXTREMITY: Chronic | Status: ACTIVE | Noted: 2021-04-08

## 2021-04-21 PROBLEM — J45.909 UNSPECIFIED ASTHMA, UNCOMPLICATED: Chronic | Status: ACTIVE | Noted: 2021-04-08

## 2021-04-21 PROBLEM — E66.9 OBESITY, UNSPECIFIED: Chronic | Status: ACTIVE | Noted: 2021-04-08

## 2021-04-21 PROCEDURE — 21016 RESECT FACE/SCALP TUM 2 CM/>: CPT

## 2021-04-21 PROCEDURE — 14040 TIS TRNFR F/C/C/M/N/A/G/H/F: CPT

## 2021-04-21 RX ORDER — LEVOTHYROXINE SODIUM 125 MCG
1 TABLET ORAL
Qty: 0 | Refills: 0 | DISCHARGE

## 2021-04-21 RX ORDER — LOSARTAN POTASSIUM 100 MG/1
1 TABLET, FILM COATED ORAL
Qty: 0 | Refills: 0 | DISCHARGE

## 2021-04-21 RX ORDER — ASPIRIN/CALCIUM CARB/MAGNESIUM 324 MG
1 TABLET ORAL
Qty: 0 | Refills: 0 | DISCHARGE

## 2021-04-21 RX ORDER — ATORVASTATIN CALCIUM 80 MG/1
1 TABLET, FILM COATED ORAL
Qty: 0 | Refills: 0 | DISCHARGE

## 2021-04-21 RX ORDER — MULTIVIT-MIN/FERROUS GLUCONATE 9 MG/15 ML
1 LIQUID (ML) ORAL
Qty: 0 | Refills: 0 | DISCHARGE

## 2021-04-21 RX ORDER — NIFEDIPINE 30 MG
1 TABLET, EXTENDED RELEASE 24 HR ORAL
Qty: 0 | Refills: 0 | DISCHARGE

## 2021-04-21 RX ORDER — ALBUTEROL 90 UG/1
2 AEROSOL, METERED ORAL
Qty: 0 | Refills: 0 | DISCHARGE

## 2021-04-21 RX ORDER — MONTELUKAST 4 MG/1
1 TABLET, CHEWABLE ORAL
Qty: 0 | Refills: 0 | DISCHARGE

## 2021-04-21 RX ORDER — OXYCODONE HYDROCHLORIDE 5 MG/1
1 TABLET ORAL
Qty: 10 | Refills: 0
Start: 2021-04-21 | End: 2021-04-25

## 2021-04-21 NOTE — ASU DISCHARGE PLAN (ADULT/PEDIATRIC) - CARE PROVIDER_API CALL
Ulisses Tay)  Plastic Surgery  [address] [place] [address], [address]  [place] [address]  Phone: [phone]  Fax: [phone]  Follow Up Time: 1 week    Terry Sepulveda)  Surgery  [address] [facility], [place] [address]  Phone: [phone]  Fax: [phone]  Follow Up Time: Routine

## 2021-04-21 NOTE — ASU DISCHARGE PLAN (ADULT/PEDIATRIC) - ASU DC SPECIAL INSTRUCTIONSFT
Please leave dressing in place until falling off on its own. It is okay to take a shower tomorrow. If the dressing falls off, please apply bacitracin twice daily to the incision.    Please take over the counter medication for pain or stronger medication as needed for severe pain.

## 2021-04-21 NOTE — BRIEF OPERATIVE NOTE - OPERATION/FINDINGS
Procedure: wide local excision of scalp melanoma  Preop dx: melanoma in situ of scalp  Postop dx: same    WLE of scalp melanoma. Specimen taken w/ 0.5cm margins. Followed by closure by plastics team

## 2021-04-21 NOTE — ASU DISCHARGE PLAN (ADULT/PEDIATRIC) - PROVIDER TOKENS
PROVIDER:[TOKEN:[[id number]],FOLLOWUP:[1 week]],PROVIDER:[TOKEN:[[id number]],FOLLOWUP:[Routine]]

## 2021-04-22 ENCOUNTER — RESULT REVIEW (OUTPATIENT)
Age: 76
End: 2021-04-22

## 2021-04-22 ENCOUNTER — APPOINTMENT (OUTPATIENT)
Dept: PLASTIC SURGERY | Facility: CLINIC | Age: 76
End: 2021-04-22
Payer: MEDICARE

## 2021-04-22 PROCEDURE — 99024 POSTOP FOLLOW-UP VISIT: CPT

## 2021-04-22 PROCEDURE — 88305 TISSUE EXAM BY PATHOLOGIST: CPT | Mod: 26

## 2021-04-22 NOTE — HISTORY OF PRESENT ILLNESS
[FreeTextEntry1] : Pt s/p closure of forehead lesion.  Pt doing well.  Pt wanted to have his bandage changed because there was some blood on it.

## 2021-04-22 NOTE — REASON FOR VISIT
[Post Op: _________] : a [unfilled] post op visit [Spouse] : spouse [FreeTextEntry1] : DOS 4/21/21 s/p closure forehead lesion

## 2021-04-22 NOTE — PHYSICAL EXAM
[NI] : Normal [de-identified] : Forehead incision clean dry and intact no active bleeding no hematoma no fluid collections

## 2021-04-22 NOTE — PHYSICAL EXAM
[NI] : Normal [de-identified] : Forehead incision clean dry and intact no active bleeding no hematoma no fluid collections

## 2021-04-22 NOTE — REVIEW OF SYSTEMS
[Fever] : no fever [Chills] : no chills [Negative] : Respiratory [de-identified] : Forehead dressing in place

## 2021-04-26 ENCOUNTER — APPOINTMENT (OUTPATIENT)
Dept: PLASTIC SURGERY | Facility: CLINIC | Age: 76
End: 2021-04-26
Payer: MEDICARE

## 2021-04-26 VITALS
BODY MASS INDEX: 35.21 KG/M2 | SYSTOLIC BLOOD PRESSURE: 120 MMHG | TEMPERATURE: 98 F | HEART RATE: 57 BPM | WEIGHT: 260 LBS | OXYGEN SATURATION: 97 % | HEIGHT: 72 IN | DIASTOLIC BLOOD PRESSURE: 79 MMHG

## 2021-04-26 LAB — SURGICAL PATHOLOGY STUDY: SIGNIFICANT CHANGE UP

## 2021-04-26 PROCEDURE — 99024 POSTOP FOLLOW-UP VISIT: CPT

## 2021-05-03 ENCOUNTER — APPOINTMENT (OUTPATIENT)
Dept: PLASTIC SURGERY | Facility: CLINIC | Age: 76
End: 2021-05-03
Payer: MEDICARE

## 2021-05-03 VITALS
DIASTOLIC BLOOD PRESSURE: 75 MMHG | HEIGHT: 72 IN | TEMPERATURE: 98.7 F | HEART RATE: 60 BPM | SYSTOLIC BLOOD PRESSURE: 122 MMHG | BODY MASS INDEX: 35.21 KG/M2 | WEIGHT: 260 LBS | OXYGEN SATURATION: 97 %

## 2021-05-03 PROCEDURE — 99024 POSTOP FOLLOW-UP VISIT: CPT

## 2021-05-04 NOTE — REVIEW OF SYSTEMS
[Fever] : no fever [Chills] : no chills [Negative] : Respiratory [de-identified] : Forehead dressing in place

## 2021-05-04 NOTE — REASON FOR VISIT
[Post Op: _________] : a [unfilled] post op visit [Spouse] : spouse [FreeTextEntry1] :  DOS 04/21/21 s/p closure of forehead lesion. Pt doing well.

## 2021-05-04 NOTE — PHYSICAL EXAM
[NI] : Normal [de-identified] : Forehead incision clean dry and intact slight dehiscence no bleeding no hematoma \par no fluid collections.  Dc'd two stitches in open area

## 2021-05-09 NOTE — REASON FOR VISIT
[Post Op: _________] : a [unfilled] post op visit [FreeTextEntry1] : s/p closure of forehead lesion. Pt doing well.

## 2021-05-09 NOTE — PHYSICAL EXAM
[NI] : Normal [de-identified] : Forehead incision clean dry and intact slight dehiscence no bleeding no hematoma no fluid collections

## 2021-05-09 NOTE — REVIEW OF SYSTEMS
[Negative] : Respiratory [Fever] : no fever [Chills] : no chills [de-identified] : Forehead dressing in place

## 2021-05-10 ENCOUNTER — APPOINTMENT (OUTPATIENT)
Dept: PLASTIC SURGERY | Facility: CLINIC | Age: 76
End: 2021-05-10
Payer: MEDICARE

## 2021-05-10 ENCOUNTER — APPOINTMENT (OUTPATIENT)
Dept: SURGICAL ONCOLOGY | Facility: CLINIC | Age: 76
End: 2021-05-10
Payer: MEDICARE

## 2021-05-10 VITALS
BODY MASS INDEX: 35.21 KG/M2 | RESPIRATION RATE: 15 BRPM | HEART RATE: 62 BPM | DIASTOLIC BLOOD PRESSURE: 83 MMHG | WEIGHT: 260 LBS | HEIGHT: 72 IN | OXYGEN SATURATION: 98 % | SYSTOLIC BLOOD PRESSURE: 134 MMHG

## 2021-05-10 DIAGNOSIS — C44.91 BASAL CELL CARCINOMA OF SKIN, UNSPECIFIED: ICD-10-CM

## 2021-05-10 PROCEDURE — 99024 POSTOP FOLLOW-UP VISIT: CPT

## 2021-05-10 RX ORDER — LOSARTAN POTASSIUM 50 MG/1
50 TABLET, FILM COATED ORAL
Qty: 90 | Refills: 0 | Status: ACTIVE | COMMUNITY
Start: 2020-11-29

## 2021-05-10 NOTE — REASON FOR VISIT
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

## 2021-05-10 NOTE — PHYSICAL EXAM
[Normal] : well developed, well nourished, in no acute distress [de-identified] : healing forehead incision site with blanchable hyperemia; no discharge

## 2021-05-10 NOTE — ASSESSMENT
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

## 2021-05-10 NOTE — HISTORY OF PRESENT ILLNESS
[de-identified] : 75 year-old male presents for an initial post operative visit. 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 Final pathology revealed a diagnosis of scar secondary to previous procedure with no residual melanocytic lesion, basal cell carcinoma, superficial type, incidental, margins negative, actinic keratosis. Closure by Dr. Tay.  Today he is with c/o mild soreness at the healing incision site. Denies fever or chills. Doing well. \par \par \par PERTINENT HISTORY:\par  He noticed a few month history of a small red dot on the skin of his forehead.  He noticed associated skin scaling which waxed and waned.  On 3/5/21 he sought care with his dermatologist Dr. Buck Duncan and underwent a shave biopsy of the lesion.  Pathology was consistent with melanoma in situ extending to the side margins and tissue base.\par \par He has no history of skin cancer until now.  His past medical history includes hypertension, hyperlipidemia, BAUTISTA (on CPAP), thyroid cancer (s/p total thyroidectomy), prostate cancer (s/p brachytherapy), DVT 2013 (following a traumatic fall).  He takes baby aspirin for prophylaxis.  He has a family history of prostate cancer involving his father at 85 and colon cancer involving his mother at 85.  He denies tobacco use and drinks alcohol 3 times per week.\par \par He denies any additional new or changing skin lesions or constitutional symptoms.\par \par Referring MD: Dr. Buck Duncan\par PCP: Dr. Bj Garcia

## 2021-05-10 NOTE — CONSULT LETTER
[Dear  ___] : Dear  [unfilled], [Courtesy Letter:] : I had the pleasure of seeing your patient, [unfilled], in my office today. [Please see my note below.] : Please see my note below. [Consult Closing:] : Thank you very much for allowing me to participate in the care of this patient.  If you have any questions, please do not hesitate to contact me. [Sincerely,] : Sincerely, [FreeTextEntry1] : He will continue to follow-up with both of us. [FreeTextEntry3] : Terry Sepulveda MD FACS\par Chief of Surgical Oncology\par \par  [DrOsbaldo  ___] : Dr. PETIT

## 2021-05-11 NOTE — REVIEW OF SYSTEMS
[Fever] : no fever [Chills] : no chills [Negative] : Respiratory [de-identified] : Forehead dressing in place

## 2021-05-11 NOTE — PHYSICAL EXAM
[NI] : Normal [de-identified] : Forehead incision clean dry and intact slight dehiscence no bleeding no hematoma \par no fluid collections.  Remaining stitches removed

## 2021-05-22 PROCEDURE — 93296 REM INTERROG EVL PM/IDS: CPT | Performed by: INTERNAL MEDICINE

## 2021-05-22 PROCEDURE — 93294 REM INTERROG EVL PM/LDLS PM: CPT | Performed by: INTERNAL MEDICINE

## 2021-05-28 ENCOUNTER — NON-APPOINTMENT (OUTPATIENT)
Age: 76
End: 2021-05-28

## 2021-06-01 DIAGNOSIS — E78.5 HYPERLIPIDEMIA LDL GOAL <100: ICD-10-CM

## 2021-06-02 RX ORDER — ATORVASTATIN CALCIUM 20 MG/1
TABLET, FILM COATED ORAL
Qty: 90 TABLET | Refills: 3 | Status: SHIPPED | OUTPATIENT
Start: 2021-06-02 | End: 2021-10-18 | Stop reason: SDUPTHER

## 2021-06-03 ENCOUNTER — APPOINTMENT (OUTPATIENT)
Dept: PLASTIC SURGERY | Facility: CLINIC | Age: 76
End: 2021-06-03
Payer: MEDICARE

## 2021-06-03 PROCEDURE — 99024 POSTOP FOLLOW-UP VISIT: CPT

## 2021-06-03 NOTE — REVIEW OF SYSTEMS
[Fever] : no fever [Chills] : no chills [Negative] : Respiratory [de-identified] : Forehead bandaid in place

## 2021-06-03 NOTE — PHYSICAL EXAM
[NI] : Normal [de-identified] : Forehead incision healing well almost fully healed.  Small pinpoint area almost closed.  No sign of infection no erythema no drainage

## 2021-07-02 ENCOUNTER — NON-APPOINTMENT (OUTPATIENT)
Age: 76
End: 2021-07-02

## 2021-07-02 ENCOUNTER — APPOINTMENT (OUTPATIENT)
Dept: VASCULAR SURGERY | Facility: CLINIC | Age: 76
End: 2021-07-02
Payer: MEDICARE

## 2021-07-02 VITALS
WEIGHT: 256 LBS | HEIGHT: 72 IN | HEART RATE: 61 BPM | TEMPERATURE: 96.3 F | DIASTOLIC BLOOD PRESSURE: 79 MMHG | SYSTOLIC BLOOD PRESSURE: 132 MMHG | BODY MASS INDEX: 34.67 KG/M2

## 2021-07-02 PROCEDURE — 93970 EXTREMITY STUDY: CPT

## 2021-07-02 PROCEDURE — 99203 OFFICE O/P NEW LOW 30 MIN: CPT

## 2021-07-12 RX ORDER — LISINOPRIL 10 MG/1
TABLET ORAL
Qty: 90 TABLET | Refills: 1 | Status: SHIPPED | OUTPATIENT
Start: 2021-07-12 | End: 2021-10-18 | Stop reason: SDUPTHER

## 2021-07-27 ENCOUNTER — NON-APPOINTMENT (OUTPATIENT)
Age: 76
End: 2021-07-27

## 2021-07-30 DIAGNOSIS — I48.0 PAROXYSMAL ATRIAL FIBRILLATION (HCC): ICD-10-CM

## 2021-07-30 RX ORDER — FLECAINIDE ACETATE 150 MG/1
TABLET ORAL
Qty: 90 TABLET | Refills: 1 | Status: SHIPPED | OUTPATIENT
Start: 2021-07-30 | End: 2021-10-18 | Stop reason: SDUPTHER

## 2021-08-12 ENCOUNTER — APPOINTMENT (OUTPATIENT)
Dept: SURGICAL ONCOLOGY | Facility: CLINIC | Age: 76
End: 2021-08-12
Payer: MEDICARE

## 2021-08-12 VITALS
OXYGEN SATURATION: 95 % | DIASTOLIC BLOOD PRESSURE: 80 MMHG | HEART RATE: 83 BPM | WEIGHT: 255 LBS | HEIGHT: 72 IN | SYSTOLIC BLOOD PRESSURE: 125 MMHG | BODY MASS INDEX: 34.54 KG/M2 | RESPIRATION RATE: 16 BRPM

## 2021-08-12 PROCEDURE — 99214 OFFICE O/P EST MOD 30 MIN: CPT

## 2021-08-12 NOTE — ASSESSMENT
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

## 2021-08-12 NOTE — HISTORY OF PRESENT ILLNESS
[de-identified] :  Rommel Hernandez is a 75 year-old male presents for a follow up visit. 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 Final pathology revealed a diagnosis of scar secondary to previous procedure with no residual melanocytic lesion, basal cell carcinoma, superficial type, incidental, margins negative, actinic keratosis. Closure by Dr. Tay.  Today he is with c/o mild soreness at the healing incision site. Denies fever or chills. Doing well. \par \par \par PERTINENT HISTORY:\par  He noticed a few month history of a small red dot on the skin of his forehead.  He noticed associated skin scaling which waxed and waned.  On 3/5/21 he sought care with his dermatologist Dr. Buck Duncan and underwent a shave biopsy of the lesion.  Pathology was consistent with melanoma in situ extending to the side margins and tissue base.\par \par He has no history of skin cancer until now.  His past medical history includes hypertension, hyperlipidemia, BAUTISTA (on CPAP), thyroid cancer (s/p total thyroidectomy), prostate cancer (s/p brachytherapy), DVT 2013 (following a traumatic fall).  He takes baby aspirin for prophylaxis.  He has a family history of prostate cancer involving his father at 85 and colon cancer involving his mother at 85.  He denies tobacco use and drinks alcohol 3 times per week.\par \par He denies any additional new or changing skin lesions or constitutional symptoms.\par \par Referring MD: Dr. Buck Duncan\par PCP: Dr. Bj Garcia

## 2021-08-12 NOTE — PHYSICAL EXAM
[Normal] : supple, no neck mass and thyroid not enlarged [Normal Neck Lymph Nodes] : normal neck lymph nodes  [Normal Supraclavicular Lymph Nodes] : normal supraclavicular lymph nodes [Normal] : oriented to person, place and time, with appropriate affect [de-identified] : Normal parotid nodes [de-identified] : forehead site clear

## 2021-08-21 PROCEDURE — 93294 REM INTERROG EVL PM/LDLS PM: CPT | Performed by: INTERNAL MEDICINE

## 2021-08-21 PROCEDURE — 93296 REM INTERROG EVL PM/IDS: CPT | Performed by: INTERNAL MEDICINE

## 2021-10-12 ENCOUNTER — APPOINTMENT (OUTPATIENT)
Dept: PULMONOLOGY | Facility: CLINIC | Age: 76
End: 2021-10-12
Payer: MEDICARE

## 2021-10-12 PROCEDURE — 99213 OFFICE O/P EST LOW 20 MIN: CPT | Mod: 95

## 2021-10-12 NOTE — ASSESSMENT
[FreeTextEntry1] : 75 year old ADRIANA BRIAN with history of HTN, HLD, DVT, thyroid CA, prostate CA, melanoma in situ; continues to derive benefit from PAP therapy for obstructive sleep apnea since 1999.\par \par SEVERE BAUTISTA on CPAP 8 cmH2O using a nasal mask.\par \par ·	Patient experiences clinical and subjective benefit from therapy: \par ·	Prior daytime somnolence, nocturnal gasping, non-restorative sleep, and heavy snoring have resolved.   \par ·	AHI reduced from 37.7 to 17.7\par ·	Seffner Sleepiness Scale score of 5 today.  \par ·	PAP Compliance data: 100% of days averaging 5 hours 43 minutes.  \par ·	Therapy data:  Residual AHI 17.7 on CPAP 8 cmH2O.  Excessive mask leak.    \par \par [facility]ina recall of 9/9/2019 DreamStation AutoCPAP\par ·	Patient with severe degree of sleep apnea, excessive daytime sleepiness before therapy, benefitting from therapy, not experiencing any adverse effect on PAP therapy.  \par ·	Discussed potential health risk related to ingestion / inhalation of the sound-abatement foam used, and risk-benefit of continued versus discontinued therapy until it is replaced.   \par ·	Advised patient to continue following device instructions for approved cleaning methods, and avoid ozone-based PAP sanitizers.  \par \par PLAN:\par ·	Patient opts to continue use of recall device until it is replaced.\par ·	Renewal of supplies from [employer] to continue therapy.\par ·	Patient will consider an in-office mask fitting with [facility] to address excessive mask leak.\par ·	He will research mask options on-line first.\par ·	Follow-up in one year or sooner if needed.\par \par \par \par

## 2021-10-12 NOTE — HISTORY OF PRESENT ILLNESS
[Other Location: e.g. Home (Enter Location, City,State)___] : at [unfilled] [Obstructive Sleep Apnea] : obstructive sleep apnea [AHI: ___ per hour] : Apnea-hypopnea index:  [unfilled] per hour [T90%: ___] : T90%: [unfilled]% [Phani desatuation%: ___] : Phani desaturation:  [unfilled]% [Home] : at home, [unfilled] , at the time of the visit. [Verbal consent obtained from patient] : the patient, [unfilled] [Unintentional Sleep While Inactive] : unintentional sleep while inactive [Date: ___] : the most recent therapeutic polysomnogram was completed [unfilled] [CPAP: ___ cmH2O] : CPAP: [unfilled] cmH2O [FreeTextEntry1] : 75 year old ADRIANA BRIAN  with obstructive sleep apnea on PAP therapy since , presents for annual visit, reporting recall of his PAP machine.\par \par COMORBID:  HTN, HLD, 2013 DVT, thyroid CA (thyroidectomy), prostate CA (brachytherapy), melanoma in situ forehead (radical resection).\par \par SEVERE BAUTISTA on CPAP 8 cmH2O using a nasal mask.\par  \par ·	 apnea-related signs + symptoms: hypersomnolence, nocturnal gasping, non-restorative sleep, heavy snoring; crowded oropharynx, 254-lb weight.\par ·	Diagnostic AHI: 37.7.  Pressure of 8 cmH2O based on  in-lab titration at 275-lbs weight.\par ·	Therapy: Recalled DreamStation AutoCPAP set-up 19 by Adalberto    \par ·	PAP Compliance data: 100% of days averaging 5 hours 43 minutes.  \par ·	Therapy data:  Residual AHI 17.7 on CPAP 8 cmH2O.  Excessive mask leak 36 minute average \par ·	Patient reports CPAP use "every night" with inability to sleep without it, improves sleep.\par ·	He registered the recalled device for replacement.\par \par Patient reports he has to tighten his mask for a good seal, but he is comfortable with it.  He continues to experience a runny nose upon removing the mask in the morning.  Current stated weight at 250-lbs.\par  \par SLEEP:   \par Unchanged.  Sleeps 5.5 to 6-hours from 11pm-5:30am  Falls asleep in 20 minutes.  0-1 brief awakening at 2:30am for unclear reasons.  Occasional 20-minute nap after lunch.  Occasional short doze in the evening watching TV.\par \par OCCUPATION:  Retired.  Woodworking hobby.\par \par EPWORTH SLEEPINESS SCALE:  \par \par How likely are you to doze off or fall asleep in the situations described below, in contrast \par to feeling just tired?  This refers to your usual way of life in recent times.  Even if you haven't done some of these things recently, try to work out how they would have affected you.  Use the following scale to choose one most appropriate number for each situation.......Chance of dozin= never. 1= slight. 2= moderate. 3= high.\par \par CHANCE OF DOZING............SITUATION\par 0.............................................Sitting and reading\par 2.............................................Watching TV\par 0.............................................Sitting inactive in a public place (eg a theatre or a meeting)\par 0.............................................As a passenger in a car for an hour without a break\par 1.............................................Lying down to rest in the afternoon when circumstances permit\par 0.............................................Sitting and talking to someone\par 2.............................................Sitting quietly after lunch without alcohol\par 0.............................................In a car, while stopped for a few minutes in traffic\par \par 5............................................TOTAL ESS SCORE [Snoring] : no snoring [Witnessed Apneas] : no witnessed sleep apnea [Frequent Nocturnal Awakening] : no nocturnal awakening [Unintentional Sleep while Active] : no unintentional sleep while active [Awakes Unrefreshed] : does not awake unrefreshed [Awakes with Headache] : no headache upon awakening [Awakening With Dry Mouth] : no dry mouth upon awakening [Recent  Weight Gain] : no recent weight gain [Daytime Somnolence] : no daytime somnolence [DIS] : no DIS [DMS] : no DMS [Unusual Sleep Behavior] : no unusual sleep behavior [Lower Extremity Discomfort] : no lower extremity discomfort in evening or at bedtime [Nocturnal Oxygen] : The patient does not use nocturnal oxygen [ESS] : 5

## 2021-10-12 NOTE — REVIEW OF SYSTEMS
[Obesity] : obesity [Negative] : Neurologic [Fatigue] : no fatigue [Shortness Of Breath] : no shortness of breath [Chest Pain] : no chest pain [Palpitations] : no palpitations [Edema] : ~T edema was not present [Diabetes] : no diabetes  [Anemia] : no anemia [History of Iron Deficiency] : no history of iron deficiency [Heartburn] : no heartburn [Nocturia] : no nocturia [Arthralgias] : no arthralgias [Depression] : no depression [Anxious] : not anxious [FreeTextEntry8] : runny nose in the morning when he removes the mask.  Allergy shots.

## 2021-10-17 DIAGNOSIS — I48.0 PAROXYSMAL ATRIAL FIBRILLATION (HCC): ICD-10-CM

## 2021-10-18 PROBLEM — I45.89 HIS-PURKINJE DYSFUNCTION: Status: RESOLVED | Noted: 2021-04-12 | Resolved: 2021-10-18

## 2021-10-18 PROBLEM — E78.5 HYPERLIPIDEMIA LDL GOAL <100: Chronic | Status: ACTIVE | Noted: 2017-01-24

## 2021-10-18 PROBLEM — R55 SYNCOPE, CARDIOGENIC: Chronic | Status: ACTIVE | Noted: 2021-01-06

## 2021-10-18 RX ORDER — APIXABAN 5 MG/1
TABLET, FILM COATED ORAL
Qty: 180 TABLET | Refills: 1 | Status: SHIPPED | OUTPATIENT
Start: 2021-10-18 | End: 2021-10-18 | Stop reason: SDUPTHER
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
## 2021-10-19 ENCOUNTER — OFFICE VISIT (OUTPATIENT)
Dept: CARDIOLOGY | Facility: CLINIC | Age: 76
End: 2021-10-19

## 2021-10-19 VITALS
OXYGEN SATURATION: 98 % | WEIGHT: 151.8 LBS | SYSTOLIC BLOOD PRESSURE: 120 MMHG | BODY MASS INDEX: 23.01 KG/M2 | HEIGHT: 68 IN | DIASTOLIC BLOOD PRESSURE: 62 MMHG

## 2021-10-19 DIAGNOSIS — I48.0 PAROXYSMAL ATRIAL FIBRILLATION (HCC): ICD-10-CM

## 2021-10-19 DIAGNOSIS — R55 SYNCOPE, CARDIOGENIC: Chronic | ICD-10-CM

## 2021-10-19 DIAGNOSIS — N52.9 ERECTILE DYSFUNCTION, UNSPECIFIED ERECTILE DYSFUNCTION TYPE: ICD-10-CM

## 2021-10-19 DIAGNOSIS — I10 ESSENTIAL HYPERTENSION: Primary | ICD-10-CM

## 2021-10-19 DIAGNOSIS — E78.5 HYPERLIPIDEMIA LDL GOAL <100: ICD-10-CM

## 2021-10-19 PROCEDURE — 99214 OFFICE O/P EST MOD 30 MIN: CPT | Performed by: INTERNAL MEDICINE

## 2021-10-19 PROCEDURE — 93000 ELECTROCARDIOGRAM COMPLETE: CPT | Performed by: INTERNAL MEDICINE

## 2021-10-19 RX ORDER — SILDENAFIL 100 MG/1
100 TABLET, FILM COATED ORAL DAILY PRN
Qty: 30 TABLET | Refills: 5 | Status: SHIPPED | OUTPATIENT
Start: 2021-10-19

## 2021-10-19 RX ORDER — FLECAINIDE ACETATE 150 MG/1
75 TABLET ORAL 2 TIMES DAILY
Qty: 90 TABLET | Refills: 1 | Status: SHIPPED | OUTPATIENT
Start: 2021-10-19 | End: 2022-04-18 | Stop reason: SDUPTHER

## 2021-10-19 RX ORDER — LISINOPRIL 10 MG/1
10 TABLET ORAL DAILY
Qty: 90 TABLET | Refills: 1
Start: 2021-10-19 | End: 2022-01-11

## 2021-10-19 RX ORDER — ATORVASTATIN CALCIUM 20 MG/1
20 TABLET, FILM COATED ORAL NIGHTLY
Qty: 90 TABLET | Refills: 3 | Status: SHIPPED | OUTPATIENT
Start: 2021-10-19 | End: 2022-06-06

## 2021-10-19 NOTE — ASSESSMENT & PLAN NOTE
· Asymptomatic  · Last interrogation did not show atrial fibrillation  · Continue flecainide for rhythm control strategy  · Continue apixaban for stroke prophylaxis

## 2021-11-09 ENCOUNTER — TELEPHONE (OUTPATIENT)
Dept: CARDIOLOGY | Facility: CLINIC | Age: 76
End: 2021-11-09

## 2021-11-09 NOTE — TELEPHONE ENCOUNTER
Called patient and left a message letting him know that his pacemaker reading is due tomorrow. Left my name and number stating that if he needed help sending in the reading or if he has any concerns to call me.

## 2021-11-16 ENCOUNTER — APPOINTMENT (OUTPATIENT)
Dept: MRI IMAGING | Facility: IMAGING CENTER | Age: 76
End: 2021-11-16
Payer: MEDICARE

## 2021-11-16 ENCOUNTER — OUTPATIENT (OUTPATIENT)
Dept: OUTPATIENT SERVICES | Facility: HOSPITAL | Age: 76
LOS: 1 days | End: 2021-11-16
Payer: MEDICARE

## 2021-11-16 ENCOUNTER — TRANSCRIPTION ENCOUNTER (OUTPATIENT)
Age: 76
End: 2021-11-16

## 2021-11-16 DIAGNOSIS — K86.2 CYST OF PANCREAS: ICD-10-CM

## 2021-11-16 DIAGNOSIS — Z98.890 OTHER SPECIFIED POSTPROCEDURAL STATES: Chronic | ICD-10-CM

## 2021-11-16 PROCEDURE — A9585: CPT

## 2021-11-16 PROCEDURE — 74183 MRI ABD W/O CNTR FLWD CNTR: CPT | Mod: 26,MH

## 2021-11-16 PROCEDURE — 74183 MRI ABD W/O CNTR FLWD CNTR: CPT | Mod: MH

## 2021-11-20 PROCEDURE — 93296 REM INTERROG EVL PM/IDS: CPT | Performed by: INTERNAL MEDICINE

## 2021-11-20 PROCEDURE — 93294 REM INTERROG EVL PM/LDLS PM: CPT | Performed by: INTERNAL MEDICINE

## 2022-01-11 DIAGNOSIS — I10 ESSENTIAL HYPERTENSION: ICD-10-CM

## 2022-01-11 RX ORDER — LISINOPRIL 10 MG/1
TABLET ORAL
Qty: 90 TABLET | Refills: 0 | Status: SHIPPED | OUTPATIENT
Start: 2022-01-11 | End: 2022-04-18 | Stop reason: SDUPTHER

## 2022-02-17 ENCOUNTER — APPOINTMENT (OUTPATIENT)
Dept: SURGICAL ONCOLOGY | Facility: CLINIC | Age: 77
End: 2022-02-17
Payer: MEDICARE

## 2022-02-17 VITALS
RESPIRATION RATE: 16 BRPM | WEIGHT: 266 LBS | OXYGEN SATURATION: 96 % | SYSTOLIC BLOOD PRESSURE: 130 MMHG | BODY MASS INDEX: 36.03 KG/M2 | HEIGHT: 72 IN | DIASTOLIC BLOOD PRESSURE: 79 MMHG | HEART RATE: 60 BPM | TEMPERATURE: 97.9 F

## 2022-02-17 PROCEDURE — 99214 OFFICE O/P EST MOD 30 MIN: CPT

## 2022-02-17 NOTE — PHYSICAL EXAM
[Normal] : supple, no neck mass and thyroid not enlarged [Normal Neck Lymph Nodes] : normal neck lymph nodes  [Normal Supraclavicular Lymph Nodes] : normal supraclavicular lymph nodes [Normal] : oriented to person, place and time, with appropriate affect [de-identified] : normal parotid nodes [de-identified] : forehead excision site clear

## 2022-02-17 NOTE — ASSESSMENT
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

## 2022-02-17 NOTE — HISTORY OF PRESENT ILLNESS
[de-identified] :  Rommel Hernandez is a 76 year-old male presents for a follow up visit. 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 Final pathology revealed a diagnosis of scar secondary to previous procedure with no residual melanocytic lesion, basal cell carcinoma, superficial type, incidental, margins negative, actinic keratosis. Closure by Dr. Tay.  Today he is with c/o mild soreness at the healing incision site. Denies fever or chills. Doing well. \par \par \par PERTINENT HISTORY:\par  He noticed a few month history of a small red dot on the skin of his forehead.  He noticed associated skin scaling which waxed and waned.  On 3/5/21 he sought care with his dermatologist Dr. Buck Duncan and underwent a shave biopsy of the lesion.  Pathology was consistent with melanoma in situ extending to the side margins and tissue base.\par \par He has no history of skin cancer until now.  His past medical history includes hypertension, hyperlipidemia, BAUTISTA (on CPAP), thyroid cancer (s/p total thyroidectomy), prostate cancer (s/p brachytherapy), DVT 2013 (following a traumatic fall).  He takes baby aspirin for prophylaxis.  He has a family history of prostate cancer involving his father at 85 and colon cancer involving his mother at 85.  He denies tobacco use and drinks alcohol 3 times per week.\par \par He denies any additional new or changing skin lesions or constitutional symptoms.\par \par Referring MD: Dr. Buck Duncan\par PCP: Dr. Bj Garcia

## 2022-02-19 PROCEDURE — 93296 REM INTERROG EVL PM/IDS: CPT | Performed by: INTERNAL MEDICINE

## 2022-02-19 PROCEDURE — 93294 REM INTERROG EVL PM/LDLS PM: CPT | Performed by: INTERNAL MEDICINE

## 2022-04-18 ENCOUNTER — OFFICE VISIT (OUTPATIENT)
Dept: CARDIOLOGY | Facility: CLINIC | Age: 77
End: 2022-04-18

## 2022-04-18 VITALS
OXYGEN SATURATION: 96 % | DIASTOLIC BLOOD PRESSURE: 70 MMHG | SYSTOLIC BLOOD PRESSURE: 134 MMHG | HEART RATE: 61 BPM | WEIGHT: 153 LBS | HEIGHT: 68 IN | BODY MASS INDEX: 23.19 KG/M2

## 2022-04-18 DIAGNOSIS — I48.0 PAROXYSMAL ATRIAL FIBRILLATION: Primary | Chronic | ICD-10-CM

## 2022-04-18 DIAGNOSIS — I10 ESSENTIAL HYPERTENSION: Chronic | ICD-10-CM

## 2022-04-18 DIAGNOSIS — Z95.0 PRESENCE OF CARDIAC PACEMAKER: ICD-10-CM

## 2022-04-18 DIAGNOSIS — I45.89 HIS-PURKINJE DYSFUNCTION: ICD-10-CM

## 2022-04-18 PROCEDURE — 93279 PRGRMG DEV EVAL PM/LDLS PM: CPT | Performed by: PHYSICIAN ASSISTANT

## 2022-04-18 PROCEDURE — 93000 ELECTROCARDIOGRAM COMPLETE: CPT | Performed by: PHYSICIAN ASSISTANT

## 2022-04-18 PROCEDURE — 99213 OFFICE O/P EST LOW 20 MIN: CPT | Performed by: PHYSICIAN ASSISTANT

## 2022-04-18 RX ORDER — LISINOPRIL 10 MG/1
10 TABLET ORAL DAILY
Qty: 90 TABLET | Refills: 3 | Status: SHIPPED | OUTPATIENT
Start: 2022-04-18

## 2022-04-18 RX ORDER — TAMSULOSIN HYDROCHLORIDE 0.4 MG/1
1 CAPSULE ORAL DAILY
COMMUNITY
End: 2022-04-18 | Stop reason: SDDI

## 2022-04-18 RX ORDER — FLECAINIDE ACETATE 150 MG/1
75 TABLET ORAL 2 TIMES DAILY
Qty: 90 TABLET | Refills: 3 | Status: SHIPPED | OUTPATIENT
Start: 2022-04-18 | End: 2022-10-19 | Stop reason: SDUPTHER
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
## 2022-04-19 DIAGNOSIS — I48.0 PAROXYSMAL ATRIAL FIBRILLATION: ICD-10-CM

## 2022-04-19 RX ORDER — APIXABAN 5 MG/1
TABLET, FILM COATED ORAL
Qty: 180 TABLET | Refills: 3 | Status: SHIPPED | OUTPATIENT
Start: 2022-04-19 | End: 2022-10-19 | Stop reason: SDUPTHER

## 2022-05-11 ENCOUNTER — TELEPHONE (OUTPATIENT)
Dept: CARDIOLOGY | Facility: CLINIC | Age: 77
End: 2022-05-11

## 2022-05-21 PROCEDURE — 93296 REM INTERROG EVL PM/IDS: CPT | Performed by: INTERNAL MEDICINE

## 2022-05-21 PROCEDURE — 93294 REM INTERROG EVL PM/LDLS PM: CPT | Performed by: INTERNAL MEDICINE

## 2022-06-05 DIAGNOSIS — E78.5 HYPERLIPIDEMIA LDL GOAL <100: ICD-10-CM

## 2022-06-06 RX ORDER — ATORVASTATIN CALCIUM 20 MG/1
TABLET, FILM COATED ORAL
Qty: 90 TABLET | Refills: 0 | Status: SHIPPED | OUTPATIENT
Start: 2022-06-06 | End: 2022-09-09

## 2022-07-08 ENCOUNTER — APPOINTMENT (OUTPATIENT)
Dept: VASCULAR SURGERY | Facility: CLINIC | Age: 77
End: 2022-07-08

## 2022-07-08 VITALS — SYSTOLIC BLOOD PRESSURE: 127 MMHG | DIASTOLIC BLOOD PRESSURE: 80 MMHG | HEART RATE: 59 BPM

## 2022-07-08 VITALS
HEART RATE: 80 BPM | BODY MASS INDEX: 33.4 KG/M2 | TEMPERATURE: 98.3 F | DIASTOLIC BLOOD PRESSURE: 84 MMHG | WEIGHT: 252 LBS | HEIGHT: 73 IN | SYSTOLIC BLOOD PRESSURE: 136 MMHG

## 2022-07-08 PROCEDURE — 99212 OFFICE O/P EST SF 10 MIN: CPT

## 2022-07-08 PROCEDURE — 93971 EXTREMITY STUDY: CPT

## 2022-07-14 ENCOUNTER — NON-APPOINTMENT (OUTPATIENT)
Age: 77
End: 2022-07-14

## 2022-07-20 ENCOUNTER — APPOINTMENT (OUTPATIENT)
Dept: ORTHOPEDIC SURGERY | Facility: CLINIC | Age: 77
End: 2022-07-20

## 2022-07-20 VITALS — WEIGHT: 251 LBS | HEIGHT: 72 IN | BODY MASS INDEX: 34 KG/M2

## 2022-07-20 DIAGNOSIS — D17.21 BENIGN LIPOMATOUS NEOPLASM OF SKIN AND SUBCUTANEOUS TISSUE OF RIGHT ARM: ICD-10-CM

## 2022-07-20 DIAGNOSIS — Z98.890 OTHER SPECIFIED POSTPROCEDURAL STATES: ICD-10-CM

## 2022-07-20 DIAGNOSIS — M50.90 CERVICAL DISC DISORDER, UNSPECIFIED, UNSPECIFIED CERVICAL REGION: ICD-10-CM

## 2022-07-20 DIAGNOSIS — M47.812 SPONDYLOSIS W/OUT MYELOPATHY OR RADICULOPATHY, CERVICAL REGION: ICD-10-CM

## 2022-07-20 PROCEDURE — 99203 OFFICE O/P NEW LOW 30 MIN: CPT

## 2022-07-20 PROCEDURE — 72040 X-RAY EXAM NECK SPINE 2-3 VW: CPT

## 2022-07-20 PROCEDURE — 73030 X-RAY EXAM OF SHOULDER: CPT | Mod: RT

## 2022-07-20 NOTE — PHYSICAL EXAM
[Normal] : Gait: normal [Bicep] : biceps 2+ and symmetric bilaterally [B.R.] : biceps 2+ and symmetric bilaterally [Tricep] : triceps 2+ and symmetric bilaterally [Rad] : radial 2+ and symmetric bilaterally [de-identified] : Mild restriction of active head and neck extension and rotation.  Small subcutaneous lipoma above the right trapezius.  Healed surgical scar right shoulder.  No warmth or swelling right shoulder.  Right shoulder active motion–155 degrees forward elevation, 100 degrees abduction, internal rotation hand to upper lumbar region.  He can forward elevate, abduct and rotate the right shoulder and arm against manual resistance.  Bilateral elbow, wrist and hand strength intact with manual muscle testing.  Sensation intact to light touch both upper extremities. [de-identified] : \par \par X-rays cervical spine AP and lateral views demonstrate spondylosis/degenerative disc disease changes C4-5, C5-6 and C6-7.  \par X-rays right shoulder AP, scapular Y and axillary views demonstrate 5 metal suture anchors embedded in the greater and lesser tuberosities from previous rotator cuff surgery.  Humeral head is centered.  No significant arthritic changes.

## 2022-07-20 NOTE — DISCUSSION/SUMMARY
[de-identified] : Discussion had with the patient.  Findings suggestive for cervical disc disease/arthritis.  Previous history of right shoulder surgery.  Rotator cuff grossly intact with muscle strength testing today.  Small subcutaneous lipoma noted which clinically is benign.  Recommend medical management of symptoms at this time with over-the-counter NSAIDs–ibuprofen or Aleve.  Additionally physical therapy prescribed to include light cervical traction to alleviate pain.  Continue to monitor.  Follow-up in 3 months if symptoms persist.

## 2022-07-20 NOTE — HISTORY OF PRESENT ILLNESS
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 He states for a few years he had been having the same issue in the Left arm  that has recently spontaneously resolved. He has not had prior evaluation for his arm pain. He states the pain is present at rest and he can move his shoulder relatively pain free. He states the pain is worsened holding his arm extended when driving and sleeping on his Right side. He has recently started having tingling in the tips of his fingers of the right hand over the past 5 days. He has been taking Advil with some partial relief. \par \par History of right rotator cuff repair surgery in 2013 with open biceps tenodesis\par He has HTN, deneis DM or smoking.

## 2022-08-09 ENCOUNTER — NON-APPOINTMENT (OUTPATIENT)
Age: 77
End: 2022-08-09

## 2022-08-10 ENCOUNTER — APPOINTMENT (OUTPATIENT)
Dept: VASCULAR SURGERY | Facility: CLINIC | Age: 77
End: 2022-08-10

## 2022-08-10 PROCEDURE — 93970 EXTREMITY STUDY: CPT

## 2022-08-15 ENCOUNTER — NON-APPOINTMENT (OUTPATIENT)
Age: 77
End: 2022-08-15

## 2022-08-16 ENCOUNTER — APPOINTMENT (OUTPATIENT)
Dept: VASCULAR SURGERY | Facility: CLINIC | Age: 77
End: 2022-08-16

## 2022-08-16 PROCEDURE — 99441: CPT | Mod: 95

## 2022-08-20 PROCEDURE — 93296 REM INTERROG EVL PM/IDS: CPT | Performed by: INTERNAL MEDICINE

## 2022-08-20 PROCEDURE — 93294 REM INTERROG EVL PM/LDLS PM: CPT | Performed by: INTERNAL MEDICINE

## 2022-08-22 ENCOUNTER — APPOINTMENT (OUTPATIENT)
Dept: SURGICAL ONCOLOGY | Facility: CLINIC | Age: 77
End: 2022-08-22

## 2022-08-22 VITALS
OXYGEN SATURATION: 97 % | RESPIRATION RATE: 16 BRPM | DIASTOLIC BLOOD PRESSURE: 80 MMHG | HEIGHT: 72 IN | HEART RATE: 79 BPM | TEMPERATURE: 98.6 F | SYSTOLIC BLOOD PRESSURE: 126 MMHG | BODY MASS INDEX: 33.86 KG/M2 | WEIGHT: 250 LBS

## 2022-08-22 PROCEDURE — 99214 OFFICE O/P EST MOD 30 MIN: CPT

## 2022-08-22 NOTE — ASSESSMENT
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 Duncan

## 2022-08-22 NOTE — PHYSICAL EXAM
[Normal] : supple, no neck mass and thyroid not enlarged [Normal Neck Lymph Nodes] : normal neck lymph nodes  [Normal Supraclavicular Lymph Nodes] : normal supraclavicular lymph nodes [Normal] : oriented to person, place and time, with appropriate affect [de-identified] : normal parotid nodes [de-identified] : forehead excision site clear

## 2022-09-01 ENCOUNTER — APPOINTMENT (OUTPATIENT)
Dept: PULMONOLOGY | Facility: CLINIC | Age: 77
End: 2022-09-01

## 2022-09-01 PROCEDURE — 99212 OFFICE O/P EST SF 10 MIN: CPT | Mod: 95

## 2022-09-01 NOTE — HISTORY OF PRESENT ILLNESS
[Medical Office: ([facility])___] : at the medical office located in  [Obstructive Sleep Apnea] : obstructive sleep apnea [To Bed: ___] : ~he/she~ goes to bed at [unfilled] [Arises: ___] : arises at [unfilled] [Sleep Onset Latency: ___ minutes] : sleep onset latency of [unfilled] minutes reported [Nocturnal Awakenings: ___] : ~he/she~ typically has [unfilled] nocturnal awakenings [WASO: ___] : Wake time after sleep onset is [unfilled] [TST: ___] : Total sleep time is [unfilled] [Daytime Sleep: ___] : daytime sleep: [unfilled] [AHI: ___ per hour] : Apnea-hypopnea index:  [unfilled] per hour [T90%: ___] : T90%: [unfilled]% [Phani desatuation%: ___] : Phani desaturation:  [unfilled]% [Date: ___] : the most recent therapeutic polysomnogram was completed [unfilled] [CPAP: ___ cmH2O] : CPAP: [unfilled] cmH2O [% Days used: ____] : Days used: [unfilled] % [% Days used > 4 hrs: ____] : Days used > 4 hrs: [unfilled] % [Mean nightly usage: ___ hrs] : Mean nightly usage: [unfilled] hrs [Therapy based AHI: ___ /hr] : Therapy based AHI: [unfilled] / hr [Home] : at home, [unfilled] , at the time of the visit. [Verbal consent obtained from patient] : the patient, [unfilled] [FreeTextEntry1] : 76 year old male on annual visit for obstructive sleep apnea treated with CPAP since 1999.  History of HTN, HLD, 2013 DVT, thyroid CA (thyroidectomy), prostate CA (brachytherapy), melanoma (radical resection).\par \par SEVERE BAUTISTA (RDI 37.7) on CPAP 8 cmH2O using a nasal mask.\par \par 1999\par Evaluated for hypersomnolence, nocturnal gasping, nonrestorative sleep, heavy snoring; crowded oropharynx, 254-lb weight.  Diagnostic RDI: 37.7 on PSG. \par \par 2007:  CPAP 8 cmH2O reduced AHI to WNL on in-lab titration (at 275-lbs weight).\par \par 2022\par Received  3-4 months ago, a rebuilt DreamStation Auto CPAP to replace 9/9/19 recall from [employer].  Current stated weight 244-lbs.  Sleeping 6-hours, 11:30pm-6am.  Occasional 20-minute nap after lunch.  Benefit:  can't sleep without it, wide awake in the morning, feels rested.  No interim changes in health, weight, medications. [Snoring] : no snoring [Witnessed Apneas] : no witnessed sleep apnea [Frequent Nocturnal Awakening] : no nocturnal awakening [Unintentional Sleep while Active] : no unintentional sleep while active [Unintentional Sleep While Inactive] : no unintentional sleep while inactive [Awakes Unrefreshed] : does not awake unrefreshed [Awakes with Headache] : no headache upon awakening [Awakening With Dry Mouth] : no dry mouth upon awakening [Recent  Weight Gain] : no recent weight gain [Daytime Somnolence] : no daytime somnolence [DIS] : no DIS [DMS] : no DMS [Unusual Sleep Behavior] : no unusual sleep behavior [Lower Extremity Discomfort] : no lower extremity discomfort in evening or at bedtime [Nocturnal Oxygen] : The patient does not use nocturnal oxygen

## 2022-09-01 NOTE — ASSESSMENT
[FreeTextEntry1] : 76 year old ADRIANA BRIAN continues to derive benefit from CPAP therapy for obstructive sleep apnea since 1999.\par \par ·	SEVERE BAUTISTA on CPAP 8 cmH2O using a nasal mask.\par \par Prior apnea-related symptoms have resolved.  \par Device:  [employer] refurbished DreamStation Auto CPAP replaced 9/9/19 recall\par Excellent CPAP compliance (98.9% days, 98.9% >4hrs, average 5 hrs 45 mins.), \par residual AHI 20, excessive mask leak at 38 mins.  Average 15.3% in periodic breathing.        \par \par PLAN:\par ·	Mask fitting in-office\par ·	[employer]us PAP resupply to continue therapy at current settings.\par ·	Consulted with Dr SUBRAMANIAN for residual AHI and PB: 7-day AutoTrial 8-12 cmH2O\par ·	Repeat data download\par ·	Follow-up annually or sooner if needed.\par \par \par \par \par

## 2022-09-03 ENCOUNTER — NON-APPOINTMENT (OUTPATIENT)
Age: 77
End: 2022-09-03

## 2022-09-09 DIAGNOSIS — E78.5 HYPERLIPIDEMIA LDL GOAL <100: ICD-10-CM

## 2022-09-09 RX ORDER — ATORVASTATIN CALCIUM 20 MG/1
TABLET, FILM COATED ORAL
Qty: 90 TABLET | Refills: 0 | Status: SHIPPED | OUTPATIENT
Start: 2022-09-09 | End: 2022-10-19 | Stop reason: SDUPTHER

## 2022-09-28 ENCOUNTER — APPOINTMENT (OUTPATIENT)
Dept: PULMONOLOGY | Facility: CLINIC | Age: 77
End: 2022-09-28

## 2022-09-28 VITALS
SYSTOLIC BLOOD PRESSURE: 127 MMHG | HEIGHT: 72 IN | HEART RATE: 76 BPM | RESPIRATION RATE: 16 BRPM | TEMPERATURE: 97.6 F | BODY MASS INDEX: 34.54 KG/M2 | DIASTOLIC BLOOD PRESSURE: 80 MMHG | WEIGHT: 255 LBS

## 2022-09-28 PROCEDURE — 99214 OFFICE O/P EST MOD 30 MIN: CPT

## 2022-09-28 NOTE — PROCEDURE
[FreeTextEntry1] : Patient is currently using a Resmed ComfortGel nasal mask. His primary complaint is excessive leak when he sleeps on his side. Care  data confirms some excessive leak and residual AHI. He has always used a nasal mask and is comfortable with it.\par \par Patient was fitted today with a Respironics Dreamwear Nasal mask w/ Medium frame Medium cushion and a F+P Eson 2 Large. Patient found the Eson 2 more comfortable and more familiar. He was instructed on how to don/doff and adjust both masks as well as how to connect them to his machine and switch between them. He was given both masks to try at home and will follow up in 1-2 weeks so his preferred mask can be ordered from his DME ([employer]).

## 2022-10-19 ENCOUNTER — OFFICE VISIT (OUTPATIENT)
Dept: CARDIOLOGY | Facility: CLINIC | Age: 77
End: 2022-10-19

## 2022-10-19 ENCOUNTER — NON-APPOINTMENT (OUTPATIENT)
Age: 77
End: 2022-10-19

## 2022-10-19 VITALS
WEIGHT: 148 LBS | OXYGEN SATURATION: 98 % | DIASTOLIC BLOOD PRESSURE: 70 MMHG | BODY MASS INDEX: 22.43 KG/M2 | HEART RATE: 89 BPM | HEIGHT: 68 IN | SYSTOLIC BLOOD PRESSURE: 134 MMHG

## 2022-10-19 DIAGNOSIS — E78.5 HYPERLIPIDEMIA LDL GOAL <100: ICD-10-CM

## 2022-10-19 DIAGNOSIS — I10 ESSENTIAL HYPERTENSION: Chronic | ICD-10-CM

## 2022-10-19 DIAGNOSIS — I48.0 PAROXYSMAL ATRIAL FIBRILLATION: Primary | Chronic | ICD-10-CM

## 2022-10-19 DIAGNOSIS — Z95.0 PRESENCE OF CARDIAC PACEMAKER: ICD-10-CM

## 2022-10-19 PROCEDURE — 93279 PRGRMG DEV EVAL PM/LDLS PM: CPT | Performed by: INTERNAL MEDICINE

## 2022-10-19 PROCEDURE — 93000 ELECTROCARDIOGRAM COMPLETE: CPT | Performed by: INTERNAL MEDICINE

## 2022-10-19 PROCEDURE — 99214 OFFICE O/P EST MOD 30 MIN: CPT | Performed by: INTERNAL MEDICINE

## 2022-10-19 RX ORDER — FLECAINIDE ACETATE 150 MG/1
75 TABLET ORAL 2 TIMES DAILY
Qty: 90 TABLET | Refills: 3 | Status: SHIPPED | OUTPATIENT
Start: 2022-10-19

## 2022-10-19 RX ORDER — ATORVASTATIN CALCIUM 20 MG/1
20 TABLET, FILM COATED ORAL NIGHTLY
Qty: 90 TABLET | Refills: 3 | Status: SHIPPED | OUTPATIENT
Start: 2022-10-19

## 2022-10-19 RX ORDER — TRIAMCINOLONE ACETONIDE 1 MG/G
1 CREAM TOPICAL AS NEEDED
COMMUNITY

## 2022-10-19 NOTE — PROGRESS NOTES
Cardiology Outpatient Visit      Identification: Jovon Gracia is a 76 y.o. male who resides in [place], [place].    Reason for visit:  · Paroxysmal atrial fibrillation  · Pacemaker    Assessment     Problem List Items Addressed This Visit        Cardiology Problems    Essential hypertension    Overview     • Target blood pressure <130/80 mmHg         Current Assessment & Plan     · Controlled  · Continue present medical therapy         Hyperlipidemia LDL goal <100    Overview     · Moderate intensity statin therapy indicated for primary prevention         Current Assessment & Plan     · Continue atorvastatin         Relevant Medications    atorvastatin (LIPITOR) 20 MG tablet    Paroxysmal atrial fibrillation (HCC) - Primary    Overview     · CHADS-VASc 4 (age, HTN, aortic athero, HTN)  · Echocardiogram (10/21/2014): LVEF 60%.  No valvular or wall motion abnormalities.  · Cardiolite stress test (11/6/2014): No ischemia/infarct.  Normal LVEF  · Event monitoring showing atrial fibrillation, symptomatic with palpitations and chest pain.   · Pharmacologic nuclear stress (1/7/2021): Normal perfusion.  Normal LVEF  · Echo (1/7/2021): LVEF >70%.  No significant valvular abnormality.  Negative bubble study         Current Assessment & Plan     · Asymptomatic  · Continue flecainide for rhythm control  · Continue apixaban         Relevant Medications    flecainide (TAMBOCOR) 150 MG tablet    apixaban (Eliquis) 5 MG tablet tablet    Presence of cardiac pacemaker    Overview     · Leadless pacemaker implanted 1/8/2021 for cardiogenic syncope         Current Assessment & Plan     · Normal functioning pacemaker with greater than 8 years by life remaining         Relevant Orders    ECG 12 Lead       Plan   • Continue present medical therapy      Follow-up   Return in about 6 months (around 4/19/2023) for Follow-up with cardiology APRN/PA, Device Check.        Subjective      Mr. Gracia returns to the office today.  He is doing  "well and has had no further syncopal episodes.  He does denies any palpitations related to atrial fibrillation.  He has had no TIA or stroke symptoms.  He is tolerating his present medicines without side effect.    Review of Systems   Constitutional: Negative for malaise/fatigue.   Eyes: Negative for vision loss in left eye and vision loss in right eye.   Cardiovascular: Negative.  Negative for chest pain, dyspnea on exertion, near-syncope, orthopnea, palpitations, paroxysmal nocturnal dyspnea and syncope.   Respiratory: Negative.    Musculoskeletal: Negative for myalgias.   Neurological: Negative for brief paralysis, excessive daytime sleepiness, focal weakness, numbness, paresthesias and weakness.   All other systems reviewed and are negative.      Objective     /70 (BP Location: Left arm, Patient Position: Sitting)   Pulse 89   Ht 172.7 cm (68\")   Wt 67.1 kg (148 lb)   SpO2 98%   BMI 22.50 kg/m²       Constitutional:       Appearance: Healthy appearance. Well-developed.   Eyes:      General: Lids are normal. No scleral icterus.  HENT:      Head: Normocephalic and atraumatic.   Neck:      Thyroid: No thyroid mass.      Vascular: No carotid bruit or JVD. JVD normal.   Pulmonary:      Effort: Pulmonary effort is normal.      Breath sounds: Normal breath sounds.   Cardiovascular:      Normal rate. Regular rhythm.      Murmurs: There is no murmur.      No gallop.   Musculoskeletal:      Extremities: No clubbing present.Skin:     General: Skin is warm and dry. There is no cyanosis.   Neurological:      General: No focal deficit present.      Mental Status: Alert.   Psychiatric:         Attention and Perception: Attention normal.         Behavior: Behavior normal. Behavior is cooperative.         Result Review  (reviewed with patient):    [employer]onic leadless pacemaker interrogated.  >8 years of battery life remaining.  4% ventricular pacing.      ECG 12 Lead    Date/Time: 10/19/2022 2:28 PM  Performed by: " Quinten Dial IV, MD  Authorized by: Quinten Dial IV, MD   Comparison: compared with previous ECG from 4/18/2022  Similar to previous ECG  Rhythm: sinus rhythm  Rate: normal  BPM: 79  Conduction: 1st degree AV block  QRS axis: right and left  Other findings: poor R wave progression    Clinical impression: abnormal EKG  Comments: QRS interval 90 ms  Left axis deviation and poor R wave progression related to lead placement             No recent laboratory studies available for review today.      Dennis Dial MD, [facility], [facility]  10/19/2022

## 2022-11-09 ENCOUNTER — TELEPHONE (OUTPATIENT)
Dept: CARDIOLOGY | Facility: CLINIC | Age: 77
End: 2022-11-09

## 2022-11-19 PROCEDURE — 93296 REM INTERROG EVL PM/IDS: CPT | Performed by: INTERNAL MEDICINE

## 2022-11-19 PROCEDURE — 93294 REM INTERROG EVL PM/LDLS PM: CPT | Performed by: INTERNAL MEDICINE

## 2022-12-08 ENCOUNTER — TELEPHONE (OUTPATIENT)
Dept: CARDIOLOGY | Facility: CLINIC | Age: 77
End: 2022-12-08

## 2022-12-08 NOTE — TELEPHONE ENCOUNTER
Bertha with Dr. Staples's office called to ask for cardiac clearance for an upcoming umbilical hernia repair. He is on Eliquis 5 mg BID. OK to proceed and hold Eliquis 48 hours?

## 2022-12-31 ENCOUNTER — NON-APPOINTMENT (OUTPATIENT)
Age: 77
End: 2022-12-31

## 2023-02-08 ENCOUNTER — TELEPHONE (OUTPATIENT)
Dept: CARDIOLOGY | Facility: CLINIC | Age: 78
End: 2023-02-08
Payer: MEDICARE

## 2023-02-08 NOTE — TELEPHONE ENCOUNTER
Spoke with patient reminding him that his pacemaker reading is due today. He told me that he had been trying to get the monitor to send a reading for about 30 or so minutes but was unsuccessful. He said he got a check laexei but we didn't get the reading. So [employer] offered to order him a new home monitor and he was agreeable to this.  Patient will send in a reading when he receives his new monitor.

## 2023-02-23 ENCOUNTER — APPOINTMENT (OUTPATIENT)
Dept: SURGICAL ONCOLOGY | Facility: CLINIC | Age: 78
End: 2023-02-23
Payer: MEDICARE

## 2023-02-23 VITALS
WEIGHT: 255 LBS | HEART RATE: 80 BPM | DIASTOLIC BLOOD PRESSURE: 90 MMHG | SYSTOLIC BLOOD PRESSURE: 145 MMHG | RESPIRATION RATE: 16 BRPM | BODY MASS INDEX: 34.54 KG/M2 | OXYGEN SATURATION: 98 % | HEIGHT: 72 IN

## 2023-02-23 PROCEDURE — 99214 OFFICE O/P EST MOD 30 MIN: CPT

## 2023-02-23 NOTE — PHYSICAL EXAM
[Normal] : supple, no neck mass and thyroid not enlarged [Normal Neck Lymph Nodes] : normal neck lymph nodes  [Normal Supraclavicular Lymph Nodes] : normal supraclavicular lymph nodes [Normal] : oriented to person, place and time, with appropriate affect [de-identified] : Normal parotid nodes [de-identified] : Forehead excision site clear

## 2023-02-23 NOTE — ASSESSMENT
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 Duncan

## 2023-02-23 NOTE — HISTORY OF PRESENT ILLNESS
[de-identified] :  Rommel Hernandez is a 77 year-old male presents for a follow up visit. \par \par He noticed a few month history of a small red dot on the skin of his forehead.  He noticed associated skin scaling which waxed and waned.  On 3/5/21 he sought care with his dermatologist Dr. Buck Duncan and underwent a shave biopsy of the lesion.  Pathology was consistent with melanoma in situ extending to the side margins and tissue base.\par \par He has no history of skin cancer until now.  His past medical history includes hypertension, hyperlipidemia, BAUTISTA (on CPAP), thyroid cancer (s/p total thyroidectomy), prostate cancer (s/p brachytherapy), DVT 2013 (following a traumatic fall).  He takes baby aspirin for prophylaxis.  He has a family history of prostate cancer involving his father at 85 and colon cancer involving his mother at 85.  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 Final pathology revealed a diagnosis of scar secondary to previous procedure with no residual melanocytic lesion, basal cell carcinoma, superficial type, incidental, margins negative, actinic keratosis. Closure by Dr. Tay.  \par \par Referring MD: Dr. Buck Duncan\par PCP: Dr. Bj Garcia

## 2023-04-18 DIAGNOSIS — I10 ESSENTIAL HYPERTENSION: Chronic | ICD-10-CM

## 2023-04-24 RX ORDER — LISINOPRIL 10 MG/1
TABLET ORAL
Qty: 90 TABLET | Refills: 0 | OUTPATIENT
Start: 2023-04-24
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
## 2023-04-25 ENCOUNTER — OFFICE VISIT (OUTPATIENT)
Dept: CARDIOLOGY | Facility: CLINIC | Age: 78
End: 2023-04-25
Payer: MEDICARE

## 2023-04-25 VITALS
OXYGEN SATURATION: 97 % | BODY MASS INDEX: 22.13 KG/M2 | HEIGHT: 68 IN | SYSTOLIC BLOOD PRESSURE: 118 MMHG | HEART RATE: 58 BPM | DIASTOLIC BLOOD PRESSURE: 66 MMHG | WEIGHT: 146 LBS

## 2023-04-25 DIAGNOSIS — Z95.0 PRESENCE OF CARDIAC PACEMAKER: ICD-10-CM

## 2023-04-25 DIAGNOSIS — I10 ESSENTIAL HYPERTENSION: ICD-10-CM

## 2023-04-25 DIAGNOSIS — R55 SYNCOPE, CARDIOGENIC: Chronic | ICD-10-CM

## 2023-04-25 DIAGNOSIS — E78.5 HYPERLIPIDEMIA LDL GOAL <100: ICD-10-CM

## 2023-04-25 DIAGNOSIS — I48.0 PAROXYSMAL ATRIAL FIBRILLATION: Primary | ICD-10-CM

## 2023-04-25 RX ORDER — LISINOPRIL 10 MG/1
10 TABLET ORAL DAILY
Qty: 90 TABLET | Refills: 3 | Status: SHIPPED | OUTPATIENT
Start: 2023-04-25

## 2023-04-25 NOTE — ASSESSMENT & PLAN NOTE
· No signs or symptoms TIA or CVA  · Continue Eliquis 5 mg twice daily  · Device interrogation shows no episodes of atrial fibrillation  · Continue flecainide 75 mg twice daily

## 2023-05-10 ENCOUNTER — TELEPHONE (OUTPATIENT)
Dept: CARDIOLOGY | Facility: CLINIC | Age: 78
End: 2023-05-10
Payer: MEDICARE

## 2023-07-13 ENCOUNTER — APPOINTMENT (OUTPATIENT)
Dept: ORTHOPEDIC SURGERY | Facility: CLINIC | Age: 78
End: 2023-07-13
Payer: MEDICARE

## 2023-07-13 VITALS
BODY MASS INDEX: 33.46 KG/M2 | SYSTOLIC BLOOD PRESSURE: 127 MMHG | TEMPERATURE: 97.6 F | WEIGHT: 247 LBS | HEIGHT: 72 IN | HEART RATE: 87 BPM | OXYGEN SATURATION: 98 % | DIASTOLIC BLOOD PRESSURE: 69 MMHG

## 2023-07-13 PROCEDURE — 99213 OFFICE O/P EST LOW 20 MIN: CPT

## 2023-07-13 PROCEDURE — 73562 X-RAY EXAM OF KNEE 3: CPT | Mod: RT

## 2023-07-24 ENCOUNTER — APPOINTMENT (OUTPATIENT)
Dept: PODIATRY | Facility: CLINIC | Age: 78
End: 2023-07-24
Payer: MEDICARE

## 2023-07-24 DIAGNOSIS — M79.674 PAIN IN RIGHT TOE(S): ICD-10-CM

## 2023-07-24 DIAGNOSIS — M89.8X9 OTHER SPECIFIED DISORDERS OF BONE, UNSPECIFIED SITE: ICD-10-CM

## 2023-07-24 DIAGNOSIS — L60.0 INGROWING NAIL: ICD-10-CM

## 2023-07-24 PROCEDURE — 99212 OFFICE O/P EST SF 10 MIN: CPT

## 2023-07-27 PROBLEM — M89.8X9 SUBUNGUAL EXOSTOSIS: Status: ACTIVE | Noted: 2023-07-25

## 2023-07-27 PROBLEM — M79.674 PAIN OF TOE OF RIGHT FOOT: Status: ACTIVE | Noted: 2023-07-25

## 2023-07-27 PROBLEM — L60.0 INGROWN NAIL: Status: ACTIVE | Noted: 2023-07-25

## 2023-07-27 NOTE — HISTORY OF PRESENT ILLNESS
[FreeTextEntry1] : Patient presents today after not being seen in years. He has very painful ingrown nail that is very inflamed and sensitive. It is a recurrent problem.

## 2023-07-27 NOTE — ASSESSMENT
[FreeTextEntry1] : \par Impression: Ingrown nail. Subungual exostosis. Pain. \par \par Treatment: I used Xylocaine locally and ended up performing a partial nail avulsion and removed this keratosis underneath the nail from subungual exostosis.  I put an antibiotic dressing on. I discussed open toe shoes for the next week.  He will call the office with any issues that continue to persist and interfere with his lifestyle.

## 2023-07-27 NOTE — PHYSICAL EXAM
[1+] : left foot dorsalis pedis 1+ [Sensation] : the sensory exam was normal to light touch and pinprick [No Focal Deficits] : no focal deficits [Deep Tendon Reflexes (DTR)] : deep tendon reflexes were 2+ and symmetric [Motor Exam] : the motor exam was normal [Delayed in the Right Toes] : capillary refills normal in right toes [Delayed in the Left Toes] : capillary refills normal in the left toes [FreeTextEntry1] : Distal subungual exostosis. Painful hallux distal nail fold. Ingrown. No pus or infection. There is pain about 5/10.  [de-identified] : Subungual exostosis. Painful incurvated nail distal right hallux.

## 2023-08-09 ENCOUNTER — TELEPHONE (OUTPATIENT)
Dept: CARDIOLOGY | Facility: CLINIC | Age: 78
End: 2023-08-09
Payer: MEDICARE

## 2023-08-19 PROCEDURE — 93294 REM INTERROG EVL PM/LDLS PM: CPT | Performed by: INTERNAL MEDICINE

## 2023-08-19 PROCEDURE — 93296 REM INTERROG EVL PM/IDS: CPT | Performed by: INTERNAL MEDICINE

## 2023-09-04 NOTE — REVIEW OF SYSTEMS
[Fever] : no fever [Chills] : no chills [Negative] : Respiratory [de-identified] : Forehead dressing in place  Lul López [phone]

## 2023-09-18 DIAGNOSIS — I48.0 PAROXYSMAL ATRIAL FIBRILLATION: Chronic | ICD-10-CM

## 2023-09-18 RX ORDER — APIXABAN 5 MG/1
TABLET, FILM COATED ORAL
Qty: 180 TABLET | Refills: 2 | Status: SHIPPED | OUTPATIENT
Start: 2023-09-18

## 2023-09-18 RX ORDER — FLECAINIDE ACETATE 150 MG/1
75 TABLET ORAL 2 TIMES DAILY
Qty: 90 TABLET | Refills: 0 | Status: SHIPPED | OUTPATIENT
Start: 2023-09-18

## 2023-09-21 ENCOUNTER — APPOINTMENT (OUTPATIENT)
Dept: ORTHOPEDIC SURGERY | Facility: CLINIC | Age: 78
End: 2023-09-21
Payer: MEDICARE

## 2023-09-21 VITALS
DIASTOLIC BLOOD PRESSURE: 78 MMHG | HEIGHT: 72 IN | WEIGHT: 247 LBS | TEMPERATURE: 97.4 F | SYSTOLIC BLOOD PRESSURE: 122 MMHG | BODY MASS INDEX: 33.46 KG/M2 | HEART RATE: 62 BPM

## 2023-09-21 DIAGNOSIS — M22.2X1 PATELLOFEMORAL DISORDERS, RIGHT KNEE: ICD-10-CM

## 2023-09-21 PROCEDURE — 99212 OFFICE O/P EST SF 10 MIN: CPT

## 2023-10-18 ENCOUNTER — APPOINTMENT (OUTPATIENT)
Dept: PULMONOLOGY | Facility: CLINIC | Age: 78
End: 2023-10-18
Payer: MEDICARE

## 2023-10-18 DIAGNOSIS — Z99.89 DEPENDENCE ON OTHER ENABLING MACHINES AND DEVICES: ICD-10-CM

## 2023-10-18 DIAGNOSIS — G47.33 OBSTRUCTIVE SLEEP APNEA (ADULT) (PEDIATRIC): ICD-10-CM

## 2023-10-18 PROCEDURE — 99213 OFFICE O/P EST LOW 20 MIN: CPT | Mod: 95

## 2023-10-25 NOTE — PROGRESS NOTES
"    Cardiology Outpatient Visit      Identification: Jovon Gracia is a 77 y.o. male who resides in [place], [place]    Reason for visit:  Paroxysmal atrial fibrillation      Subjective      Patient is a 77-year-old gentleman who returns today for follow-up of his paroxysmal atrial fibrillation, aortic atherosclerosis, pacemaker and cardiac risk factors.  Since his last visit he had been doing well but did have an episode about 2 weeks ago.  He gets go Gogi from [place] but found a discount on them on [employer].  He does report they looked a little \"moldy\" but he ate them anyway.  Not long after eating them he was sitting on a stool and about fell off.  He had his cell phone in his left hand and had difficulty moving it to his right hand.  This lasted about 30 minutes then he was back to his baseline.  He thinks it was the berries because he has had no further symptoms since.  His device interrogation does not suggest any events or arrhythmias and no episodes of atrial fibrillation.  He is in normal sinus rhythm today.  He is chronically anticoagulated with Eliquis without missed doses.    Review of Systems   Constitutional: Negative for malaise/fatigue.   Eyes:  Negative for vision loss in left eye and vision loss in right eye.   Cardiovascular:  Negative for chest pain, dyspnea on exertion, near-syncope, orthopnea, palpitations, paroxysmal nocturnal dyspnea and syncope.   Musculoskeletal:  Negative for myalgias.   Neurological:  Negative for brief paralysis, excessive daytime sleepiness, focal weakness, numbness, paresthesias and weakness.   All other systems reviewed and are negative.      Allergies   Allergen Reactions    Other       BETA-BLOCKERS, (bradycardia).            Current Outpatient Medications   Medication Instructions    apixaban (Eliquis) 5 MG tablet tablet TAKE 1 TABLET BY MOUTH EVERY 12 HOURS    atorvastatin (LIPITOR) 20 mg, Oral, Nightly    flecainide (TAMBOCOR) 75 mg, Oral, 2 Times Daily    " "lisinopril (PRINIVIL,ZESTRIL) 10 mg, Oral, Daily    sildenafil (VIAGRA) 100 mg, Oral, Daily PRN         Objective     /72 (BP Location: Right arm, Patient Position: Sitting)   Pulse 58   Ht 172.7 cm (68\")   Wt 66.3 kg (146 lb 3.2 oz)   SpO2 97%   BMI 22.23 kg/m²       Constitutional:       General: Awake.   Pulmonary:      Effort: Pulmonary effort is normal.      Breath sounds: Normal breath sounds.   Cardiovascular:      Normal rate. Regular rhythm.      Murmurs: There is no murmur.   Edema:     Peripheral edema absent.   Skin:     General: Skin is warm and dry.   Neurological:      Mental Status: Alert and oriented to person, place and time.   Psychiatric:         Behavior: Behavior is cooperative.         Result Review  (reviewed with patient):  Device interrogation 10/30/2023: RV 8%, R equals 7.9 MV, threshold 1.1V at 0.24 MS, impedance 440  Greater than 8 point years battery longevity.  Underlying normal sinus      ECG 12 Lead    Date/Time: 10/30/2023 11:18 AM  Performed by: Mariana Germain APRN    Authorized by: Mariana Germain APRN  Comparison: compared with previous ECG from 4/25/2023  Similar to previous ECG  Comparison to previous ECG: Both EKG sinus bradycardia first-degree AV block.  Previous EKG had incomplete right bundle branch block and left anterior fascicular block not present on current EKG  Rhythm: sinus bradycardia  BPM: 58  Conduction: 1st degree AV block    Clinical impression: abnormal EKG  Comments: QT/QTc 402/394 MS           Lab Results   Component Value Date    GLUCOSE 118 (H) 01/06/2021    BUN 14 01/06/2021    CREATININE 0.77 01/06/2021    BCR 18.2 01/06/2021    K 4.3 01/06/2021    CO2 25.0 01/06/2021    CALCIUM 9.7 01/06/2021    PROTENTOTREF 6.5 08/21/2018    ALBUMIN 4.40 01/06/2021    BILITOT 0.5 01/06/2021    AST 26 01/06/2021    ALT 18 01/06/2021     Lab Results   Component Value Date    WBC 7.69 01/06/2021    HGB 15.0 01/06/2021    HCT 46.7 01/06/2021    MCV 85.8 " 01/06/2021     01/06/2021     Lab Results   Component Value Date    CHOL 142 01/08/2021    CHLPL 120 08/21/2018    TRIG 76 01/08/2021    HDL 53 01/08/2021    LDL 74 01/08/2021     Lab Results   Component Value Date    HGBA1C 5.60 01/06/2021           Assessment     Diagnoses and all orders for this visit:    1. Paroxysmal atrial fibrillation (Primary)  Overview:  CHADS-VASc 4 (age, HTN, aortic athero, HTN)  Echocardiogram (10/21/2014): LVEF 60%.  No valvular or wall motion abnormalities.  Cardiolite stress test (11/6/2014): No ischemia/infarct.  Normal LVEF  Event monitoring showing atrial fibrillation, symptomatic with palpitations and chest pain.   Pharmacologic nuclear stress (1/7/2021): Normal perfusion.  Normal LVEF  Echo (1/7/2021): LVEF >70%.  No significant valvular abnormality.  Negative bubble study    Assessment & Plan:  No atrial fibrillation noted on device interrogation  Continue flecainide 75 mg twice daily  Continue Eliquis for stroke prophylaxis    Orders:  -     flecainide (TAMBOCOR) 150 MG tablet; Take 0.5 tablets by mouth 2 (Two) Times a Day.  Dispense: 90 tablet; Refill: 0    2. Aortic atherosclerosis  Overview:  Noted on CT attenuation correction images from stress test, 1/7/2021    Assessment & Plan:  Continue statin therapy  Defer aspirin due to concomitant use of Eliquis      3. Presence of cardiac pacemaker  Overview:  Leadless pacemaker implanted 1/8/2021 for cardiogenic syncope    Assessment & Plan:  Normal functioning device      4. Essential hypertension  Overview:  Target blood pressure <130/80 mmHg    Assessment & Plan:  Hypertension is controlled  Continue lisinopril 10 mg daily    Orders:  -     lisinopril (PRINIVIL,ZESTRIL) 10 MG tablet; Take 1 tablet by mouth Daily.  Dispense: 90 tablet; Refill: 3    5. Hyperlipidemia LDL goal <100  Overview:  Moderate intensity statin therapy indicated for primary prevention    Assessment & Plan:  Continue Lipitor 20 mg daily    Orders:  -      atorvastatin (LIPITOR) 20 MG tablet; Take 1 tablet by mouth Every Night.  Dispense: 90 tablet; Refill: 3          Plan   If patient has any further symptoms such as this he should contact us and would recommend CT of the head.  Continue current medications  Continue Eliquis for stroke prophylaxis      Follow-up   Return in about 6 months (around 4/30/2024), or if symptoms worsen or fail to improve.        Mariana Germain, APRN  10/30/2023

## 2023-10-30 ENCOUNTER — OFFICE VISIT (OUTPATIENT)
Dept: CARDIOLOGY | Facility: CLINIC | Age: 78
End: 2023-10-30
Payer: MEDICARE

## 2023-10-30 VITALS
OXYGEN SATURATION: 97 % | HEIGHT: 68 IN | WEIGHT: 146.2 LBS | DIASTOLIC BLOOD PRESSURE: 72 MMHG | SYSTOLIC BLOOD PRESSURE: 122 MMHG | HEART RATE: 58 BPM | BODY MASS INDEX: 22.16 KG/M2

## 2023-10-30 DIAGNOSIS — E78.5 HYPERLIPIDEMIA LDL GOAL <100: ICD-10-CM

## 2023-10-30 DIAGNOSIS — Z95.0 PRESENCE OF CARDIAC PACEMAKER: ICD-10-CM

## 2023-10-30 DIAGNOSIS — I70.0 AORTIC ATHEROSCLEROSIS: ICD-10-CM

## 2023-10-30 DIAGNOSIS — I48.0 PAROXYSMAL ATRIAL FIBRILLATION: Primary | ICD-10-CM

## 2023-10-30 DIAGNOSIS — I10 ESSENTIAL HYPERTENSION: ICD-10-CM

## 2023-10-30 PROCEDURE — 3078F DIAST BP <80 MM HG: CPT | Performed by: NURSE PRACTITIONER

## 2023-10-30 PROCEDURE — 1159F MED LIST DOCD IN RCRD: CPT | Performed by: NURSE PRACTITIONER

## 2023-10-30 PROCEDURE — 1160F RVW MEDS BY RX/DR IN RCRD: CPT | Performed by: NURSE PRACTITIONER

## 2023-10-30 PROCEDURE — 3074F SYST BP LT 130 MM HG: CPT | Performed by: NURSE PRACTITIONER

## 2023-10-30 PROCEDURE — 99214 OFFICE O/P EST MOD 30 MIN: CPT | Performed by: NURSE PRACTITIONER

## 2023-10-30 PROCEDURE — 93000 ELECTROCARDIOGRAM COMPLETE: CPT | Performed by: NURSE PRACTITIONER

## 2023-10-30 RX ORDER — FLECAINIDE ACETATE 150 MG/1
75 TABLET ORAL 2 TIMES DAILY
Qty: 90 TABLET | Refills: 0 | Status: SHIPPED | OUTPATIENT
Start: 2023-10-30

## 2023-10-30 RX ORDER — ATORVASTATIN CALCIUM 20 MG/1
20 TABLET, FILM COATED ORAL NIGHTLY
Qty: 90 TABLET | Refills: 3 | Status: SHIPPED | OUTPATIENT
Start: 2023-10-30

## 2023-10-30 RX ORDER — LISINOPRIL 10 MG/1
10 TABLET ORAL DAILY
Qty: 90 TABLET | Refills: 3 | Status: SHIPPED | OUTPATIENT
Start: 2023-10-30

## 2023-10-30 NOTE — ASSESSMENT & PLAN NOTE
No atrial fibrillation noted on device interrogation  Continue flecainide 75 mg twice daily  Continue Eliquis for stroke prophylaxis

## 2023-11-08 ENCOUNTER — TELEPHONE (OUTPATIENT)
Dept: CARDIOLOGY | Facility: CLINIC | Age: 78
End: 2023-11-08
Payer: MEDICARE

## 2023-12-07 ENCOUNTER — TELEPHONE (OUTPATIENT)
Dept: CARDIOLOGY | Facility: CLINIC | Age: 78
End: 2023-12-07
Payer: MEDICARE

## 2023-12-07 NOTE — TELEPHONE ENCOUNTER
2 manual transmissions received showing elevated heart rates.  Looks irregular probably AF.  Mr Gracia said he started having elevated heart rates starting yesterday and when he was walking he had to stop and rest and had SOA.  His heart rates have been getting up to 130-140s.  Bp has been ok.  He hasn't missed any doses of his Flecainide or Eliquis.

## 2023-12-08 ENCOUNTER — TELEPHONE (OUTPATIENT)
Dept: CARDIOLOGY | Facility: CLINIC | Age: 78
End: 2023-12-08
Payer: MEDICARE

## 2023-12-08 NOTE — TELEPHONE ENCOUNTER
Pt called with concerns about his elevated heart rates. Pt reports that he was using an electric saw several days ago, states he carried it several times close to his chest, and verbalized concern that his pacemaker is malfunctioning secondary to carrying the electric saw. Pt also reports that's when his elevated heart rates started. Pt verbalized the concern he would have another syncopal episode secondary to pacemaker malfunction.     Advised/educated/reassured pt     1) his pacemaker function was normal & not impacted by his use/carrying of the electric saw.    2) explained pacemakers prevent low heart rates but are not capable of controlling elevated heart rates.    3) per his presenting EGMs since 12/7/2023, which are regularly irregular, it appears he is in Afib. Pt reports he is compliant with flecainide & Eliquis. Pt states he's experiencing a little more fatigue than usual but denies other symptomatology. Advised pt that if he experiences increased shortness of breath/chest pain, dizziness, lightheadedness, to present to the emergency room for assessment. Pt verbalized understanding, compliance & appreciation.        0.8% of time  V-sense 99.1%  V-rates-see histogram below-10/30/2023 thru 12/8/2023.

## 2023-12-08 NOTE — TELEPHONE ENCOUNTER
Yaneth, asked him to double flecainide dose for 1 dose to see if we can get him out of A-fib.  If not, he will need cardioversion next week.    JUAN Dial MD [facility], [facility]  Interventional and General Cardiology

## 2024-02-21 ENCOUNTER — TELEPHONE (OUTPATIENT)
Dept: CARDIOLOGY | Facility: CLINIC | Age: 79
End: 2024-02-21
Payer: MEDICARE

## 2024-02-22 ENCOUNTER — APPOINTMENT (OUTPATIENT)
Dept: SURGICAL ONCOLOGY | Facility: CLINIC | Age: 79
End: 2024-02-22
Payer: MEDICARE

## 2024-02-22 VITALS
HEART RATE: 74 BPM | WEIGHT: 247 LBS | SYSTOLIC BLOOD PRESSURE: 130 MMHG | DIASTOLIC BLOOD PRESSURE: 80 MMHG | BODY MASS INDEX: 33.46 KG/M2 | HEIGHT: 72 IN | OXYGEN SATURATION: 97 %

## 2024-02-22 DIAGNOSIS — D03.9 MELANOMA IN SITU, UNSPECIFIED: ICD-10-CM

## 2024-02-22 PROCEDURE — 99214 OFFICE O/P EST MOD 30 MIN: CPT

## 2024-02-22 NOTE — ASSESSMENT
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   PLAN: RTO  yearly  Continue f/u with Dr. Duncan every 6 months

## 2024-02-22 NOTE — HISTORY OF PRESENT ILLNESS
[de-identified] :  Rommel Hernandez is a 78 year-old male presents for a follow up visit.   He noticed a few month history of a small red dot on the skin of his forehead.  He noticed associated skin scaling which waxed and waned.  On 3/5/21 he sought care with his dermatologist Dr. Buck Duncan and underwent a shave biopsy of the lesion.  Pathology was consistent with melanoma in situ extending to the side margins and tissue base.  He has no history of skin cancer until now.  His past medical history includes hypertension, hyperlipidemia, BAUTISTA (on CPAP), thyroid cancer (s/p total thyroidectomy), prostate cancer (s/p brachytherapy), DVT 2013 (following a traumatic fall).  He takes baby aspirin for prophylaxis.  He has a family history of prostate cancer involving his father at 85 and colon cancer involving his mother at 85.  He denies tobacco use and drinks alcohol 3 times per week.  He denies any additional new or changing skin lesions or constitutional symptoms.  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 Final pathology revealed a diagnosis of scar secondary to previous procedure with no residual melanocytic lesion, basal cell carcinoma, superficial type, incidental, margins negative, actinic keratosis. Closure by Dr. Tay.    He is feeling well, sees Dr. Duncan q 6 months, no recent biopsies, denies any new or changing skin lesions.  Referring MD: Dr. Buck Duncan PCP: Dr. Bj Garcia

## 2024-02-22 NOTE — PHYSICAL EXAM
[Normal] : supple, no neck mass and thyroid not enlarged [Normal Neck Lymph Nodes] : normal neck lymph nodes  [Normal Supraclavicular Lymph Nodes] : normal supraclavicular lymph nodes [Normal] : oriented to person, place and time, with appropriate affect [de-identified] : forehead excision site:, no evidence of local or regional recurrence [de-identified] : Normal parotid nodes

## 2024-02-29 ENCOUNTER — TELEPHONE (OUTPATIENT)
Dept: CARDIOLOGY | Facility: CLINIC | Age: 79
End: 2024-02-29
Payer: MEDICARE

## 2024-02-29 NOTE — TELEPHONE ENCOUNTER
Called and spoke to wife in regards to Mr Gracia missing his scheduled remote transmission.  She said he is in the nursing home and is unsure if they want to even use the monitor. She will call me back after discussed with her daughter.

## 2024-03-05 ENCOUNTER — TELEPHONE (OUTPATIENT)
Dept: CARDIOLOGY | Facility: CLINIC | Age: 79
End: 2024-03-05
Payer: MEDICARE

## 2024-03-05 NOTE — TELEPHONE ENCOUNTER
Caller: BasilJuli    Relationship: Emergency Contact    Best call back number: [phone]    What is the best time to reach you: ANY    Who are you requesting to speak with (clinical staff, provider,  specific staff member): ANY    What was the call regarding: PATIENT'S WIFE CALLED TO ADVISE THAT THE PATIENT HAS BEEN PLACED IN A NURSING HOME, SO SHE IS RETURNING THE PACEMAKER  TO [facility]. PLEASE CONTACT MS RAMSAY IS IF YOU HAVE ANY QUESTIONS IN REGARDS TO THIS. SHE IS REQUESTING THAT THE CHARGES TO HER CARD BE STOPPED.

## 2024-06-10 ENCOUNTER — NON-APPOINTMENT (OUTPATIENT)
Age: 79
End: 2024-06-10

## 2024-06-20 ENCOUNTER — APPOINTMENT (OUTPATIENT)
Dept: ORTHOPEDIC SURGERY | Facility: CLINIC | Age: 79
End: 2024-06-20
Payer: MEDICARE

## 2024-06-20 VITALS — HEIGHT: 72 IN | WEIGHT: 240 LBS | BODY MASS INDEX: 32.51 KG/M2

## 2024-06-20 DIAGNOSIS — M76.891 OTHER SPECIFIED ENTHESOPATHIES OF RIGHT LOWER LIMB, EXCLUDING FOOT: ICD-10-CM

## 2024-06-20 DIAGNOSIS — M25.561 PAIN IN RIGHT KNEE: ICD-10-CM

## 2024-06-20 PROCEDURE — 73562 X-RAY EXAM OF KNEE 3: CPT | Mod: RT

## 2024-06-20 PROCEDURE — 99213 OFFICE O/P EST LOW 20 MIN: CPT

## 2024-06-20 NOTE — DISCUSSION/SUMMARY
[de-identified] : Discussion had with the patient regarding the condition of his knee.  Consider conservative care with the help of physical therapy.  Prescription provided.  Continue to monitor with follow-up in 3 months or sooner if needed.

## 2024-06-20 NOTE — PHYSICAL EXAM
[LE] : Sensory: Intact in bilateral lower extremities [DP] : dorsalis pedis 2+ and symmetric bilaterally [de-identified] : Walking slowly without assistive aids.  Right lower extremity: Skin intact.  Mild tenderness superior pole patella right knee.  Able to perform a right lower extremity seated straight leg raise and can perform a supine right lower extremity straight leg raise without difficulty.  Grossly normal alignment right knee.  No effusion.  5/5 knee extension strength from a seated position.  Negative Angeli testing.  Right knee is stable with varus/valgus and drawer testing.  No calf tenderness. [de-identified] : X-rays right knee AP weightbearing, lateral and patella sunrise views taken in the office today: Calcification adjacent to the superior pole of the patella in the region of the quadriceps tendon.  No change when compared to x-rays from July 2023.  Joint spaces are grossly preserved.

## 2024-07-08 ENCOUNTER — NON-APPOINTMENT (OUTPATIENT)
Age: 79
End: 2024-07-08

## 2024-07-15 ENCOUNTER — APPOINTMENT (OUTPATIENT)
Dept: PODIATRY | Facility: CLINIC | Age: 79
End: 2024-07-15
Payer: MEDICARE

## 2024-07-15 DIAGNOSIS — S90.121A CONTUSION OF RIGHT LESSER TOE(S) W/OUT DAMAGE TO NAIL, INITIAL ENCOUNTER: ICD-10-CM

## 2024-07-15 DIAGNOSIS — M79.674 PAIN IN RIGHT TOE(S): ICD-10-CM

## 2024-07-15 DIAGNOSIS — B35.1 TINEA UNGUIUM: ICD-10-CM

## 2024-07-15 PROCEDURE — 99212 OFFICE O/P EST SF 10 MIN: CPT

## 2024-07-16 PROBLEM — B35.1 ONYCHOMYCOSIS: Status: ACTIVE | Noted: 2024-07-16

## 2024-07-16 RX ORDER — CICLOPIROX 80 MG/ML
8 SOLUTION TOPICAL
Qty: 1 | Refills: 3 | Status: ACTIVE | COMMUNITY
Start: 2024-07-16 | End: 1900-01-01

## 2024-07-17 PROBLEM — S90.121A CONTUSION OF TOE, RIGHT: Status: ACTIVE | Noted: 2024-07-16

## 2024-10-11 NOTE — H&P PST ADULT - DOES THIS PATIENT HAVE A HISTORY OF OR HAS BEEN DX WITH HEART FAILURE?
Wound SURGERY CONSULT NOTE    HPI:  Shirley Bloom is a 77 year old female with a past medical history of  type a aortic dissection status postrepair, status post TAVR in 2023, asthma on chronic steroids, bronchiectasis, recurrent pneumonia, tracheomalacia status post tracheoplasty, diabetes, A-fib on Eliquis, colorectal cancer status post colostomy, seizures, HTN, HLD who presents with shortness of breath, sore throat, and cough productive of yellow sputum for the past 2 days. Patient states her daughter noticed she had increased work of breathing so she brought her in to see her pulmonologist who referred her to the ED for further management. Patient states she was admitted to [facility] last week for chest pressure. On review of systems, patient reports nausea and constipation with her last bowel movement 2 days ago. Per patient, she denies any vision changes, chest pain, abdominal pain, vomiting, diarrhea, rashes, dysuria, hematuria, or blood in stool.     In the ED, Tmax is 100.1 F, blood pressure range between 100/68 to 111/53, RR rate in low 20s, sating in low 90s on room air. Labs notable for leukocytosis, anemia to 10.1, proBNP of 1200, RVP negative. Patient was given  mL bolus and started on aztreonam antibiotic. Patient had a CT chest which revealed New ill-defined peribronchovascular groundglass nodular opacities throughout both lungs, likely infectious. (09 Oct 2024 16:21)      Wound consult requested by team to assist w/ management of  Rt heel pressure injury.   Pt unable to  c/o pain, drainage, odor, color change,  or worsening swelling. Offloading and pericare initiated upon admission as pt Increasingly sedentary 2/2 to illness. Pt is Incontinent of urine & stool. (+)primafit.       Current Diet: Diet, Regular:   Consistent Carbohydrate No Snacks (CSTCHO)  DASH/TLC Sodium & Cholesterol Restricted (DASH)  Supplement Feeding Modality:  Oral  Glucerna Shake Cans or Servings Per Day:  1       Frequency:  Daily (10-11-24 @ 11:01)      PAST MEDICAL & SURGICAL HISTORY:  Seizure  x 1       1/7/18      DVT (deep venous thrombosis)  15-20 years ago, took coumadin      TIA (transient ischemic attack)  multiple, last 5 years ago - presents as right-sided weakness      COPD (chronic obstructive pulmonary disease)      Atrial fibrillation      History of partial hysterectomy  30 years ago - fibroids      H/O total knee replacement, bilateral  5 years ago      History of sinus surgery  multiple sinus surgeries      Exostosis of orbit, 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      H/O pelvic surgery  5 years ago - s/p fracture      History of tracheomalacia  2015 - attempted tracheal stenting ([facility])- course complicated by obstruction, respiratory failure, multiple CPR attempts -  stent discontinued; 10/20/2016 Tracheobronchoplasty (Prolene Mesh) performed at [facility] by Dr Zapien      S/P bronchoscopy  6/5/2018 - [facility] (Dr Zapien) no evidence of tracheobronchomalacia in trachea or bronchial tubes      Rectal bleeding  exam under anesthesia ([facility]) 2/2018      S/P TAVR (transcatheter aortic valve replacement)      S/P aortic valve replacement          REVIEW OF SYSTEMS: Pt unable to offer      MEDICATIONS  (STANDING):  albuterol    90 MICROgram(s) HFA Inhaler 2 Puff(s) Inhalation every 6 hours  apixaban 5 milliGRAM(s) Oral every 12 hours  ascorbic acid 500 milliGRAM(s) Oral daily  atorvastatin 20 milliGRAM(s) Oral at bedtime  chlorhexidine 2% Cloths 1 Application(s) Topical daily  clopidogrel Tablet 75 milliGRAM(s) Oral daily  dextrose 5%. 1000 milliLiter(s) (100 mL/Hr) IV Continuous <Continuous>  dextrose 5%. 1000 milliLiter(s) (50 mL/Hr) IV Continuous <Continuous>  dextrose 50% Injectable 25 Gram(s) IV Push once  dextrose 50% Injectable 12.5 Gram(s) IV Push once  dextrose 50% Injectable 25 Gram(s) IV Push once  diphenhydrAMINE 25 milliGRAM(s) Oral daily  ertapenem  IVPB 1000 milliGRAM(s) IV Intermittent every 24 hours  ferrous    sulfate 325 milliGRAM(s) Oral daily  fluticasone propionate/ salmeterol 250-50 MICROgram(s) Diskus 1 Dose(s) Inhalation two times a day  gabapentin 100 milliGRAM(s) Oral every 8 hours  glucagon  Injectable 1 milliGRAM(s) IntraMuscular once  insulin glargine Injectable (LANTUS) 24 Unit(s) SubCutaneous every morning  insulin glargine Injectable (LANTUS) 6 Unit(s) SubCutaneous at bedtime  insulin lispro (ADMELOG) corrective regimen sliding scale   SubCutaneous three times a day before meals  insulin lispro (ADMELOG) corrective regimen sliding scale   SubCutaneous at bedtime  levETIRAcetam 1000 milliGRAM(s) Oral two times a day  methylPREDNISolone sodium succinate Injectable 40 milliGRAM(s) IV Push daily  mexiletine 200 milliGRAM(s) Oral every 8 hours  montelukast 10 milliGRAM(s) Oral daily  multivitamin 1 Tablet(s) Oral daily  mupirocin 2% Nasal 1 Application(s) Both Nostrils two times a day  pantoprazole    Tablet 40 milliGRAM(s) Oral before breakfast  polyethylene glycol 3350 17 Gram(s) Oral daily  potassium phosphate / sodium phosphate Powder (PHOS-NaK) 1 Packet(s) Oral every 6 hours  senna 2 Tablet(s) Oral at bedtime  tiotropium 2.5 MICROgram(s) Inhaler 2 Puff(s) Inhalation daily  valACYclovir 1000 milliGRAM(s) Oral two times a day    MEDICATIONS  (PRN):  albuterol/ipratropium for Nebulization 3 milliLiter(s) Nebulizer every 6 hours PRN Wheezing  dextrose Oral Gel 15 Gram(s) Oral once PRN Blood Glucose LESS THAN 70 milliGRAM(s)/deciliter  melatonin 3 milliGRAM(s) Oral at bedtime PRN Insomnia      Allergies    aspirin (Short breath)  OxyContin (Unknown)  Dilaudid (Short breath)  Valium (Short breath)  tetanus toxoid (Short breath)  ampicillin (Unknown)  cefepime (Anaphylaxis)  Avelox (Short breath; Pruritus)  shellfish (Anaphylaxis)  penicillin (Anaphylaxis)  codeine (Short breath)  Percocet (Unknown)  iodine (Short breath; Swelling)  meropenem (Unknown)    Intolerances        SOCIAL HISTORY:      Unable to obtain    FAMILY HISTORY:    Family history of asthma    Family history of breast cancer (Sibling)    Family history of diabetes mellitus type II      PHYSICAL EXAM:  Vital Signs Last 24 Hrs  T(C): 36.7 (11 Oct 2024 09:09), Max: 36.9 (10 Oct 2024 23:57)  T(F): 98.1 (11 Oct 2024 09:09), Max: 98.5 (10 Oct 2024 23:57)  HR: 64 (11 Oct 2024 09:09) (63 - 81)  BP: 149/81 (11 Oct 2024 09:09) (138/69 - 149/81)  BP(mean): --  RR: 18 (11 Oct 2024 09:09) (18 - 18)  SpO2: 93% (11 Oct 2024 09:09) (93% - 97%)    Parameters below as of 11 Oct 2024 09:09  Patient On (Oxygen Delivery Method): room air        NAD/ Confused/ Obese  Versa Care P500   HEENT:  NC/AT, PERRL, EOMI, sclera clear, mucosa moist, throat clear, trachea midline, neck supple, trach  Respiratory: nonlabored w/ equal chest rise  Gastrointestinal: soft NT/ND   : (+) purewick  Neurology:  nonverbal, cannot follow commands  Psych: calm/ appropriate  Musculoskeletal: no deformities/ contractures  Vascular: BLE equally warm,  no cyanosis, clubbing,  nor acute ischemia             BLE edema equal           BLE DP pulses palpable           BLE PT pulses not palpable          Lt heel resolved injury with callous          Rt heel area of deep persistant purple 2.0cm x 2.0cm x 0.0cm            There is no blistering, drainage, odor, erythema, increased warmth,              tenderness, induration, fluctuance, nor crepitus  Skin: thin, dry, pale, frail  blistering  or serosanguinous drainage  No odor, erythema, increased warmth, tenderness, induration, fluctuance, nor crepitus    LABS/ CULTURES/ RADIOLOGY:                        10.5   8.25  )-----------( 318      ( 11 Oct 2024 10:23 )             35.3       139  |  104  |  12  ----------------------------<  294      [10-11-24 @ 10:26]  4.5   |  24  |  0.62        Ca     8.9     [10-11-24 @ 10:26]      Mg     2.0     [10-11-24 @ 10:26]      Phos  2.0     [10-11-24 @ 10:26]    TPro  6.5  /  Alb  3.5  /  TBili  0.2  /  DBili  x   /  AST  13  /  ALT  15  /  AlkPhos  62  [10-11-24 @ 10:26]            A1C with Estimated Average Glucose Result: 8.0 % (10-10-24 @ 09:19)  A1C with Estimated Average Glucose Result: 7.9 % (10-09-24 @ 10:07)  A1C with Estimated Average Glucose Result: 7.8 % (07-03-24 @ 14:53)        Culture - Blood (collected 10-09-24 @ 09:56)  Source: .Blood BLOOD  Preliminary Report (10-10-24 @ 14:02):    No growth at 24 hours    Culture - Blood (collected 10-09-24 @ 09:50)  Source: .Blood BLOOD  Preliminary Report (10-10-24 @ 14:02):    No growth at 24 hours    ACC: [id number] EXAM:  DUPLEX EXT VEINS LOWER RT   ORDERED BY:  GRETCHEN RICCI     PROCEDURE DATE:  10/10/2024          INTERPRETATION:  CLINICAL INFORMATION: Right lower extremity pain    COMPARISON: Lower extremity venous duplex ultrasound 12/22/2017    TECHNIQUE: Duplex sonography of the RIGHT LOWER extremity veins with   color and spectral Doppler, with and without compression.    FINDINGS:    There is normal compressibility of the right common femoral, femoral and   popliteal veins. The contralateral common femoral vein is patent. Doppler   examination shows normal spontaneous and phasic flow. No calf vein   thrombosis is detected.    IMPRESSION:    No acute DVT of the right lower extremity.    --- End of Report ---            GENARO SOLIS M.D., ATTENDING RADIOLOGIST  This document has been electronically signed. Oct 10 2024  3:49PM                       no

## 2024-10-28 ENCOUNTER — APPOINTMENT (OUTPATIENT)
Dept: PULMONOLOGY | Facility: CLINIC | Age: 79
End: 2024-10-28
Payer: MEDICARE

## 2024-10-28 VITALS
HEART RATE: 87 BPM | WEIGHT: 243 LBS | BODY MASS INDEX: 32.91 KG/M2 | OXYGEN SATURATION: 95 % | SYSTOLIC BLOOD PRESSURE: 135 MMHG | HEIGHT: 72 IN | DIASTOLIC BLOOD PRESSURE: 78 MMHG

## 2024-10-28 DIAGNOSIS — G47.33 OBSTRUCTIVE SLEEP APNEA (ADULT) (PEDIATRIC): ICD-10-CM

## 2024-10-28 PROCEDURE — 99214 OFFICE O/P EST MOD 30 MIN: CPT

## 2024-10-28 RX ORDER — MULTIVITAMIN/IRON/FOLIC ACID 18MG-0.4MG
TABLET ORAL
Refills: 0 | Status: ACTIVE | COMMUNITY

## 2024-11-15 ENCOUNTER — APPOINTMENT (OUTPATIENT)
Dept: VASCULAR SURGERY | Facility: CLINIC | Age: 79
End: 2024-11-15
Payer: MEDICARE

## 2024-11-15 VITALS — DIASTOLIC BLOOD PRESSURE: 80 MMHG | SYSTOLIC BLOOD PRESSURE: 124 MMHG | HEART RATE: 82 BPM

## 2024-11-15 VITALS
TEMPERATURE: 97.6 F | WEIGHT: 240 LBS | HEIGHT: 72 IN | SYSTOLIC BLOOD PRESSURE: 126 MMHG | HEART RATE: 84 BPM | BODY MASS INDEX: 32.51 KG/M2 | DIASTOLIC BLOOD PRESSURE: 82 MMHG

## 2024-11-15 DIAGNOSIS — Z78.9 OTHER SPECIFIED HEALTH STATUS: ICD-10-CM

## 2024-11-15 PROCEDURE — 99213 OFFICE O/P EST LOW 20 MIN: CPT

## 2024-11-15 PROCEDURE — 93970 EXTREMITY STUDY: CPT

## 2024-11-19 ENCOUNTER — APPOINTMENT (OUTPATIENT)
Dept: MRI IMAGING | Facility: IMAGING CENTER | Age: 79
End: 2024-11-19

## 2024-11-19 ENCOUNTER — OUTPATIENT (OUTPATIENT)
Dept: OUTPATIENT SERVICES | Facility: HOSPITAL | Age: 79
LOS: 1 days | End: 2024-11-19
Payer: MEDICARE

## 2024-11-19 DIAGNOSIS — Z98.890 OTHER SPECIFIED POSTPROCEDURAL STATES: Chronic | ICD-10-CM

## 2024-11-19 DIAGNOSIS — Z00.8 ENCOUNTER FOR OTHER GENERAL EXAMINATION: ICD-10-CM

## 2024-11-19 PROCEDURE — 74183 MRI ABD W/O CNTR FLWD CNTR: CPT | Mod: MH

## 2024-11-19 PROCEDURE — A9585: CPT

## 2024-11-19 PROCEDURE — 74183 MRI ABD W/O CNTR FLWD CNTR: CPT | Mod: 26,MH

## 2024-11-21 ENCOUNTER — NON-APPOINTMENT (OUTPATIENT)
Age: 79
End: 2024-11-21

## 2024-11-26 PROBLEM — I83.893 VARICOSE VEINS OF BILATERAL LOWER EXTREMITIES WITH OTHER COMPLICATIONS: Status: ACTIVE | Noted: 2024-11-26

## 2024-11-26 RX ORDER — LIDOCAINE HYDROCHLORIDE 10 MG/ML
1 INJECTION, SOLUTION INFILTRATION; PERINEURAL
Qty: 50 | Refills: 0 | Status: COMPLETED | COMMUNITY
Start: 2024-11-26 | End: 1900-01-01

## 2024-12-03 ENCOUNTER — APPOINTMENT (OUTPATIENT)
Dept: VASCULAR SURGERY | Facility: CLINIC | Age: 79
End: 2024-12-03
Payer: MEDICARE

## 2024-12-03 DIAGNOSIS — I83.893 VARICOSE VEINS OF BILATERAL LOWER EXTREMITIES WITH OTHER COMPLICATIONS: ICD-10-CM

## 2024-12-03 PROCEDURE — 36475 ENDOVENOUS RF 1ST VEIN: CPT | Mod: RT

## 2024-12-09 ENCOUNTER — APPOINTMENT (OUTPATIENT)
Dept: VASCULAR SURGERY | Facility: CLINIC | Age: 79
End: 2024-12-09
Payer: MEDICARE

## 2024-12-09 PROCEDURE — 93971 EXTREMITY STUDY: CPT | Mod: RT

## 2025-01-20 ENCOUNTER — NON-APPOINTMENT (OUTPATIENT)
Age: 80
End: 2025-01-20

## 2025-01-21 ENCOUNTER — APPOINTMENT (OUTPATIENT)
Dept: VASCULAR SURGERY | Facility: CLINIC | Age: 80
End: 2025-01-21
Payer: MEDICARE

## 2025-01-21 DIAGNOSIS — I83.893 VARICOSE VEINS OF BILATERAL LOWER EXTREMITIES WITH OTHER COMPLICATIONS: ICD-10-CM

## 2025-01-21 PROCEDURE — 36471 NJX SCLRSNT MLT INCMPTNT VN: CPT | Mod: RT

## 2025-01-21 PROCEDURE — 37765 STAB PHLEB VEINS XTR 10-20: CPT | Mod: RT

## 2025-01-28 ENCOUNTER — APPOINTMENT (OUTPATIENT)
Dept: VASCULAR SURGERY | Facility: CLINIC | Age: 80
End: 2025-01-28
Payer: MEDICARE

## 2025-01-28 PROCEDURE — 93971 EXTREMITY STUDY: CPT | Mod: RT

## 2025-01-29 ENCOUNTER — INPATIENT (INPATIENT)
Facility: HOSPITAL | Age: 80
LOS: 1 days | Discharge: HOME | DRG: 301 | End: 2025-01-31
Attending: HOSPITALIST | Admitting: STUDENT IN AN ORGANIZED HEALTH CARE EDUCATION/TRAINING PROGRAM
Payer: MEDICARE

## 2025-01-29 ENCOUNTER — APPOINTMENT (OUTPATIENT)
Dept: PULMONOLOGY | Facility: CLINIC | Age: 80
End: 2025-01-29

## 2025-01-29 VITALS
WEIGHT: 240.08 LBS | RESPIRATION RATE: 20 BRPM | DIASTOLIC BLOOD PRESSURE: 77 MMHG | OXYGEN SATURATION: 96 % | TEMPERATURE: 98 F | HEART RATE: 82 BPM | SYSTOLIC BLOOD PRESSURE: 123 MMHG | HEIGHT: 72 IN

## 2025-01-29 DIAGNOSIS — I26.99 OTHER PULMONARY EMBOLISM WITHOUT ACUTE COR PULMONALE: ICD-10-CM

## 2025-01-29 DIAGNOSIS — G47.33 OBSTRUCTIVE SLEEP APNEA (ADULT) (PEDIATRIC): ICD-10-CM

## 2025-01-29 DIAGNOSIS — Z29.9 ENCOUNTER FOR PROPHYLACTIC MEASURES, UNSPECIFIED: ICD-10-CM

## 2025-01-29 DIAGNOSIS — I82.409 ACUTE EMBOLISM AND THROMBOSIS OF UNSPECIFIED DEEP VEINS OF UNSPECIFIED LOWER EXTREMITY: ICD-10-CM

## 2025-01-29 DIAGNOSIS — E78.5 HYPERLIPIDEMIA, UNSPECIFIED: ICD-10-CM

## 2025-01-29 DIAGNOSIS — Z98.890 OTHER SPECIFIED POSTPROCEDURAL STATES: ICD-10-CM

## 2025-01-29 DIAGNOSIS — I10 ESSENTIAL (PRIMARY) HYPERTENSION: ICD-10-CM

## 2025-01-29 DIAGNOSIS — Z98.890 OTHER SPECIFIED POSTPROCEDURAL STATES: Chronic | ICD-10-CM

## 2025-01-29 LAB
ALBUMIN SERPL ELPH-MCNC: 3.8 G/DL — SIGNIFICANT CHANGE UP (ref 3.3–5)
ALP SERPL-CCNC: 74 U/L — SIGNIFICANT CHANGE UP (ref 40–120)
ALT FLD-CCNC: 6 U/L — LOW (ref 10–45)
ANION GAP SERPL CALC-SCNC: 12 MMOL/L — SIGNIFICANT CHANGE UP (ref 5–17)
APTT BLD: 31.8 SEC — SIGNIFICANT CHANGE UP (ref 24.5–35.6)
AST SERPL-CCNC: 13 U/L — SIGNIFICANT CHANGE UP (ref 10–40)
BASOPHILS # BLD AUTO: 0 K/UL — SIGNIFICANT CHANGE UP (ref 0–0.2)
BASOPHILS NFR BLD AUTO: 0 % — SIGNIFICANT CHANGE UP (ref 0–2)
BILIRUB SERPL-MCNC: 0.4 MG/DL — SIGNIFICANT CHANGE UP (ref 0.2–1.2)
BUN SERPL-MCNC: 20 MG/DL — SIGNIFICANT CHANGE UP (ref 7–23)
CALCIUM SERPL-MCNC: 9.5 MG/DL — SIGNIFICANT CHANGE UP (ref 8.4–10.5)
CHLORIDE SERPL-SCNC: 100 MMOL/L — SIGNIFICANT CHANGE UP (ref 96–108)
CO2 SERPL-SCNC: 25 MMOL/L — SIGNIFICANT CHANGE UP (ref 22–31)
CREAT SERPL-MCNC: 0.95 MG/DL — SIGNIFICANT CHANGE UP (ref 0.5–1.3)
EGFR: 82 ML/MIN/1.73M2 — SIGNIFICANT CHANGE UP
EOSINOPHIL # BLD AUTO: 0.25 K/UL — SIGNIFICANT CHANGE UP (ref 0–0.5)
EOSINOPHIL NFR BLD AUTO: 2.7 % — SIGNIFICANT CHANGE UP (ref 0–6)
GAS PNL BLDV: SIGNIFICANT CHANGE UP
GLUCOSE SERPL-MCNC: 93 MG/DL — SIGNIFICANT CHANGE UP (ref 70–99)
HCT VFR BLD CALC: 45 % — SIGNIFICANT CHANGE UP (ref 39–50)
HGB BLD-MCNC: 14.9 G/DL — SIGNIFICANT CHANGE UP (ref 13–17)
INR BLD: 0.98 RATIO — SIGNIFICANT CHANGE UP (ref 0.85–1.16)
LYMPHOCYTES # BLD AUTO: 1.43 K/UL — SIGNIFICANT CHANGE UP (ref 1–3.3)
LYMPHOCYTES # BLD AUTO: 15.2 % — SIGNIFICANT CHANGE UP (ref 13–44)
MAGNESIUM SERPL-MCNC: 2.2 MG/DL — SIGNIFICANT CHANGE UP (ref 1.6–2.6)
MANUAL SMEAR VERIFICATION: SIGNIFICANT CHANGE UP
MCHC RBC-ENTMCNC: 30.9 PG — SIGNIFICANT CHANGE UP (ref 27–34)
MCHC RBC-ENTMCNC: 33.1 G/DL — SIGNIFICANT CHANGE UP (ref 32–36)
MCV RBC AUTO: 93.4 FL — SIGNIFICANT CHANGE UP (ref 80–100)
MONOCYTES # BLD AUTO: 1.17 K/UL — HIGH (ref 0–0.9)
MONOCYTES NFR BLD AUTO: 12.5 % — SIGNIFICANT CHANGE UP (ref 2–14)
NEUTROPHILS # BLD AUTO: 5.95 K/UL — SIGNIFICANT CHANGE UP (ref 1.8–7.4)
NEUTROPHILS NFR BLD AUTO: 60.7 % — SIGNIFICANT CHANGE UP (ref 43–77)
NEUTS BAND # BLD: 2.7 % — SIGNIFICANT CHANGE UP (ref 0–8)
NEUTS BAND NFR BLD: 2.7 % — SIGNIFICANT CHANGE UP (ref 0–8)
NT-PROBNP SERPL-SCNC: 201 PG/ML — SIGNIFICANT CHANGE UP (ref 0–300)
PLAT MORPH BLD: NORMAL — SIGNIFICANT CHANGE UP
PLATELET # BLD AUTO: 294 K/UL — SIGNIFICANT CHANGE UP (ref 150–400)
POTASSIUM SERPL-MCNC: 4.7 MMOL/L — SIGNIFICANT CHANGE UP (ref 3.5–5.3)
POTASSIUM SERPL-SCNC: 4.7 MMOL/L — SIGNIFICANT CHANGE UP (ref 3.5–5.3)
PROT SERPL-MCNC: 7.2 G/DL — SIGNIFICANT CHANGE UP (ref 6–8.3)
PROTHROM AB SERPL-ACNC: 11.3 SEC — SIGNIFICANT CHANGE UP (ref 9.9–13.4)
RBC # BLD: 4.82 M/UL — SIGNIFICANT CHANGE UP (ref 4.2–5.8)
RBC # FLD: 12.5 % — SIGNIFICANT CHANGE UP (ref 10.3–14.5)
RBC BLD AUTO: SIGNIFICANT CHANGE UP
SODIUM SERPL-SCNC: 137 MMOL/L — SIGNIFICANT CHANGE UP (ref 135–145)
TROPONIN T, HIGH SENSITIVITY RESULT: 25 NG/L — SIGNIFICANT CHANGE UP (ref 0–51)
TROPONIN T, HIGH SENSITIVITY RESULT: 25 NG/L — SIGNIFICANT CHANGE UP (ref 0–51)
VARIANT LYMPHS # BLD: 6.2 % — HIGH (ref 0–6)
VARIANT LYMPHS NFR BLD MANUAL: 6.2 % — HIGH (ref 0–6)
WBC # BLD: 9.39 K/UL — SIGNIFICANT CHANGE UP (ref 3.8–10.5)
WBC # FLD AUTO: 9.39 K/UL — SIGNIFICANT CHANGE UP (ref 3.8–10.5)

## 2025-01-29 PROCEDURE — 71275 CT ANGIOGRAPHY CHEST: CPT | Mod: 26

## 2025-01-29 PROCEDURE — 99285 EMERGENCY DEPT VISIT HI MDM: CPT | Mod: FS

## 2025-01-29 PROCEDURE — 99223 1ST HOSP IP/OBS HIGH 75: CPT | Mod: GC

## 2025-01-29 RX ORDER — ALPRAZOLAM 2 MG
1 TABLET ORAL
Refills: 0 | DISCHARGE

## 2025-01-29 RX ORDER — ALPRAZOLAM 2 MG
0.5 TABLET ORAL AT BEDTIME
Refills: 0 | Status: DISCONTINUED | OUTPATIENT
Start: 2025-01-29 | End: 2025-01-31

## 2025-01-29 RX ORDER — ASPIRIN 81 MG/1
81 TABLET, COATED ORAL DAILY
Refills: 0 | Status: DISCONTINUED | OUTPATIENT
Start: 2025-01-30 | End: 2025-01-30

## 2025-01-29 RX ORDER — ATORVASTATIN CALCIUM 80 MG/1
10 TABLET, FILM COATED ORAL AT BEDTIME
Refills: 0 | Status: DISCONTINUED | OUTPATIENT
Start: 2025-01-29 | End: 2025-01-31

## 2025-01-29 RX ORDER — ENOXAPARIN SODIUM 100 MG/ML
110 INJECTION SUBCUTANEOUS ONCE
Refills: 0 | Status: COMPLETED | OUTPATIENT
Start: 2025-01-29 | End: 2025-01-29

## 2025-01-29 RX ORDER — LEVOTHYROXINE SODIUM 25 UG/1
150 TABLET ORAL DAILY
Refills: 0 | Status: DISCONTINUED | OUTPATIENT
Start: 2025-01-30 | End: 2025-01-31

## 2025-01-29 RX ADMIN — ATORVASTATIN CALCIUM 10 MILLIGRAM(S): 80 TABLET, FILM COATED ORAL at 23:16

## 2025-01-29 RX ADMIN — ENOXAPARIN SODIUM 110 MILLIGRAM(S): 100 INJECTION SUBCUTANEOUS at 19:12

## 2025-01-29 NOTE — ED ADULT NURSE NOTE - NSICDXPASTSURGICALHX_GEN_ALL_CORE_FT
Obstetrics Triage  Mima Maki 23 y o  female MRN: [id number]  Unit/Bed#: L&D 329-01 Encounter: [id number]      Assessment/Plan:  23 y o  Drew Phillips [facility] for her obstetric care  Plan of care discussed with Dr Patric Ahmadi  Subjective:  BEN: Estimated Date of Delivery: 5/23/22    HPI Chronology:  23 y o  Drew Phillips at 31w2d  Former patient of [facility] whose care was transferred to [facility] Hermila Wilkerson MD  PGY II, OB/GYN  3/23/2022, 4:37 AM
PAST SURGICAL HISTORY:  History of thyroidectomy 2007    S/P arthroscopy right shoulder repair of ligaments 2013    
I have personally evaluated and examined the patient. The Attending was available to me as a supervising provider if needed.

## 2025-01-29 NOTE — ED PROVIDER NOTE - PROGRESS NOTE DETAILS
Fellow Chon: timmy ordered. pt aware of AC risk. pt to be admitted for tte to eval for RHS. no edobs beds available at this time.

## 2025-01-29 NOTE — H&P ADULT - PROBLEM SELECTOR PLAN 1
Lovenox  TTE Per chart review hx of provoked RLE DVT 2013 (tx with 3 months Warfarin). 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 New onset ELLIS x1 week, CTA w/ bilateral PE with 5-lobe involvement, RV/LV hypertrophy; unable to r/o right heart strain. No obvious provoking factors; hx of prostate/thyroid ca. On admission hemodynamically stable, no supplemental oxygen requirements    - S/p 110mg Lovenox in ED, will c/w Lovenox 110mg BID   - TTE ordered to eval for RHS  - Telemetry monitoring,   - On ASA as outpatient, unclear indication; will hold for now  - Consider heme/onc c/s in AM for hypercoagulability workup -- inpatient vs outpatient Per chart review hx of provoked RLE DVT 2013 (tx with 3 months AC, possibly Warfarin). 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 New onset ELLIS x1 week, CTA w/ bilateral PE with 5-lobe involvement, RV/LV hypertrophy; unable to r/o right heart strain. No obvious provoking factors; hx of prostate/thyroid ca. On admission hemodynamically stable, no supplemental oxygen requirements    - S/p 110mg Lovenox in ED, will c/w Lovenox 110mg BID for now  - TTE ordered to eval for RHS  - Telemetry monitoring,   - On ASA as outpatient, unclear indication; will hold for now  - Consider heme/onc c/s in AM for hypercoagulability workup -- inpatient vs outpatient

## 2025-01-29 NOTE — H&P ADULT - ATTENDING COMMENTS
I have reviewed the labs, imaging and ekg. EKG with NSR HR 84 QTc 463 normal appearing ekg. CTA w/ PE    79M with PMH HTN, HLD, BAUTISTA on CPAP, thyroid cancer s/p thyroidectomy, prostate cancer s/p brachytherapy, DVT 2013 (following traumatic fall, no current AC), varicose veins, melanoma, recent outpatient diagnosis of RLE DVT p/w new ELLIS x1 week, admitted with bilateral PE. Patient states his PMD Dr. Bj Garcia discussed case with his Vascular surgeon Dr. Valverde and it was felt his recent vascular procedure was trigger for DVT. Does not recall what he received as treatment for his prior DVT. Patient denies any prior stent placement.    -Cont. Lovenox BID for now  -TTE  -Telemetry  -Plan to switch to Eliquis if TTE w/o significant R heart strain, assess insurance coverage  -Cont. CPAP/BIPAP QHS inpatient  -Holding BP meds in AM for now  -Patient states he prefers to follow up with his PMD and outpatient providers regarding long term management and work up of VTE. Feels recent vascular procedure was likely trigger  -D/c home asa if on full AC  -DVT PPx, Pt on Lovenox

## 2025-01-29 NOTE — H&P ADULT - TIME BILLING
Need to interview and examine patient, discuss care with family, provide counseling, coordinate care, place orders, document, personally review imaging, ekg and review prior medical records. 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

## 2025-01-29 NOTE — ED ADULT NURSE NOTE - OBJECTIVE STATEMENT
79y Male AOx4 presents to the ED c/o SOB. Pt reports on 1/21 he saw provider and found to have RLE DVT. Denies being started on anticoagulation. Pt reports over the last few days he has been experiencing worsening ELLIS. Spoke with provider again about new symptoms and referred to the ED for CT to r/o PE.  Denies N/V, fever/chills, SOB at rest, chest pain. Spontaneous/unlabored respirations, speaking in full sentences. Side rails up, bed in lowest position, safety maintained.

## 2025-01-29 NOTE — H&P ADULT - ASSESSMENT
79M with PMH HTN, HLD, BAUTISTA on CPAP, thyroid cancer s/p thyroidectomy, prostate cancer s/p brachytherapy, DVT 2013 (following traumatic fall, no current AC), varicose veins, melanoma, recent outpatient diagnosis of RLE DVT p/w new ELLIS x1 week, admitted with bilateral PE, TTE pending to eval for right heart strain    NOTE INCOMPLETE 79M with PMH HTN, HLD, BAUTISTA on CPAP, thyroid cancer s/p thyroidectomy, prostate cancer s/p brachytherapy, DVT 2013 (following traumatic fall, no current AC), melanoma, varicose veins s/p phlebectomy (1/21/25), recent outpatient dx RLE DVT p/w new ELLIS x1 week, admitted for bilateral PE, TTE pending to eval for right heart strain

## 2025-01-29 NOTE — ED PROVIDER NOTE - PHYSICAL EXAMINATION
Refer to MDM Refer to MDM    lungs ctab  normal s1 and s2  no BLE edema  abdomen soft w/o ttp rebound or guarding

## 2025-01-29 NOTE — ED PROVIDER NOTE - NSICDXPASTMEDICALHX_GEN_ALL_CORE_FT
PAST MEDICAL HISTORY:  Asthma     BPH (benign prostatic hypertrophy)     DVT (deep venous thrombosis) left lower leg 2013 tx with coumadin X 3 months    HTN (hypertension)     Hyperlipidemia     Melanoma in situ forehead    Mild asthma     Obese     BAUTISTA on CPAP     Prostate ca seed implants 6/2012    Seasonal allergies     Thyroid cancer 2007, s/p radioactive iodine

## 2025-01-29 NOTE — ED PROVIDER NOTE - RAPID ASSESSMENT
79-year-old male with past medical history of hypertension presenting with shortness of breath.  Patient reports that he was diagnosed with a right lower extremity DVT yesterday.  Patient not yet on anticoagulation.  Patient followed up with his cardiologist today and was endorsing dyspnea on exertion which has been new over the past week.  Patient otherwise denies chest pain.  Patient instructed to come to the ER for a CT angio to rule out PE.    **Patient was rapidly assessed by me, Jd Howard PA-C. A limited history was obtained. The patient will be seen and further examined/worked up in the main ED and their care will be completed by the main ED team. Receiving team will follow up on labs, analgesia, any clinical imaging, and perform reassessment and disposition of the patient as clinically indicated. All decisions regarding the progression of care will be made at their discretion.

## 2025-01-29 NOTE — ED PROVIDER NOTE - OBJECTIVE STATEMENT
Refer to MDM Refer to MDM    Fellow Chon 79M hx provoked dvt in 2013 no longer on AC sent in by outpt MD for PE workup. pt states he has been recovering from a cold and has experienced some sob and chest pain. States he had an outpt procedure in which he was found to have a RLE dvt and was placed on asa 81. no syncope, dizziness, lightheadedness, leg swelling.

## 2025-01-29 NOTE — ED PROVIDER NOTE - NSICDXPASTSURGICALHX_GEN_ALL_CORE_FT
PAST SURGICAL HISTORY:  History of thyroidectomy 2007    S/P arthroscopy right shoulder repair of ligaments 2013

## 2025-01-29 NOTE — ED PROVIDER NOTE - CLINICAL SUMMARY MEDICAL DECISION MAKING FREE TEXT BOX
Attending Anay Barnett MD: 78 yo M with Past medical history of hypertension and hyperlipidemia presents with shortness of breath.  Patient reports that he was diagnosed with a blood clot in his lower extremity a day ago and went to his cardiologist today for evaluation.  He was told to come to the ER to evaluate for pulmonary embolism as he had dyspnea associated with known DVT.  He has not started anticoagulation yet.  He denies chest pain, nausea, vomiting, numbness or tingling.    PE: well appearing, nontoxic, no respiratory distress.  Neuro nonfocal.  Skin intact. Psych normal mood.    MDM: Differential diagnosis includes but is not limited to ACS, PE, URI   Will assess with labs including troponin, EKG, and CTPA Attending Anay Barnett MD: 78 yo M with Past medical history of hypertension and hyperlipidemia presents with shortness of breath.  Patient reports that he was diagnosed with a blood clot in his lower extremity a day ago and went to his cardiologist today for evaluation.  He was told to come to the ER to evaluate for pulmonary embolism as he had dyspnea associated with known DVT.  He has not started anticoagulation yet.  He denies chest pain, nausea, vomiting, numbness or tingling.    PE: well appearing, nontoxic, no respiratory distress.  Neuro nonfocal.  Skin intact. Psych normal mood.    MDM: Differential diagnosis includes but is not limited to ACS, PE, URI   Will assess with labs including troponin, EKG, and CTPA    Fellow Chon: sxs c/f PE in light of recent DVT diagnosis. troponin above normal range, bnp wnl. CTPE with multiple bilateral PEs. started on lovenox. plan for edobs vs admission for TTE as reportedly had RHS on CT imaging. pt made aware of anticoagulation bleeding risk. Attending Anay Barnett MD: 80 yo M with Past medical history of hypertension and hyperlipidemia presents with shortness of breath.  Patient reports that he was diagnosed with a blood clot in his lower extremity a day ago and went to his cardiologist today for evaluation.  He was told to come to the ER to evaluate for pulmonary embolism as he had dyspnea associated with known DVT.  He has not started anticoagulation yet.  He denies chest pain, nausea, vomiting, numbness or tingling.    PE: well appearing, nontoxic, no respiratory distress.  Neuro nonfocal.  Skin intact. Psych normal mood.    MDM: Differential diagnosis includes but is not limited to ACS, PE, URI   Will assess with labs including troponin, EKG, and CTPA  EKG with no ischemic changes, no tachycardia   Fellow Chon: sxs c/f PE in light of recent DVT diagnosis. troponin above normal range, bnp wnl. CTPE with multiple bilateral PEs. started on lovenox. plan for edobs vs admission for TTE as reportedly had RHS on CT imaging. pt made aware of anticoagulation bleeding risk.

## 2025-01-29 NOTE — H&P ADULT - NSHPPHYSICALEXAM_GEN_ALL_CORE
GENERAL: well appearing in no acute distress, non-toxic appearing  HEAD: normocephalic, atraumatic  HEENT: normal conjunctiva, oral mucosa moist  CARDIAC: regular rate and rhythm, normal S1S2, +systolic murmur over 2nd intercostal space  PULM: normal breath sounds, clear to ascultation bilaterally, no rales, rhonchi, wheezing  GI: Abd soft, nondistended, nontender, no rebound tenderness, no guarding, no rigidity  NEURO: no focal motor or sensory deficits, AAOx4  MSK: 1+ b/l peripheral edema, no calf tenderness b/l, varicose veins noted on L  SKIN: +bruising dorsal aspect R hand

## 2025-01-29 NOTE — H&P ADULT - PROBLEM SELECTOR PLAN 2
Home medications: Nifedpine ER 90mg qd, Losartan 50mg qd Home medications: Nifedpine ER 90mg qd, Losartan 50mg qd    - Hold for now, normotensive on admission  - Resume as tolerated Home medications: Nifedipine ER 90mg qd, Losartan 50mg qd    - Hold for now, normotensive on admission  - Resume as tolerated

## 2025-01-29 NOTE — H&P ADULT - PROBLEM SELECTOR PLAN 3
Synthroid 150mcg Thyroid cancer s/p thyroidectomy, now on Synthroid 150mcg    - C/w Levothyroxine 150mcg qd

## 2025-01-29 NOTE — H&P ADULT - PROBLEM SELECTOR PLAN 6
DVT - Lovenox 1mg/kg BID  Diet - regular  Dispo - active DVT - Lovenox 1mg/kg BID  Diet - DASH/TLC  Dispo - active  Code Status - Full Code

## 2025-01-29 NOTE — ED ADULT NURSE NOTE - NSFALLUNIVINTERV_ED_ALL_ED
Bed/Stretcher in lowest position, wheels locked, appropriate side rails in place/Call bell, personal items and telephone in reach/Instruct patient to call for assistance before getting out of bed/chair/stretcher/Non-slip footwear applied when patient is off stretcher/[place] to call system/Physically safe environment - no spills, clutter or unnecessary equipment/Purposeful proactive rounding/Room/bathroom lighting operational, light cord in reach

## 2025-01-29 NOTE — H&P ADULT - NSHPLABSRESULTS_GEN_ALL_CORE
14.9   9.39  )-----------( 294      ( 29 Jan 2025 15:40 )             45.0       01-29    137  |  100  |  20  ----------------------------<  93  4.7   |  25  |  0.95    Ca    9.5      29 Jan 2025 15:40  Mg     2.2     01-29    TPro  7.2  /  Alb  3.8  /  TBili  0.4  /  DBili  x   /  AST  13  /  ALT  6[L]  /  AlkPhos  74  01-29              Urinalysis Basic - ( 29 Jan 2025 15:40 )    Color: x / Appearance: x / SG: x / pH: x  Gluc: 93 mg/dL / Ketone: x  / Bili: x / Urobili: x   Blood: x / Protein: x / Nitrite: x   Leuk Esterase: x / RBC: x / WBC x   Sq Epi: x / Non Sq Epi: x / Bacteria: x        PT/INR - ( 29 Jan 2025 19:30 )   PT: 11.3 sec;   INR: 0.98 ratio         PTT - ( 29 Jan 2025 19:30 )  PTT:31.8 sec    Lactate Trend            CAPILLARY BLOOD GLUCOSE

## 2025-01-29 NOTE — ED ADULT TRIAGE NOTE - CHIEF COMPLAINT QUOTE
Pt has had shortness of breath for past several days. Pt went to clinic today where they saw DVT in right lower leg. Sent to ED to rule out PE.

## 2025-01-29 NOTE — H&P ADULT - HISTORY OF PRESENT ILLNESS
79M with PMH HTN, HLD, BAUTISTA on CPAP, thyroid cancer s/p thyroidectomy, prostate cancer s/p brachytherapy, DVT 2013 (following traumatic fall, no current AC), varicose veins, melanoma presenting for dyspnea on exertion. Pt follows w/ outpatient vascular surgery Dr. Valverde for varicose veins; underwent right stab phlebectomy and sclerotherapy on 1/21. Presented for outpatient follow-up; post-procedure ultrasound demonstrated subacute-on-chronic occlusive thrombus within 2/4 R gastrocnemius veins per outpatient documentation. Pt also noted new onset of dyspnea of exertion over the past week, and was instructed to present to ED for further evaluation. +Cold symptoms at beginning of month, otherwise pt denies chest pain, headaches, subjective fever/chills, n/v, abdominal pain, constipation/diarrhea, decreased appetite, dysuria. +B/l LE edema, chronic and unsure if acutely worsened. Lives at home w/ wife. No smoking, EtOH use approx 3 drinks/3x weekly.    ED Course: VS - Afebrile, HR 80s, ->130, DBP 77-82, O2 Sat 96% on RA. CBC, CMP, Coags, CMP unremarkable, Trop 25->25. CT angio w/ bilateral pulmonary emboli with involvement of all 5 lobes, as well as RV/LV hypertrophy; unable to r/o right heart strain. S/p Lovenox 110mg. Admitted to medicine for further management 79M with PMH HTN, HLD, BAUTISTA on CPAP, thyroid cancer s/p thyroidectomy, prostate cancer s/p brachytherapy, DVT 2013 (following traumatic fall, no current AC), varicose veins, melanoma presenting for dyspnea on exertion. Pt follows w/ outpatient vascular surgery Dr. Valverde; underwent right stab phlebectomy and sclerotherapy of R varicose veins 1/21. Presented for outpatient follow-up; post-procedure ultrasound demonstrated subacute-on-chronic occlusive thrombus within 2/4 R gastrocnemius veins per outpatient documentation. Pt also noted new onset of dyspnea on exertion over the past week, and was instructed to present to ED for further evaluation. +Cold symptoms at beginning of month, otherwise pt denies chest pain, headaches, subjective fever/chills, n/v, abdominal pain, constipation/diarrhea, decreased appetite, dysuria. +B/l LE edema, chronic and unsure if acutely worsened. Lives at home w/ wife. No smoking, EtOH use approx 3 drinks/3x weekly.    ED Course: VS - Afebrile, HR 80s, ->130, DBP 77-82, O2 Sat 96% on RA. CBC, CMP, Coags, CMP unremarkable, Trop 25->25. CT angio w/ bilateral pulmonary emboli with involvement of all 5 lobes, as well as RV/LV hypertrophy; unable to r/o right heart strain. S/p Lovenox 110mg. Admitted to medicine for further management

## 2025-01-30 ENCOUNTER — TRANSCRIPTION ENCOUNTER (OUTPATIENT)
Age: 80
End: 2025-01-30

## 2025-01-30 ENCOUNTER — RESULT REVIEW (OUTPATIENT)
Age: 80
End: 2025-01-30

## 2025-01-30 PROCEDURE — 99233 SBSQ HOSP IP/OBS HIGH 50: CPT | Mod: GC

## 2025-01-30 PROCEDURE — 93356 MYOCRD STRAIN IMG SPCKL TRCK: CPT

## 2025-01-30 PROCEDURE — 93306 TTE W/DOPPLER COMPLETE: CPT | Mod: 26

## 2025-01-30 PROCEDURE — 93970 EXTREMITY STUDY: CPT | Mod: 26

## 2025-01-30 RX ORDER — LOSARTAN POTASSIUM 100 MG
50 TABLET ORAL DAILY
Refills: 0 | Status: DISCONTINUED | OUTPATIENT
Start: 2025-01-30 | End: 2025-01-31

## 2025-01-30 RX ORDER — APIXABAN 5 MG/1
1 TABLET, FILM COATED ORAL
Qty: 74 | Refills: 0
Start: 2025-01-30 | End: 2025-02-28

## 2025-01-30 RX ORDER — ASPIRIN 81 MG/1
81 TABLET, COATED ORAL DAILY
Refills: 0 | Status: DISCONTINUED | OUTPATIENT
Start: 2025-01-30 | End: 2025-01-31

## 2025-01-30 RX ORDER — NIFEDIPINE 90 MG/1
90 TABLET, FILM COATED, EXTENDED RELEASE ORAL DAILY
Refills: 0 | Status: DISCONTINUED | OUTPATIENT
Start: 2025-01-30 | End: 2025-01-31

## 2025-01-30 RX ORDER — ENOXAPARIN SODIUM 100 MG/ML
110 INJECTION SUBCUTANEOUS EVERY 12 HOURS
Refills: 0 | Status: DISCONTINUED | OUTPATIENT
Start: 2025-01-30 | End: 2025-01-31

## 2025-01-30 RX ADMIN — ENOXAPARIN SODIUM 110 MILLIGRAM(S): 100 INJECTION SUBCUTANEOUS at 06:04

## 2025-01-30 RX ADMIN — ASPIRIN 81 MILLIGRAM(S): 81 TABLET, COATED ORAL at 12:53

## 2025-01-30 RX ADMIN — ENOXAPARIN SODIUM 110 MILLIGRAM(S): 100 INJECTION SUBCUTANEOUS at 17:49

## 2025-01-30 RX ADMIN — LEVOTHYROXINE SODIUM 150 MICROGRAM(S): 25 TABLET ORAL at 06:04

## 2025-01-30 RX ADMIN — ATORVASTATIN CALCIUM 10 MILLIGRAM(S): 80 TABLET, FILM COATED ORAL at 21:22

## 2025-01-30 NOTE — DISCHARGE NOTE PROVIDER - NSDCCPTREATMENT_GEN_ALL_CORE_FT
PRINCIPAL PROCEDURE  Procedure: CTA chest w/w/o contrast  Findings and Treatment: FINDINGS:  LUNGS AND LARGE AIRWAYS: Patent central airways. No parenchymal   consolidation or pneumothorax. Bibasilar subsegmental atelectasis. 3 mm   right lower lobe nodule on image 60 of series 301.  PLEURA: No pleural effusion.  VESSELS: Pulmonary arterial emboli within the left upper lobe segmental   and lingular lobar and segmental pulmonary arterial branches. Pulmonary   arterial emboli within the left lower lobe subsegmental pulmonary   arterial branches although evaluation is limited due to respiratory   motion artifact.  Pulmonary arterial emboli within the right upper, middle and lower lobar,   segmental and subsegmental pulmonary arterial branches.  The main pulmonary artery measures about 3.2 cm.  HEART: Suggestion of left ventricular hypertrophy. Right ventricle also   appears hypertrophied. The right heart chambers are prominent. Left   atrium measures 4.5 cm. No pericardial effusion.  MEDIASTINUM AND MT: No lymphadenopathy.  CHEST WALL AND LOWER NECK: Within normal limits.  VISUALIZED UPPER ABDOMEN: Hypodensities within the pancreas described on   the MRI of November 19, 2024 are not well evaluated due to the timing of   contrast opacification. Colonic diverticulosis. Aortic intimal   calcifications.  BONES: Degenerativechanges.  IMPRESSION:  Bilateral pulmonary arterial emboli with involvement of all 5 lobes.   Prominence of the right heart chambers are of indeterminate age. Right   heart strain cannot be excluded. Correlation with echocardiography is   recommended.

## 2025-01-30 NOTE — DISCHARGE NOTE PROVIDER - NSDCCPCAREPLAN_GEN_ALL_CORE_FT
PRINCIPAL DISCHARGE DIAGNOSIS  Diagnosis: Pulmonary embolism  Assessment and Plan of Treatment: You came to the ED after having shortness of breath, after a procedure that was done for your right leg varicose veins; outpatient you were found to have a DVT (deep venous thrombus, or blood clot) in the your right leg. Afterwards, you were short of breath, and your doctor was concerned so you were advised to come the ED. While in the emergency room, you had a CTA of your chest, which showed a PE (pulmonary embolus) or, clot in your lung, which likely was making you short of breath when walking. You were given blood thinner medicine injections (Lovenox) while in the hospital. and you are being discharged on Eliquis. You will take 10mg twice a day for 7 days, and after that you will take 5mg twice a day.   Please follow up with your PCP, and vascular surgeon within one week of discharge. They will determine if you need to see a hematologist since this is your 2nd time getting a blood clot, and you might need lifelong blood thinner medicine.  If at any point you develop shortness of breath, swelling in your legs, chest pain, confusion, or loss of conciousness, please seek emergent care in the ED.

## 2025-01-30 NOTE — PROGRESS NOTE ADULT - PROBLEM SELECTOR PLAN 1
Per chart review hx of provoked RLE DVT 2013 (tx with 3 months AC, possibly Warfarin). 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 New onset ELLIS x1 week, CTA w/ bilateral PE with 5-lobe involvement, RV/LV hypertrophy; unable to r/o right heart strain. No obvious provoking factors; hx of prostate/thyroid ca. On admission hemodynamically stable, no supplemental oxygen requirements    - S/p 110mg Lovenox in ED, will c/w Lovenox 110mg BID for now  - TTE ordered to eval for RHS  - Telemetry monitoring,   - On ASA as outpatient, unclear indication; will hold for now  - Consider heme/onc c/s in AM for hypercoagulability workup -- inpatient vs outpatient Per chart review hx of provoked RLE DVT 2013 (tx with 3 months AC, possibly Warfarin). 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 New onset ELLIS x1 week, CTA w/ bilateral PE with 5-lobe involvement, RV/LV hypertrophy; unable to r/o right heart strain. No obvious provoking factors; hx of prostate/thyroid ca. On admission hemodynamically stable, no supplemental oxygen requirements    - S/p 110mg Lovenox in ED, will c/w Lovenox 110mg BID for now  - TTE ordered to eval for RHS  - Telemetry monitoring,   - On ASA as outpatient, unclear indication; can continue

## 2025-01-30 NOTE — CONSULT NOTE ADULT - ATTENDING COMMENTS
Pt known  Called re pt in the hosp today ~13:00  H/O CVI  S/P R Leg phlebs and USGFST on 1/21/25.  Dev'ed SOB/ELLIS few days later.  No LE c/o  Now asymp  LEs S/NT, No edema, R LE procedure sites ok.  On AC  vUS noted: acute R gastroc DVT.  CRPA: b/l PEs    A/P:   R DVT/PEs  s/p R LE intervention    Rec:   Cont AC (3-6 months or longer if additional med/heme indications)  LE elevation  Compression stockings post DC  Fu in the office Pt known  Called re pt in the hosp today ~13:00  H/O CVI  S/P R Leg phlebs and USGFST on 1/21/25.  Dev'ed SOB/ELLIS few days later.  No LE c/o  Now asymp  LEs S/NT, Mild edema b/l at baseline, CVI skin changes, R LE procedure sites ok.  On AC  vUS noted: acute R gastroc DVT.  CRPA: b/l PEs    A/P:   R DVT/PEs  s/p R LE intervention    Rec:   Cont AC (3-6 months or longer if additional med/heme indications)  LE elevation  Compression stockings post DC  Fu in the office

## 2025-01-30 NOTE — DISCHARGE NOTE PROVIDER - NSDCMRMEDTOKEN_GEN_ALL_CORE_FT
ALPRAZolam 0.5 mg oral tablet: 1 tab(s) orally once a day as needed for  anxiety  aspirin 81 mg oral tablet: 1 tab(s) orally once a day, last dose 4/15/2021  calcium: 1 tab(s) orally once a day  Centrum Silver oral tablet: 1 tab(s) orally once a day, last dose 4/15/2021  Eliquis Starter Pack for Treatment of DVT and PE 5 mg oral tablet: 1 tab(s) orally 2 times a day  Lipitor 10 mg oral tablet: 1 tab(s) orally once a day (at bedtime)  losartan 50 mg oral tablet: 1 tab(s) orally once a day  NIFEdipine 90 mg oral tablet, extended release: 1 tab(s) orally once a day  Synthroid 150 mcg (0.15 mg) oral tablet: 1 tab(s) orally once a day

## 2025-01-30 NOTE — DISCHARGE NOTE PROVIDER - HOSPITAL COURSE
HPI:  79M with PMH HTN, HLD, BAUTISTA on CPAP, thyroid cancer s/p thyroidectomy, prostate cancer s/p brachytherapy, DVT 2013 (following traumatic fall, no current AC), varicose veins, melanoma presenting for dyspnea on exertion. Pt follows w/ outpatient vascular surgery Dr. Valverde; underwent right stab phlebectomy and sclerotherapy of R varicose veins 1/21. Presented for outpatient follow-up; post-procedure ultrasound demonstrated subacute-on-chronic occlusive thrombus within 2/4 R gastrocnemius veins per outpatient documentation. Pt also noted new onset of dyspnea on exertion over the past week, and was instructed to present to ED for further evaluation. +Cold symptoms at beginning of month, otherwise pt denies chest pain, headaches, subjective fever/chills, n/v, abdominal pain, constipation/diarrhea, decreased appetite, dysuria. +B/l LE edema, chronic and unsure if acutely worsened. Lives at home w/ wife. No smoking, EtOH use approx 3 drinks/3x weekly.    ED Course: VS - Afebrile, HR 80s, ->130, DBP 77-82, O2 Sat 96% on RA. CBC, CMP, Coags, CMP unremarkable, Trop 25->25. CT angio w/ bilateral pulmonary emboli with involvement of all 5 lobes, as well as RV/LV hypertrophy; unable to r/o right heart strain. S/p Lovenox 110mg. Admitted to medicine for further management (29 Jan 2025 23:24)    Hospital Course: Patient was admitted to medicine floor after it was identified that he had a bilateral PE affecting all 5 lobes of his lungs. Patient was monitored for hypoxia and tachycardia, and his vitals remained stable. Patient had a venous duplex performed which redemonstrated known right DVT. Patient was started on therapeutic dose Lovenox while in the hospital. TTE was done which did not demonstrate right heart strain, and ambulatory oxygen saturation did not drop after ambulation. Patient was discharged on Eliquis, and will follow up with PCP, and vascular surgery.     Important Medication Changes and Reason:  - Eliquis 10mg BID for 7 days, followed by 5mg BID     Active or Pending Issues Requiring Follow-up:  - Consider f/u outpatient heme for hypercoagulability     Advanced Directives:   [X] Full code  [ ] DNR  [ ] Hospice    Discharge Diagnoses:  - DVT and PE          HPI:  Patient is a 79M with PMH HTN, HLD, BAUTISTA on CPAP, thyroid cancer s/p thyroidectomy, prostate cancer s/p brachytherapy, DVT 2013 (following traumatic fall, treated with AC for 6 months, currently not on AC), varicose veins, melanoma presenting for dyspnea on exertion. Pt follows w/ outpatient vascular surgery Dr. Paul Valverde; underwent right stab phlebectomy and sclerotherapy of R varicose veins 1/21/25. Presented for outpatient follow-up; post-procedure ultrasound demonstrated subacute-on-chronic occlusive thrombus within 2/4 R gastrocnemius veins per outpatient documentation. Pt also noted new onset of dyspnea on exertion over the past week, and was instructed to present to ED for further evaluation. He c/o some cold symptoms at beginning of month, otherwise pt denies chest pain, headaches, subjective fever/chills, n/v, abdominal pain, constipation/diarrhea, decreased appetite, dysuria. He denies recent long journey and lower extremities are stable from swelling post procedure. Lives at home w/ wife. No smoking, EtOH use approx 3 drinks/3x weekly.    ED Course: VS - Afebrile, HR 80s, ->130, DBP 77-82, O2 Sat 96% on RA. CBC, CMP, Coags, CMP unremarkable, Trop 25->25. CT angio w/ bilateral pulmonary emboli with involvement of all 5 lobes, as well as RV/LV hypertrophy; unable to r/o right heart strain. He remained hemodynamically stable and started on Lovenox 110mg sc q 12 hrs.    Hospital Course:   Patient was admitted to Telemetry for close monitoring on Lovenox 110mg sc q 12 hrs. PE probably was provoked given recent LE procedure. Remained hemodynamically stable. His O2 sat remained more than 96% on RA, was not tachycardic. Venous duplex of lower extremities redemonstrated known RLE distl DVT ar gastrocnemius vein. TTE revealed normal LV/RV function with no right heart strain. He was evaluated by Vascular surgeon- Dr Paul Valverde who agreed with plans. Patient was discharged on therapeutic dose of Eliquis 10mg bid for 7 days and then 5mg bid. He was instructed to follow up with his PCP- Dr jB Garcia (was notified), and vascular surgery in 1-2 weeks.     Important Medication Changes and Reason:  - Eliquis 10mg BID for 7 days, followed by 5mg BID     Active or Pending Issues Requiring Follow-up:  - Consider f/u outpatient Heme for hypercoagulability work-up given second episode of DVT/PEs.    Advanced Directives:   [X] Full code  [ ] DNR  [ ] Hospice    Discharge Diagnoses:  - DVT and PE

## 2025-01-30 NOTE — DISCHARGE NOTE PROVIDER - NSDCFUADDAPPT_GEN_ALL_CORE_FT
APPTS ARE READY TO BE MADE: [ ] YES    Best Family or Patient Contact (if needed):    Additional Information about above appointments (if needed):    1: Your PCP, Dr. Garcia, within one week of discharge   2: Vascular surgery, as scheduled on 02/04/25  3: Hematology, within one month of discharge

## 2025-01-30 NOTE — PROGRESS NOTE ADULT - SUBJECTIVE AND OBJECTIVE BOX
***************************************************************  Orestes Blackmon  (PGY1) Internal Medicine  On TEAMS  ***************************************************************    PROGRESS NOTE:     Patient is a 79y old  Male who presents with a chief complaint of bilateral PE (29 Jan 2025 23:24)        INTERVAL EVENTS:   SUBJECTIVE / OVERNIGHT EVENTS: No acute overnight events. Patient was seen and examined by the bedside this AM.   OXYGEN:   TELEMETRY:       MEDICATIONS  (STANDING):  atorvastatin 10 milliGRAM(s) Oral at bedtime  enoxaparin Injectable 110 milliGRAM(s) SubCutaneous every 12 hours  levothyroxine 150 MICROGram(s) Oral daily    MEDICATIONS  (PRN):  ALPRAZolam 0.5 milliGRAM(s) Oral at bedtime PRN for anxiety      CAPILLARY BLOOD GLUCOSE        I&O's Summary      PHYSICAL EXAM:  Vital Signs Last 24 Hrs  T(C): 36.7 (30 Jan 2025 04:30), Max: 36.9 (29 Jan 2025 17:35)  T(F): 98 (30 Jan 2025 04:30), Max: 98.4 (29 Jan 2025 17:35)  HR: 80 (30 Jan 2025 05:35) (77 - 87)  BP: 118/68 (30 Jan 2025 04:30) (114/74 - 130/82)  BP(mean): --  RR: 18 (30 Jan 2025 04:30) (16 - 20)  SpO2: 96% (30 Jan 2025 05:35) (95% - 96%)    Parameters below as of 30 Jan 2025 04:30  Patient On (Oxygen Delivery Method): room air        PHYSICAL EXAM:  GENERAL: NAD, well-groomed, well-developed  HEAD:  Atraumatic, Normocephalic  EYES: EOMI, PERRLA, conjunctiva and sclera clear  ENMT: No tonsillar erythema, exudates, or enlargement; Moist mucous membranes, Good dentition, No lesions  NECK: Supple, No JVD, Normal thyroid  NERVOUS SYSTEM:  Alert & Oriented X3, Good concentration; Motor Strength 5/5 B/L upper and lower extremities; DTRs 2+ intact and symmetric  CHEST/LUNG: Clear to auscultation bilaterally; No rales, rhonchi, wheezing, or rubs  HEART: Regular rate and rhythm; No murmurs, rubs, or gallops  ABDOMEN: Soft, Nontender, Nondistended; Bowel sounds present  EXTREMITIES:  2+ Peripheral Pulses, No clubbing, cyanosis, or edema  LYMPH: No lymphadenopathy noted  SKIN: No rashes or lesions    LABS:                        14.9   9.39  )-----------( 294      ( 29 Jan 2025 15:40 )             45.0     01-29    137  |  100  |  20  ----------------------------<  93  4.7   |  25  |  0.95    Ca    9.5      29 Jan 2025 15:40  Mg     2.2     01-29    TPro  7.2  /  Alb  3.8  /  TBili  0.4  /  DBili  x   /  AST  13  /  ALT  6[L]  /  AlkPhos  74  01-29    PT/INR - ( 29 Jan 2025 19:30 )   PT: 11.3 sec;   INR: 0.98 ratio         PTT - ( 29 Jan 2025 19:30 )  PTT:31.8 sec      Urinalysis Basic - ( 29 Jan 2025 15:40 )    Color: x / Appearance: x / SG: x / pH: x  Gluc: 93 mg/dL / Ketone: x  / Bili: x / Urobili: x   Blood: x / Protein: x / Nitrite: x   Leuk Esterase: x / RBC: x / WBC x   Sq Epi: x / Non Sq Epi: x / Bacteria: x          COORDINATION OF CARE:  Care Discussed with Consultants/Other Providers [Y/N]:  Prior or Outpatient Records Reviewed [Y/N]: ***************************************************************  Orestes Blackmon  (PGY1) Internal Medicine  On TEAMS  ***************************************************************    PROGRESS NOTE:     Patient is a 79y old  Male who presents with a chief complaint of bilateral PE (29 Jan 2025 23:24)        INTERVAL EVENTS:   SUBJECTIVE / OVERNIGHT EVENTS: No acute overnight events. Patient was seen and examined by the bedside this AM. Patient endorses feeling well. Mentioned having provoked DVT's treated with ac in the past. Last week he had dyspnea on ambulation, otherwise no acute medical complaints this morning.     MEDICATIONS  (STANDING):  atorvastatin 10 milliGRAM(s) Oral at bedtime  enoxaparin Injectable 110 milliGRAM(s) SubCutaneous every 12 hours  levothyroxine 150 MICROGram(s) Oral daily    MEDICATIONS  (PRN):  ALPRAZolam 0.5 milliGRAM(s) Oral at bedtime PRN for anxiety      CAPILLARY BLOOD GLUCOSE        I&O's Summary      PHYSICAL EXAM:  Vital Signs Last 24 Hrs  T(C): 36.7 (30 Jan 2025 04:30), Max: 36.9 (29 Jan 2025 17:35)  T(F): 98 (30 Jan 2025 04:30), Max: 98.4 (29 Jan 2025 17:35)  HR: 80 (30 Jan 2025 05:35) (77 - 87)  BP: 118/68 (30 Jan 2025 04:30) (114/74 - 130/82)  BP(mean): --  RR: 18 (30 Jan 2025 04:30) (16 - 20)  SpO2: 96% (30 Jan 2025 05:35) (95% - 96%)    Parameters below as of 30 Jan 2025 04:30  Patient On (Oxygen Delivery Method): room air        PHYSICAL EXAM:  GENERAL: NAD, well-groomed, well-developed  HEAD:  Atraumatic, Normocephalic  EYES: EOMI, PERRLA, conjunctiva and sclera clear  ENMT: No tonsillar erythema, exudates, or enlargement  NECK: Supple, No JVD, Normal thyroid  NERVOUS SYSTEM:  Alert & Oriented X3, Good concentration  CHEST/LUNG: Clear to auscultation bilaterally; No rales, rhonchi, wheezing, or rubs  HEART: Regular rate and rhythm; No murmurs, rubs, or gallops  ABDOMEN: Soft, Nontender, Nondistended; Bowel sounds present  EXTREMITIES: No clubbing, cyanosis, or edema  LYMPH: No lymphadenopathy noted  SKIN: No rashes or lesions    LABS:                        14.9   9.39  )-----------( 294      ( 29 Jan 2025 15:40 )             45.0     01-29    137  |  100  |  20  ----------------------------<  93  4.7   |  25  |  0.95    Ca    9.5      29 Jan 2025 15:40  Mg     2.2     01-29    TPro  7.2  /  Alb  3.8  /  TBili  0.4  /  DBili  x   /  AST  13  /  ALT  6[L]  /  AlkPhos  74  01-29    PT/INR - ( 29 Jan 2025 19:30 )   PT: 11.3 sec;   INR: 0.98 ratio         PTT - ( 29 Jan 2025 19:30 )  PTT:31.8 sec      Urinalysis Basic - ( 29 Jan 2025 15:40 )    Color: x / Appearance: x / SG: x / pH: x  Gluc: 93 mg/dL / Ketone: x  / Bili: x / Urobili: x   Blood: x / Protein: x / Nitrite: x   Leuk Esterase: x / RBC: x / WBC x   Sq Epi: x / Non Sq Epi: x / Bacteria: x          COORDINATION OF CARE:  Care Discussed with Consultants/Other Providers [Y/N]:  Prior or Outpatient Records Reviewed [Y/N]:

## 2025-01-30 NOTE — DISCHARGE NOTE PROVIDER - NSDCFUSCHEDAPPT_GEN_ALL_CORE_FT
[facility]  VASCULAR [address]  Scheduled Appointment: 02/04/2025    Jie Gramajo  [facility]  VASCULAR [address]  Scheduled Appointment: 02/14/2025    Terry Sepulveda  [facility]  SURGONC [address] [place] [address]  Scheduled Appointment: 02/17/2025

## 2025-01-30 NOTE — PROGRESS NOTE ADULT - PROBLEM SELECTOR PLAN 2
Home medications: Nifedipine ER 90mg qd, Losartan 50mg qd    - Hold for now, normotensive on admission  - Resume as tolerated

## 2025-01-31 ENCOUNTER — TRANSCRIPTION ENCOUNTER (OUTPATIENT)
Age: 80
End: 2025-01-31

## 2025-01-31 VITALS
SYSTOLIC BLOOD PRESSURE: 122 MMHG | DIASTOLIC BLOOD PRESSURE: 83 MMHG | RESPIRATION RATE: 18 BRPM | OXYGEN SATURATION: 97 % | TEMPERATURE: 98 F | HEART RATE: 81 BPM

## 2025-01-31 LAB
ALBUMIN SERPL ELPH-MCNC: 3.2 G/DL — LOW (ref 3.3–5)
ALP SERPL-CCNC: 59 U/L — SIGNIFICANT CHANGE UP (ref 40–120)
ALT FLD-CCNC: 6 U/L — LOW (ref 10–45)
ANION GAP SERPL CALC-SCNC: 11 MMOL/L — SIGNIFICANT CHANGE UP (ref 5–17)
AST SERPL-CCNC: 15 U/L — SIGNIFICANT CHANGE UP (ref 10–40)
BILIRUB SERPL-MCNC: 0.6 MG/DL — SIGNIFICANT CHANGE UP (ref 0.2–1.2)
BUN SERPL-MCNC: 17 MG/DL — SIGNIFICANT CHANGE UP (ref 7–23)
CALCIUM SERPL-MCNC: 8.6 MG/DL — SIGNIFICANT CHANGE UP (ref 8.4–10.5)
CHLORIDE SERPL-SCNC: 101 MMOL/L — SIGNIFICANT CHANGE UP (ref 96–108)
CO2 SERPL-SCNC: 22 MMOL/L — SIGNIFICANT CHANGE UP (ref 22–31)
CREAT SERPL-MCNC: 0.88 MG/DL — SIGNIFICANT CHANGE UP (ref 0.5–1.3)
EGFR: 88 ML/MIN/1.73M2 — SIGNIFICANT CHANGE UP
GLUCOSE SERPL-MCNC: 87 MG/DL — SIGNIFICANT CHANGE UP (ref 70–99)
HCT VFR BLD CALC: 40.3 % — SIGNIFICANT CHANGE UP (ref 39–50)
HGB BLD-MCNC: 13.6 G/DL — SIGNIFICANT CHANGE UP (ref 13–17)
MAGNESIUM SERPL-MCNC: 2.2 MG/DL — SIGNIFICANT CHANGE UP (ref 1.6–2.6)
MCHC RBC-ENTMCNC: 31.8 PG — SIGNIFICANT CHANGE UP (ref 27–34)
MCHC RBC-ENTMCNC: 33.7 G/DL — SIGNIFICANT CHANGE UP (ref 32–36)
MCV RBC AUTO: 94.2 FL — SIGNIFICANT CHANGE UP (ref 80–100)
NRBC # BLD: 0 /100 WBCS — SIGNIFICANT CHANGE UP (ref 0–0)
NRBC BLD-RTO: 0 /100 WBCS — SIGNIFICANT CHANGE UP (ref 0–0)
PHOSPHATE SERPL-MCNC: 3.1 MG/DL — SIGNIFICANT CHANGE UP (ref 2.5–4.5)
PLATELET # BLD AUTO: 271 K/UL — SIGNIFICANT CHANGE UP (ref 150–400)
POTASSIUM SERPL-MCNC: 4.2 MMOL/L — SIGNIFICANT CHANGE UP (ref 3.5–5.3)
POTASSIUM SERPL-SCNC: 4.2 MMOL/L — SIGNIFICANT CHANGE UP (ref 3.5–5.3)
PROT SERPL-MCNC: 6 G/DL — SIGNIFICANT CHANGE UP (ref 6–8.3)
RBC # BLD: 4.28 M/UL — SIGNIFICANT CHANGE UP (ref 4.2–5.8)
RBC # FLD: 12.5 % — SIGNIFICANT CHANGE UP (ref 10.3–14.5)
SODIUM SERPL-SCNC: 134 MMOL/L — LOW (ref 135–145)
WBC # BLD: 6.66 K/UL — SIGNIFICANT CHANGE UP (ref 3.8–10.5)
WBC # FLD AUTO: 6.66 K/UL — SIGNIFICANT CHANGE UP (ref 3.8–10.5)

## 2025-01-31 PROCEDURE — 36415 COLL VENOUS BLD VENIPUNCTURE: CPT

## 2025-01-31 PROCEDURE — 85027 COMPLETE CBC AUTOMATED: CPT

## 2025-01-31 PROCEDURE — 80053 COMPREHEN METABOLIC PANEL: CPT

## 2025-01-31 PROCEDURE — 84484 ASSAY OF TROPONIN QUANT: CPT

## 2025-01-31 PROCEDURE — 93306 TTE W/DOPPLER COMPLETE: CPT

## 2025-01-31 PROCEDURE — 85610 PROTHROMBIN TIME: CPT

## 2025-01-31 PROCEDURE — 71275 CT ANGIOGRAPHY CHEST: CPT | Mod: MC

## 2025-01-31 PROCEDURE — 82803 BLOOD GASES ANY COMBINATION: CPT

## 2025-01-31 PROCEDURE — 85018 HEMOGLOBIN: CPT

## 2025-01-31 PROCEDURE — 85014 HEMATOCRIT: CPT

## 2025-01-31 PROCEDURE — 83605 ASSAY OF LACTIC ACID: CPT

## 2025-01-31 PROCEDURE — 82435 ASSAY OF BLOOD CHLORIDE: CPT

## 2025-01-31 PROCEDURE — 99285 EMERGENCY DEPT VISIT HI MDM: CPT | Mod: 25

## 2025-01-31 PROCEDURE — 84100 ASSAY OF PHOSPHORUS: CPT

## 2025-01-31 PROCEDURE — 82947 ASSAY GLUCOSE BLOOD QUANT: CPT

## 2025-01-31 PROCEDURE — 82330 ASSAY OF CALCIUM: CPT

## 2025-01-31 PROCEDURE — 93970 EXTREMITY STUDY: CPT

## 2025-01-31 PROCEDURE — 94660 CPAP INITIATION&MGMT: CPT

## 2025-01-31 PROCEDURE — 83880 ASSAY OF NATRIURETIC PEPTIDE: CPT

## 2025-01-31 PROCEDURE — 83735 ASSAY OF MAGNESIUM: CPT

## 2025-01-31 PROCEDURE — 84295 ASSAY OF SERUM SODIUM: CPT

## 2025-01-31 PROCEDURE — 85730 THROMBOPLASTIN TIME PARTIAL: CPT

## 2025-01-31 PROCEDURE — 84132 ASSAY OF SERUM POTASSIUM: CPT

## 2025-01-31 PROCEDURE — 99239 HOSP IP/OBS DSCHRG MGMT >30: CPT

## 2025-01-31 PROCEDURE — 93356 MYOCRD STRAIN IMG SPCKL TRCK: CPT

## 2025-01-31 PROCEDURE — 85025 COMPLETE CBC W/AUTO DIFF WBC: CPT

## 2025-01-31 RX ORDER — ENOXAPARIN SODIUM 100 MG/ML
55 INJECTION SUBCUTANEOUS ONCE
Refills: 0 | Status: COMPLETED | OUTPATIENT
Start: 2025-01-31 | End: 2025-01-31

## 2025-01-31 RX ADMIN — LEVOTHYROXINE SODIUM 150 MICROGRAM(S): 25 TABLET ORAL at 06:05

## 2025-01-31 RX ADMIN — Medication 50 MILLIGRAM(S): at 06:05

## 2025-01-31 RX ADMIN — ENOXAPARIN SODIUM 110 MILLIGRAM(S): 100 INJECTION SUBCUTANEOUS at 06:05

## 2025-01-31 RX ADMIN — ENOXAPARIN SODIUM 55 MILLIGRAM(S): 100 INJECTION SUBCUTANEOUS at 08:39

## 2025-01-31 RX ADMIN — Medication 0.5 MILLIGRAM(S): at 00:15

## 2025-01-31 RX ADMIN — ASPIRIN 81 MILLIGRAM(S): 81 TABLET, COATED ORAL at 08:39

## 2025-01-31 RX ADMIN — NIFEDIPINE 90 MILLIGRAM(S): 90 TABLET, FILM COATED, EXTENDED RELEASE ORAL at 06:05

## 2025-01-31 NOTE — PROGRESS NOTE ADULT - ATTENDING COMMENTS
Patient is a 79M with h/o DVT in 2013, s/p treatement, HTN, HLD, BAUTISTA on CPAP, thyroid cancer s/p thyroidectomy, prostate cancer s/p brachytherapy, DVT 2013 (following traumatic fall, no current AC), varicose veins, melanoma, recently outpatient diagnosis of distal RLE DVT p/w new ELLIS x1 week.    In ED he remains afebrile, VSS, CTA chest- segmental B/L PEs, no heart strains, started on Lovenox 1mg/kg sc q 12 hrs,    1. B/L segmental PEs, provoked  2. Recent RLE distal DVT post procedure ( h/o RLE DVT in 2013)  3. BAUTISTA on CPAP  4. prostate cancer s/p brachytherapy    - Stable vitals, O2 sat 98% RA, no chest pain but c/o exertional dyspnea   - Reviewed CTA chest, labs, agree with Lovenox 1mg/k sc q 12 hrs for now  - Echo, doppler LE ordered  - Notified his Vascular Sx- Dr Valverde about plans  - c/w all his other meds, CPAP qhs  - If stable d/c on Eliquis
Patient is a 79M with h/o DVT in 2013, s/p treatement, HTN, HLD, BAUTISTA on CPAP, thyroid cancer s/p thyroidectomy, prostate cancer s/p brachytherapy, DVT 2013 (following traumatic fall, no current AC), varicose veins, melanoma, recently outpatient diagnosis of distal RLE DVT p/w new ELLIS x1 week.    In ED he remains afebrile, VSS, CTA chest- segmental B/L PEs, no heart strains, started on Lovenox 1mg/kg sc q 12 hrs,    1. B/L segmental PEs, provoked  2. Recent RLE distal DVT post procedure ( h/o RLE DVT in 2013)  3. BAUTISTA on CPAP  4. prostate cancer s/p brachytherapy    - Stable vitals, O2 sat 98% RA on ambulation, no chest pain no acute SOB, VSS  - Reviewed CTA chest, labs, agree with Lovenox 1mg/k sc q 12 hrs for now  - Echo revealed Normal RV/LV functions w no R heart strains  - doppler LE shows R distal DVT ( gastrocnemius)   - appreciated Vascular Sx rec from Dr Powell, agreed with Plans- outpatient f/u  - d/c today on Eliquis 10mg bid for 7 day, then 5mg bid 6 months, may need life long given recurrent PE/DVT  - c/w all his other meds, CPAP qhs  - spoke to his PCP- Dr Kale Garcia    Time spent 45 min excluding house staff teaching

## 2025-01-31 NOTE — PROGRESS NOTE ADULT - PROBLEM SELECTOR PLAN 3
Thyroid cancer s/p thyroidectomy, now on Synthroid 150mcg    - C/w Levothyroxine 150mcg qd
Thyroid cancer s/p thyroidectomy, now on Synthroid 150mcg    - C/w Levothyroxine 150mcg qd

## 2025-01-31 NOTE — PROGRESS NOTE ADULT - SUBJECTIVE AND OBJECTIVE BOX
***************************************************************  Orestes Blackmon  (PGY1) Internal Medicine  On TEAMS  ***************************************************************    PROGRESS NOTE:     Patient is a 79y old  Male who presents with a chief complaint of bilateral PE (30 Jan 2025 18:24)        INTERVAL EVENTS:   SUBJECTIVE / OVERNIGHT EVENTS: No acute overnight events. Patient was seen and examined by the bedside this AM.   OXYGEN:   TELEMETRY:       MEDICATIONS  (STANDING):  aspirin  chewable 81 milliGRAM(s) Oral daily  atorvastatin 10 milliGRAM(s) Oral at bedtime  enoxaparin Injectable 55 milliGRAM(s) SubCutaneous once  levothyroxine 150 MICROGram(s) Oral daily  losartan 50 milliGRAM(s) Oral daily  NIFEdipine XL 90 milliGRAM(s) Oral daily    MEDICATIONS  (PRN):  ALPRAZolam 0.5 milliGRAM(s) Oral at bedtime PRN for anxiety      CAPILLARY BLOOD GLUCOSE        I&O's Summary    30 Jan 2025 07:01  -  31 Jan 2025 07:00  --------------------------------------------------------  IN: 690 mL / OUT: 0 mL / NET: 690 mL        PHYSICAL EXAM:  Vital Signs Last 24 Hrs  T(C): 36.8 (31 Jan 2025 04:12), Max: 37 (30 Jan 2025 16:58)  T(F): 98.2 (31 Jan 2025 04:12), Max: 98.6 (30 Jan 2025 16:58)  HR: 78 (31 Jan 2025 04:12) (75 - 88)  BP: 130/86 (31 Jan 2025 04:12) (106/68 - 130/86)  BP(mean): --  RR: 18 (31 Jan 2025 04:12) (17 - 18)  SpO2: 98% (31 Jan 2025 04:12) (95% - 98%)    Parameters below as of 31 Jan 2025 04:12  Patient On (Oxygen Delivery Method): nasal cannula, high flow        PHYSICAL EXAM:  GENERAL: NAD, well-groomed, well-developed  HEAD:  Atraumatic, Normocephalic  EYES: EOMI, PERRLA, conjunctiva and sclera clear  ENMT: No tonsillar erythema, exudates, or enlargement; Moist mucous membranes, Good dentition, No lesions  NECK: Supple, No JVD, Normal thyroid  NERVOUS SYSTEM:  Alert & Oriented X3, Good concentration; Motor Strength 5/5 B/L upper and lower extremities; DTRs 2+ intact and symmetric  CHEST/LUNG: Clear to auscultation bilaterally; No rales, rhonchi, wheezing, or rubs  HEART: Regular rate and rhythm; No murmurs, rubs, or gallops  ABDOMEN: Soft, Nontender, Nondistended; Bowel sounds present  EXTREMITIES:  2+ Peripheral Pulses, No clubbing, cyanosis, or edema  LYMPH: No lymphadenopathy noted  SKIN: No rashes or lesions    LABS:                        14.9   9.39  )-----------( 294      ( 29 Jan 2025 15:40 )             45.0     01-29    137  |  100  |  20  ----------------------------<  93  4.7   |  25  |  0.95    Ca    9.5      29 Jan 2025 15:40  Mg     2.2     01-29    TPro  7.2  /  Alb  3.8  /  TBili  0.4  /  DBili  x   /  AST  13  /  ALT  6[L]  /  AlkPhos  74  01-29    PT/INR - ( 29 Jan 2025 19:30 )   PT: 11.3 sec;   INR: 0.98 ratio         PTT - ( 29 Jan 2025 19:30 )  PTT:31.8 sec      Urinalysis Basic - ( 29 Jan 2025 15:40 )    Color: x / Appearance: x / SG: x / pH: x  Gluc: 93 mg/dL / Ketone: x  / Bili: x / Urobili: x   Blood: x / Protein: x / Nitrite: x   Leuk Esterase: x / RBC: x / WBC x   Sq Epi: x / Non Sq Epi: x / Bacteria: x          COORDINATION OF CARE:  Care Discussed with Consultants/Other Providers [Y/N]:  Prior or Outpatient Records Reviewed [Y/N]: ***************************************************************  Orestes Blackmon  (PGY1) Internal Medicine  On TEAMS  ***************************************************************    PROGRESS NOTE:     Patient is a 79y old  Male who presents with a chief complaint of bilateral PE (30 Jan 2025 18:24)        INTERVAL EVENTS:   SUBJECTIVE / OVERNIGHT EVENTS: No acute overnight events. Patient was seen and examined by the bedside this AM. Patient feels well, denies shortness of breath, chest pain, cough, or hemoptysis.     MEDICATIONS  (STANDING):  aspirin  chewable 81 milliGRAM(s) Oral daily  atorvastatin 10 milliGRAM(s) Oral at bedtime  enoxaparin Injectable 55 milliGRAM(s) SubCutaneous once  levothyroxine 150 MICROGram(s) Oral daily  losartan 50 milliGRAM(s) Oral daily  NIFEdipine XL 90 milliGRAM(s) Oral daily    MEDICATIONS  (PRN):  ALPRAZolam 0.5 milliGRAM(s) Oral at bedtime PRN for anxiety      CAPILLARY BLOOD GLUCOSE        I&O's Summary    30 Jan 2025 07:01  -  31 Jan 2025 07:00  --------------------------------------------------------  IN: 690 mL / OUT: 0 mL / NET: 690 mL        PHYSICAL EXAM:  Vital Signs Last 24 Hrs  T(C): 36.8 (31 Jan 2025 04:12), Max: 37 (30 Jan 2025 16:58)  T(F): 98.2 (31 Jan 2025 04:12), Max: 98.6 (30 Jan 2025 16:58)  HR: 78 (31 Jan 2025 04:12) (75 - 88)  BP: 130/86 (31 Jan 2025 04:12) (106/68 - 130/86)  BP(mean): --  RR: 18 (31 Jan 2025 04:12) (17 - 18)  SpO2: 98% (31 Jan 2025 04:12) (95% - 98%)    Parameters below as of 31 Jan 2025 04:12  Patient On (Oxygen Delivery Method): nasal cannula, high flow        PHYSICAL EXAM:  GENERAL: NAD, well-groomed, well-developed  HEAD:  Atraumatic, Normocephalic  EYES: EOMI, PERRLA, conjunctiva and sclera clear  ENMT: No tonsillar erythema, exudates, or enlargement  NECK: Supple, No JVD, Normal thyroid  NERVOUS SYSTEM:  Alert & Oriented X3, Good concentration  CHEST/LUNG: Clear to auscultation bilaterally; No rales, rhonchi, wheezing, or rubs  HEART: Regular rate and rhythm; No murmurs, rubs, or gallops  ABDOMEN: Soft, Nontender, Nondistended; Bowel sounds present  EXTREMITIES:  2+ Peripheral Pulses, No clubbing, cyanosis, or edema  LYMPH: No lymphadenopathy noted  SKIN: No rashes or lesions    LABS:                        14.9   9.39  )-----------( 294      ( 29 Jan 2025 15:40 )             45.0     01-29    137  |  100  |  20  ----------------------------<  93  4.7   |  25  |  0.95    Ca    9.5      29 Jan 2025 15:40  Mg     2.2     01-29    TPro  7.2  /  Alb  3.8  /  TBili  0.4  /  DBili  x   /  AST  13  /  ALT  6[L]  /  AlkPhos  74  01-29    PT/INR - ( 29 Jan 2025 19:30 )   PT: 11.3 sec;   INR: 0.98 ratio         PTT - ( 29 Jan 2025 19:30 )  PTT:31.8 sec      Urinalysis Basic - ( 29 Jan 2025 15:40 )    Color: x / Appearance: x / SG: x / pH: x  Gluc: 93 mg/dL / Ketone: x  / Bili: x / Urobili: x   Blood: x / Protein: x / Nitrite: x   Leuk Esterase: x / RBC: x / WBC x   Sq Epi: x / Non Sq Epi: x / Bacteria: x          COORDINATION OF CARE:  Care Discussed with Consultants/Other Providers [Y/N]:  Prior or Outpatient Records Reviewed [Y/N]:

## 2025-01-31 NOTE — PROGRESS NOTE ADULT - PROBLEM SELECTOR PLAN 1
Per chart review hx of provoked RLE DVT 2013 (tx with 3 months AC, possibly Warfarin). 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 New onset ELLIS x1 week, CTA w/ bilateral PE with 5-lobe involvement, RV/LV hypertrophy; unable to r/o right heart strain. No obvious provoking factors; hx of prostate/thyroid ca. On admission hemodynamically stable, no supplemental oxygen requirements    - S/p 110mg Lovenox in ED, will c/w Lovenox 110mg BID for now  - TTE ordered to eval for RHS  - Telemetry monitoring,   - On ASA as outpatient, unclear indication; can continue Per chart review hx of provoked RLE DVT 2013 (tx with 3 months AC, possibly Warfarin). 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 New onset ELLIS x1 week, CTA w/ bilateral PE with 5-lobe involvement, RV/LV hypertrophy; unable to r/o right heart strain. No obvious provoking factors; hx of prostate/thyroid ca. On admission hemodynamically stable, no supplemental oxygen requirements    - Transition to Eliquis on dc   - No RHS on TTE   - Telemetry monitoring,   - On ASA as outpatient, unclear indication; can continue

## 2025-01-31 NOTE — PROGRESS NOTE ADULT - ASSESSMENT
79M with PMH HTN, HLD, BAUTISTA on CPAP, thyroid cancer s/p thyroidectomy, prostate cancer s/p brachytherapy, DVT 2013 (following traumatic fall, no current AC), melanoma, varicose veins s/p phlebectomy (1/21/25), recent outpatient dx RLE DVT p/w new ELLIS x1 week, admitted for bilateral PE, TTE pending to eval for right heart strain
79M with PMH HTN, HLD, BAUTISTA on CPAP, thyroid cancer s/p thyroidectomy, prostate cancer s/p brachytherapy, DVT 2013 (following traumatic fall, no current AC), melanoma, varicose veins s/p phlebectomy (1/21/25), recent outpatient dx RLE DVT p/w new ELLIS x1 week, admitted for bilateral PE, TTE pending to eval for right heart strain

## 2025-01-31 NOTE — DISCHARGE NOTE NURSING/CASE MANAGEMENT/SOCIAL WORK - FINANCIAL ASSISTANCE
[facility] provides services at a reduced cost to those who are determined to be eligible through [facility]’s financial assistance program. Information regarding [facility]’s financial assistance program can be found by going to [email or web address] or by calling [phone].

## 2025-01-31 NOTE — PROGRESS NOTE ADULT - PROBLEM SELECTOR PLAN 2
Home medications: Nifedipine ER 90mg qd, Losartan 50mg qd    - Hold for now, normotensive on admission  - Resume as tolerated Home medications: Nifedipine ER 90mg qd, Losartan 50mg qd    - C/w home meds

## 2025-01-31 NOTE — DISCHARGE NOTE NURSING/CASE MANAGEMENT/SOCIAL WORK - PATIENT PORTAL LINK FT
You can access the FollowMyHealth Patient Portal offered by [facility] by registering at the following website: [email or web address]. By joining [employer]’s FollowMyHealth portal, you will also be able to view your health information using other applications (apps) compatible with our system.

## 2025-01-31 NOTE — PROGRESS NOTE ADULT - PROBLEM SELECTOR PLAN 6
DVT - Lovenox 1mg/kg BID  Diet - DASH/TLC  Dispo - active  Code Status - Full Code Diet - DASH/TLC  Dispo - Home, no needs   Code Status - Full Code

## 2025-01-31 NOTE — DISCHARGE NOTE NURSING/CASE MANAGEMENT/SOCIAL WORK - NSDCPEFALRISK_GEN_ALL_CORE
For information on Fall & Injury Prevention, visit: [email or web address] OR  [email or web address] OR  https://www.cdc.gov/steadi/patient.html

## 2025-02-03 RX ORDER — APIXABAN 5 MG/1
5 TABLET, FILM COATED ORAL
Qty: 60 | Refills: 5 | Status: ACTIVE | COMMUNITY
Start: 2025-02-03 | End: 1900-01-01

## 2025-02-04 ENCOUNTER — APPOINTMENT (OUTPATIENT)
Dept: VASCULAR SURGERY | Facility: CLINIC | Age: 80
End: 2025-02-04

## 2025-02-12 ENCOUNTER — APPOINTMENT (OUTPATIENT)
Dept: PULMONOLOGY | Facility: CLINIC | Age: 80
End: 2025-02-12
Payer: MEDICARE

## 2025-02-12 DIAGNOSIS — G47.33 OBSTRUCTIVE SLEEP APNEA (ADULT) (PEDIATRIC): ICD-10-CM

## 2025-02-12 PROCEDURE — 99214 OFFICE O/P EST MOD 30 MIN: CPT | Mod: 2W

## 2025-02-14 ENCOUNTER — APPOINTMENT (OUTPATIENT)
Dept: VASCULAR SURGERY | Facility: CLINIC | Age: 80
End: 2025-02-14

## 2025-02-17 ENCOUNTER — APPOINTMENT (OUTPATIENT)
Dept: SURGICAL ONCOLOGY | Facility: CLINIC | Age: 80
End: 2025-02-17
Payer: MEDICARE

## 2025-02-17 VITALS
HEART RATE: 83 BPM | SYSTOLIC BLOOD PRESSURE: 120 MMHG | HEIGHT: 72 IN | DIASTOLIC BLOOD PRESSURE: 70 MMHG | BODY MASS INDEX: 32.51 KG/M2 | WEIGHT: 240 LBS | OXYGEN SATURATION: 96 %

## 2025-02-17 DIAGNOSIS — D03.9 MELANOMA IN SITU, UNSPECIFIED: ICD-10-CM

## 2025-02-17 PROCEDURE — 99214 OFFICE O/P EST MOD 30 MIN: CPT

## 2025-05-05 ENCOUNTER — APPOINTMENT (OUTPATIENT)
Dept: VASCULAR SURGERY | Facility: CLINIC | Age: 80
End: 2025-05-05
Payer: MEDICARE

## 2025-05-05 VITALS — HEART RATE: 84 BPM | SYSTOLIC BLOOD PRESSURE: 146 MMHG | DIASTOLIC BLOOD PRESSURE: 84 MMHG

## 2025-05-05 VITALS
BODY MASS INDEX: 33.18 KG/M2 | DIASTOLIC BLOOD PRESSURE: 90 MMHG | TEMPERATURE: 98.1 F | HEIGHT: 72 IN | SYSTOLIC BLOOD PRESSURE: 149 MMHG | HEART RATE: 86 BPM | WEIGHT: 245 LBS

## 2025-05-05 PROCEDURE — 93970 EXTREMITY STUDY: CPT

## 2025-05-05 PROCEDURE — 99213 OFFICE O/P EST LOW 20 MIN: CPT

## 2025-06-05 ENCOUNTER — TRANSCRIPTION ENCOUNTER (OUTPATIENT)
Age: 80
End: 2025-06-05

## 2025-06-27 ENCOUNTER — APPOINTMENT (OUTPATIENT)
Dept: PODIATRY | Facility: CLINIC | Age: 80
End: 2025-06-27

## 2025-06-27 PROCEDURE — 11720 DEBRIDE NAIL 1-5: CPT

## 2025-08-19 RX ORDER — CICLOPIROX 71.3 MG/ML
8 SOLUTION TOPICAL
Qty: 6.6 | Refills: 3 | Status: ACTIVE | COMMUNITY
Start: 2025-08-19 | End: 1900-01-01

## (undated) DEVICE — ST EXT IV SMARTSITE 2VLV SP M LL 5ML IV1

## (undated) DEVICE — LEX ELECTRO PHYSIOLOGY: Brand: MEDLINE INDUSTRIES, INC.

## (undated) DEVICE — LIMB HOLDER, WRIST/ANKLE: Brand: DEROYAL

## (undated) DEVICE — INTRO SHEATH ART/FEM ENGAGE .038 5F12CM

## (undated) DEVICE — SET PRIMARY GRVTY 10DP MALE LL 104IN

## (undated) DEVICE — CATH EP SUPREME QUADPOLAR CRD2 5F 5MM 120CM

## (undated) DEVICE — SKIN PREP TRAY W/CHG: Brand: MEDLINE INDUSTRIES, INC.

## (undated) DEVICE — DECANTER: Brand: UNBRANDED

## (undated) DEVICE — DECANT BG O JET

## (undated) DEVICE — CATH EP SUPRM QUADPOLAR JSN 5F 5MM 120CM

## (undated) DEVICE — INTRO SHEATH FASTCATH SRO .038IN 8.5F 63CM

## (undated) DEVICE — ADULT, W/LG. BACK PAD, RADIOTRANSPARENT ELEMENT AND LEAD WIRE: Brand: DEFIBRILLATION ELECTRODES

## (undated) DEVICE — SHEATH INTRO MICRA HC 23F 55.7CM

## (undated) DEVICE — ST INF PRI SMRTSTE 20DRP 2VLV 24ML 117